# Patient Record
Sex: FEMALE | Race: WHITE | NOT HISPANIC OR LATINO | URBAN - METROPOLITAN AREA
[De-identification: names, ages, dates, MRNs, and addresses within clinical notes are randomized per-mention and may not be internally consistent; named-entity substitution may affect disease eponyms.]

---

## 2017-04-05 ENCOUNTER — INPATIENT HOSPITAL (OUTPATIENT)
Dept: URBAN - METROPOLITAN AREA HOSPITAL 27 | Facility: HOSPITAL | Age: 82
End: 2017-04-05
Payer: MEDICARE

## 2017-04-05 ENCOUNTER — HOSPITAL ENCOUNTER (OUTPATIENT)
Facility: HOSPITAL | Age: 82
Setting detail: OBSERVATION
Discharge: HOME | End: 2017-04-07
Attending: INTERNAL MEDICINE | Admitting: INTERNAL MEDICINE

## 2017-04-05 DIAGNOSIS — K92.2 GASTROINTESTINAL HEMORRHAGE, UNSPECIFIED: ICD-10-CM

## 2017-04-05 LAB
HCT VFR BLD AUTO: 35 % (ref 37–47)
HGB BLD-MCNC: 11.4 G/DL (ref 12–16)
TSH SERPL DL<=0.05 MIU/L-ACNC: 0.75 MIU/ML (ref 0.27–4.2)

## 2017-04-05 PROCEDURE — G0378 HOSPITAL OBSERVATION PER HR: HCPCS

## 2017-04-05 PROCEDURE — G0379 DIRECT REFER HOSPITAL OBSERV: HCPCS

## 2017-04-05 PROCEDURE — 84443 ASSAY THYROID STIM HORMONE: CPT | Performed by: HOSPITALIST

## 2017-04-05 PROCEDURE — 99223 1ST HOSP IP/OBS HIGH 75: CPT | Performed by: INTERNAL MEDICINE

## 2017-04-05 PROCEDURE — 85018 HEMOGLOBIN: CPT | Performed by: HOSPITALIST

## 2017-04-05 PROCEDURE — 85014 HEMATOCRIT: CPT | Performed by: HOSPITALIST

## 2017-04-05 PROCEDURE — 99222 1ST HOSP IP/OBS MODERATE 55: CPT | Performed by: INTERNAL MEDICINE

## 2017-04-05 PROCEDURE — 99222 1ST HOSP IP/OBS MODERATE 55: CPT | Performed by: HOSPITALIST

## 2017-04-05 RX ORDER — PANTOPRAZOLE SODIUM 40 MG/1
40 TABLET, DELAYED RELEASE ORAL DAILY
COMMUNITY
End: 2020-01-21 | Stop reason: HOSPADM

## 2017-04-05 RX ORDER — BENZONATATE 100 MG/1
100 CAPSULE ORAL 3 TIMES DAILY PRN
COMMUNITY
End: 2020-04-30

## 2017-04-05 RX ORDER — POTASSIUM CHLORIDE 750 MG/1
10 TABLET, FILM COATED, EXTENDED RELEASE ORAL DAILY
Status: DISCONTINUED | OUTPATIENT
Start: 2017-04-06 | End: 2017-04-07 | Stop reason: HOSPADM

## 2017-04-05 RX ORDER — LEVOTHYROXINE SODIUM 0.05 MG/1
50 TABLET ORAL DAILY
COMMUNITY

## 2017-04-05 RX ORDER — L.ACID,PARA/B.BIFIDUM/S.THERM 8B CELL
1 CAPSULE ORAL DAILY
Status: DISCONTINUED | OUTPATIENT
Start: 2017-04-05 | End: 2017-04-07 | Stop reason: HOSPADM

## 2017-04-05 RX ORDER — FLUTICASONE PROPIONATE 50 MCG
2 SPRAY, SUSPENSION (ML) NASAL DAILY
Status: DISCONTINUED | OUTPATIENT
Start: 2017-04-06 | End: 2017-04-07 | Stop reason: HOSPADM

## 2017-04-05 RX ORDER — CLOTRIMAZOLE AND BETAMETHASONE DIPROPIONATE 10; .64 MG/G; MG/G
1 CREAM TOPICAL 2 TIMES DAILY PRN
COMMUNITY

## 2017-04-05 RX ORDER — LORAZEPAM 1 MG/1
1 TABLET ORAL 2 TIMES DAILY
Status: DISCONTINUED | OUTPATIENT
Start: 2017-04-06 | End: 2017-04-06

## 2017-04-05 RX ORDER — DOCUSATE SODIUM 100 MG/1
100 CAPSULE, LIQUID FILLED ORAL 2 TIMES DAILY PRN
COMMUNITY
End: 2017-04-07 | Stop reason: HOSPADM

## 2017-04-05 RX ORDER — LEVOTHYROXINE SODIUM 0.05 MG/1
50 TABLET ORAL
Status: DISCONTINUED | OUTPATIENT
Start: 2017-04-06 | End: 2017-04-07 | Stop reason: HOSPADM

## 2017-04-05 RX ORDER — ASPIRIN 81 MG/1
81 TABLET ORAL DAILY
COMMUNITY
End: 2017-04-07 | Stop reason: HOSPADM

## 2017-04-05 RX ORDER — DICYCLOMINE HYDROCHLORIDE 10 MG/1
10 CAPSULE ORAL
COMMUNITY

## 2017-04-05 RX ORDER — LEVOCETIRIZINE DIHYDROCHLORIDE 5 MG/1
5 TABLET, FILM COATED ORAL EVERY EVENING
COMMUNITY

## 2017-04-05 RX ORDER — CLOTRIMAZOLE AND BETAMETHASONE DIPROPIONATE 10; .64 MG/G; MG/G
1 CREAM TOPICAL 2 TIMES DAILY PRN
Status: DISCONTINUED | OUTPATIENT
Start: 2017-04-05 | End: 2017-04-07 | Stop reason: HOSPADM

## 2017-04-05 RX ORDER — CETIRIZINE HYDROCHLORIDE 10 MG/1
5 TABLET ORAL NIGHTLY
Status: DISCONTINUED | OUTPATIENT
Start: 2017-04-05 | End: 2017-04-07 | Stop reason: HOSPADM

## 2017-04-05 RX ORDER — LORAZEPAM 1 MG/1
1 TABLET ORAL 3 TIMES DAILY
Status: ON HOLD | COMMUNITY
End: 2020-03-07 | Stop reason: SDUPTHER

## 2017-04-05 RX ORDER — BENZONATATE 100 MG/1
100 CAPSULE ORAL 3 TIMES DAILY PRN
Status: DISCONTINUED | OUTPATIENT
Start: 2017-04-05 | End: 2017-04-07 | Stop reason: HOSPADM

## 2017-04-05 RX ORDER — GUAIFENESIN 600 MG/1
600 TABLET, EXTENDED RELEASE ORAL EVERY 12 HOURS PRN
Status: DISCONTINUED | OUTPATIENT
Start: 2017-04-05 | End: 2017-04-07 | Stop reason: HOSPADM

## 2017-04-05 RX ORDER — PANTOPRAZOLE SODIUM 40 MG/1
40 TABLET, DELAYED RELEASE ORAL DAILY
Status: DISCONTINUED | OUTPATIENT
Start: 2017-04-06 | End: 2017-04-07 | Stop reason: HOSPADM

## 2017-04-05 RX ORDER — MULTIPLE VITAMINS W/ MINERALS TAB 9MG-400MCG
1 TAB ORAL DAILY
Status: DISCONTINUED | OUTPATIENT
Start: 2017-04-06 | End: 2017-04-07 | Stop reason: HOSPADM

## 2017-04-05 RX ORDER — MECLIZINE HYDROCHLORIDE 25 MG/1
12.5 TABLET ORAL 3 TIMES DAILY PRN
Status: DISCONTINUED | OUTPATIENT
Start: 2017-04-05 | End: 2017-04-07 | Stop reason: HOSPADM

## 2017-04-05 RX ORDER — SULFAMETHOXAZOLE AND TRIMETHOPRIM 200; 40 MG/5ML; MG/5ML
80 SUSPENSION ORAL EVERY 12 HOURS SCHEDULED
Status: DISPENSED | OUTPATIENT
Start: 2017-04-05 | End: 2017-04-07

## 2017-04-05 RX ORDER — FLUTICASONE PROPIONATE 50 MCG
2 SPRAY, SUSPENSION (ML) NASAL DAILY
COMMUNITY

## 2017-04-05 RX ORDER — DICYCLOMINE HYDROCHLORIDE 10 MG/1
10 CAPSULE ORAL
Status: DISCONTINUED | OUTPATIENT
Start: 2017-04-05 | End: 2017-04-07 | Stop reason: HOSPADM

## 2017-04-05 RX ORDER — SULFAMETHOXAZOLE AND TRIMETHOPRIM 400; 80 MG/1; MG/1
1 TABLET ORAL 2 TIMES DAILY
COMMUNITY
End: 2017-04-07 | Stop reason: HOSPADM

## 2017-04-05 RX ORDER — GUAIFENESIN 600 MG/1
600 TABLET, EXTENDED RELEASE ORAL EVERY 12 HOURS PRN
COMMUNITY

## 2017-04-05 RX ORDER — SODIUM CHLORIDE 0.9 % (FLUSH) 0.9 %
1-10 SYRINGE (ML) INJECTION AS NEEDED
Status: DISCONTINUED | OUTPATIENT
Start: 2017-04-05 | End: 2017-04-07 | Stop reason: HOSPADM

## 2017-04-05 RX ORDER — MECLIZINE HCL 12.5 MG/1
12.5 TABLET ORAL 3 TIMES DAILY PRN
COMMUNITY

## 2017-04-05 RX ORDER — DOCUSATE SODIUM 100 MG/1
100 CAPSULE, LIQUID FILLED ORAL 2 TIMES DAILY PRN
Status: DISCONTINUED | OUTPATIENT
Start: 2017-04-05 | End: 2017-04-07 | Stop reason: HOSPADM

## 2017-04-05 RX ORDER — LORAZEPAM 1 MG/1
TABLET ORAL
Status: COMPLETED
Start: 2017-04-05 | End: 2017-04-05

## 2017-04-05 RX ORDER — ACETAMINOPHEN 325 MG/1
650 TABLET ORAL EVERY 4 HOURS PRN
Status: DISCONTINUED | OUTPATIENT
Start: 2017-04-05 | End: 2017-04-07 | Stop reason: HOSPADM

## 2017-04-05 RX ADMIN — ACETAMINOPHEN 650 MG: 325 TABLET, FILM COATED ORAL at 23:38

## 2017-04-05 RX ADMIN — LORAZEPAM 1 MG: 1 TABLET ORAL at 23:37

## 2017-04-05 RX ADMIN — CETIRIZINE HYDROCHLORIDE 5 MG: 10 TABLET, FILM COATED ORAL at 23:37

## 2017-04-06 PROBLEM — K92.2 LOWER GI BLEED: Status: ACTIVE | Noted: 2017-04-06

## 2017-04-06 LAB
ANION GAP SERPL CALCULATED.3IONS-SCNC: 13.2 MMOL/L
BASOPHILS # BLD AUTO: 0.01 10*3/MM3 (ref 0–0.2)
BASOPHILS NFR BLD AUTO: 0.2 % (ref 0–2)
BUN BLD-MCNC: 16 MG/DL (ref 8–23)
BUN/CREAT SERPL: 12.4 (ref 7–25)
C DIFF TOX GENS STL QL NAA+PROBE: POSITIVE
CALCIUM SPEC-SCNC: 8.5 MG/DL (ref 8.8–10.5)
CHLORIDE SERPL-SCNC: 103 MMOL/L (ref 98–107)
CO2 SERPL-SCNC: 22.8 MMOL/L (ref 22–29)
CREAT BLD-MCNC: 1.29 MG/DL (ref 0.57–1)
DEPRECATED RDW RBC AUTO: 44 FL (ref 37–54)
EOSINOPHIL # BLD AUTO: 0.08 10*3/MM3 (ref 0.1–0.3)
EOSINOPHIL NFR BLD AUTO: 1.2 % (ref 0–4)
ERYTHROCYTE [DISTWIDTH] IN BLOOD BY AUTOMATED COUNT: 12.8 % (ref 11.5–14.5)
GFR SERPL CREATININE-BSD FRML MDRD: 39 ML/MIN/1.73
GLUCOSE BLD-MCNC: 85 MG/DL (ref 65–99)
HCT VFR BLD AUTO: 33.9 % (ref 37–47)
HGB BLD-MCNC: 11 G/DL (ref 12–16)
IMM GRANULOCYTES # BLD: 0.01 10*3/MM3 (ref 0–0.03)
IMM GRANULOCYTES NFR BLD: 0.2 % (ref 0–0.5)
LYMPHOCYTES # BLD AUTO: 3.19 10*3/MM3 (ref 0.6–4.8)
LYMPHOCYTES NFR BLD AUTO: 48.3 % (ref 20–45)
MCH RBC QN AUTO: 30.4 PG (ref 27–31)
MCHC RBC AUTO-ENTMCNC: 32.4 G/DL (ref 31–37)
MCV RBC AUTO: 93.6 FL (ref 81–99)
MONOCYTES # BLD AUTO: 0.49 10*3/MM3 (ref 0–1)
MONOCYTES NFR BLD AUTO: 7.4 % (ref 3–8)
NEUTROPHILS # BLD AUTO: 2.82 10*3/MM3 (ref 1.5–8.3)
NEUTROPHILS NFR BLD AUTO: 42.7 % (ref 45–70)
NRBC BLD MANUAL-RTO: 0 /100 WBC (ref 0–0)
PLATELET # BLD AUTO: 214 10*3/MM3 (ref 140–500)
PMV BLD AUTO: 10.9 FL (ref 7.4–10.4)
POTASSIUM BLD-SCNC: 4.4 MMOL/L (ref 3.5–5.2)
RBC # BLD AUTO: 3.62 10*6/MM3 (ref 4.2–5.4)
SODIUM BLD-SCNC: 139 MMOL/L (ref 136–145)
WBC NRBC COR # BLD: 6.6 10*3/MM3 (ref 4.8–10.8)

## 2017-04-06 PROCEDURE — 80048 BASIC METABOLIC PNL TOTAL CA: CPT | Performed by: HOSPITALIST

## 2017-04-06 PROCEDURE — 85025 COMPLETE CBC W/AUTO DIFF WBC: CPT | Performed by: HOSPITALIST

## 2017-04-06 PROCEDURE — G0378 HOSPITAL OBSERVATION PER HR: HCPCS

## 2017-04-06 PROCEDURE — 99225 PR SBSQ OBSERVATION CARE/DAY 25 MINUTES: CPT | Performed by: NURSE PRACTITIONER

## 2017-04-06 PROCEDURE — 96365 THER/PROPH/DIAG IV INF INIT: CPT

## 2017-04-06 PROCEDURE — 99231 SBSQ HOSP IP/OBS SF/LOW 25: CPT | Performed by: INTERNAL MEDICINE

## 2017-04-06 PROCEDURE — 87493 C DIFF AMPLIFIED PROBE: CPT | Performed by: HOSPITALIST

## 2017-04-06 PROCEDURE — 96366 THER/PROPH/DIAG IV INF ADDON: CPT

## 2017-04-06 RX ORDER — LORAZEPAM 1 MG/1
1 TABLET ORAL EVERY 12 HOURS SCHEDULED
Status: DISCONTINUED | OUTPATIENT
Start: 2017-04-06 | End: 2017-04-07 | Stop reason: HOSPADM

## 2017-04-06 RX ADMIN — POTASSIUM CHLORIDE 10 MEQ: 750 TABLET, FILM COATED, EXTENDED RELEASE ORAL at 10:20

## 2017-04-06 RX ADMIN — FLUTICASONE PROPIONATE 2 SPRAY: 50 SPRAY, METERED NASAL at 10:21

## 2017-04-06 RX ADMIN — DILTIAZEM HYDROCHLORIDE 30 MG: 30 TABLET, FILM COATED ORAL at 10:20

## 2017-04-06 RX ADMIN — SULFAMETHOXAZOLE AND TRIMETHOPRIM 80 MG: 200; 40 SUSPENSION ORAL at 21:08

## 2017-04-06 RX ADMIN — METRONIDAZOLE 500 MG: 500 INJECTION, SOLUTION INTRAVENOUS at 15:37

## 2017-04-06 RX ADMIN — SULFAMETHOXAZOLE AND TRIMETHOPRIM 80 MG: 200; 40 SUSPENSION ORAL at 10:20

## 2017-04-06 RX ADMIN — CALCIUM CARBONATE 500 MG (1,250 MG)-VITAMIN D3 200 UNIT TABLET 1 TABLET: at 17:42

## 2017-04-06 RX ADMIN — Medication 1 CAPSULE: at 10:22

## 2017-04-06 RX ADMIN — ACETAMINOPHEN 650 MG: 325 TABLET, FILM COATED ORAL at 21:08

## 2017-04-06 RX ADMIN — CALCIUM CARBONATE 500 MG (1,250 MG)-VITAMIN D3 200 UNIT TABLET 1 TABLET: at 10:31

## 2017-04-06 RX ADMIN — LEVOTHYROXINE SODIUM 50 MCG: 50 TABLET ORAL at 06:42

## 2017-04-06 RX ADMIN — DILTIAZEM HYDROCHLORIDE 30 MG: 30 TABLET, FILM COATED ORAL at 17:42

## 2017-04-06 RX ADMIN — Medication 1 TABLET: at 10:20

## 2017-04-06 RX ADMIN — LORAZEPAM 1 MG: 1 TABLET ORAL at 21:08

## 2017-04-06 RX ADMIN — PANTOPRAZOLE SODIUM 40 MG: 40 TABLET, DELAYED RELEASE ORAL at 10:20

## 2017-04-06 RX ADMIN — METRONIDAZOLE 500 MG: 500 INJECTION, SOLUTION INTRAVENOUS at 21:12

## 2017-04-06 RX ADMIN — CETIRIZINE HYDROCHLORIDE 5 MG: 10 TABLET, FILM COATED ORAL at 21:09

## 2017-04-06 RX ADMIN — LORAZEPAM 1 MG: 1 TABLET ORAL at 10:30

## 2017-04-06 NOTE — PLAN OF CARE
Problem: Patient Care Overview (Adult)  Goal: Plan of Care Review  Outcome: Ongoing (interventions implemented as appropriate)    04/06/17 0358   Coping/Psychosocial Response Interventions   Plan Of Care Reviewed With patient   Patient Care Overview   Progress progress towards functional goals is fair   Outcome Evaluation   Outcome Summary/Follow up Plan New admit, rectal bleeding, positive occult stool, GI consulted, VSS, clear liquid diet, contact spore for cdiff order, labs in the AM       Goal: Adult Individualization and Mutuality  Outcome: Ongoing (interventions implemented as appropriate)  Goal: Discharge Needs Assessment  Outcome: Ongoing (interventions implemented as appropriate)    Problem: Fall Risk (Adult)  Goal: Identify Related Risk Factors and Signs and Symptoms  Outcome: Ongoing (interventions implemented as appropriate)  Goal: Absence of Falls  Outcome: Ongoing (interventions implemented as appropriate)    Problem: Skin Integrity Impairment, Risk/Actual (Adult)  Goal: Identify Related Risk Factors and Signs and Symptoms  Outcome: Ongoing (interventions implemented as appropriate)  Goal: Skin Integrity/Wound Healing  Outcome: Ongoing (interventions implemented as appropriate)    Problem: Pain, Acute (Adult)  Goal: Identify Related Risk Factors and Signs and Symptoms  Outcome: Ongoing (interventions implemented as appropriate)  Goal: Acceptable Pain Control/Comfort Level  Outcome: Ongoing (interventions implemented as appropriate)

## 2017-04-06 NOTE — H&P
[facility] HOSPITALIST     Laci Freedman MD    CHIEF COMPLAINT: Rectal bleeding    HISTORY OF PRESENT ILLNESS:    87 y/o female with hypertension, dyslipidemia, hypothyroidism, GERD, anxiety and depression and h/o hemorrhoids with bleeding and diverticulosis was transferred here from [facility] for GI evaluation of rectal bleeding (they do not have GI coverage this week). The patient notes this bleeding started 3 days ago.  She notes passing bright red blood mostly in the morning the last 3 days. Then loose stools and cramping with her bowel movements throughout the remainder of the day. She denies any N/V and notes her appetite has been normal.  She was started on Bactrim for a sinus and ear infection 3/28/17 and notes she still has some post-nasal gtt and sinus pressure but it has improved with the Abx. The patient denies any fever, cough, SOA.  She notes intermittent dizziness since her sinus problems began which has also improved.  She has had bleeding from her hemorroids in the past but notes this is much worse.  She denies any CP or heart palpitations. She reports a colonoscopy done by Dr. Conley last year that showed diverticulosis and I have reviewed records of a colonoscopy done in 2014 that also showed diverticular disease and hemorrhoids. Patient denies any weakness.       Past Medical History:   Diagnosis Date   • Anxiety    • Hypertension    • Hypothyroid    • Sinus congestion      Past Surgical History:   Procedure Laterality Date   • ABDOMINAL SURGERY     • HYSTERECTOMY     • THYROIDECTOMY       Family History   Problem Relation Age of Onset   • Heart disease Father    • Cancer Sister    • Cancer Brother      Social History   Substance Use Topics   • Smoking status: Never Smoker   • Smokeless tobacco: None   • Alcohol use No     Prescriptions Prior to Admission   Medication Sig Dispense Refill Last Dose   • aspirin 81 MG EC tablet Take 81 mg by mouth Daily.   4/5/2017 at  0900   • benzonatate (TESSALON) 100 MG capsule Take 100 mg by mouth 3 (Three) Times a Day As Needed for Cough.      • Calcium Carbonate-Vitamin D (CALCIUM 500 + D PO) Take 1 tablet by mouth 2 (Two) Times a Day.   4/5/2017 at 0900   • clotrimazole-betamethasone (LOTRISONE) 1-0.05 % cream Apply 1 application topically 2 (Two) Times a Day As Needed (HEMMROIDS).   4/4/2017 at 0900   • dicyclomine (BENTYL) 10 MG capsule Take 10 mg by mouth 4 (Four) Times a Day Before Meals & at Bedtime As Needed (ABDOMINAL PAIN).   4/5/2017 at 0900   • diltiaZEM (CARDIZEM) 30 MG tablet Take 30 mg by mouth 2 (Two) Times a Day.   4/5/2017 at 0900   • docusate sodium (COLACE) 100 MG capsule Take 100 mg by mouth 2 (Two) Times a Day As Needed for Constipation.   4/1/2017 at 0900   • fluticasone (FLONASE) 50 MCG/ACT nasal spray 2 sprays into each nostril Daily.   4/4/2017 at 2100   • guaiFENesin (MUCINEX) 600 MG 12 hr tablet Take 600 mg by mouth Every 12 (Twelve) Hours As Needed for Cough.      • levocetirizine (XYZAL) 5 MG tablet Take 5 mg by mouth Every Evening.   4/4/2017 at 2100   • levothyroxine (SYNTHROID) 50 MCG tablet Take 50 mcg by mouth Daily.   4/5/2017 at 0900   • LORazepam (ATIVAN) 1 MG tablet Take 1 mg by mouth 2 (Two) Times a Day.   4/5/2017 at 0900   • meclizine (ANTIVERT) 12.5 MG tablet Take 12.5 mg by mouth 3 (Three) Times a Day As Needed for dizziness.      • Multiple Vitamins-Minerals (CENTRUM SILVER PO) Take 1 tablet by mouth Daily.   4/4/2017 at 2100   • pantoprazole (PROTONIX) 40 MG EC tablet Take 40 mg by mouth Daily.   4/5/2017 at 0900   • Potassium Chloride (KLOR-CON 10 PO) Take 10 mEq by mouth Daily.   4/5/2017 at 0900   • sulfamethoxazole-trimethoprim (BACTRIM,SEPTRA) 400-80 MG tablet Take 1 tablet by mouth 2 (Two) Times a Day.   4/5/2017 at 0900     Allergies:  Biaxin [clarithromycin]; Ceftin [cefuroxime]; Celexa [citalopram]; Chlorpheniramine; Ciprofloxacin; Crestor [rosuvastatin]; Doxycycline; Effexor  [venlafaxine]; Fluoxetine; Hydrocodone bitartrate er; Imipramine hcl; Levaquin [levofloxacin]; Lexapro [escitalopram oxalate]; Macrobid [nitrofurantoin monohyd macro]; Methscopolamine; Phenylpropanolamine; Prednisone; Promethazine; Simvastatin; Verapamil; Welchol [colesevelam hcl]; Zetia [ezetimibe]; Zoloft [sertraline hcl]; and Penicillins    REVIEW OF SYSTEMS:  Please see the above history of present illness for pertinent positives and negatives.  The remainder of the patient's systems have been reviewed and are negative.     Vital Signs  Temp:  [98.7 °F (37.1 °C)] 98.7 °F (37.1 °C)  Heart Rate:  [78] 78  Resp:  [18] 18  BP: (175)/(85) 175/85  Oxygen Therapy  SpO2: 98 %  There is no height or weight on file to calculate BMI.         Physical Exam:  Physical Exam   Constitutional: Patient appears well-developed and well-nourished and in no acute distress   HEENT:   Head: Normocephalic and atraumatic.   Eyes:  Pupils are equal, round, and reactive to light. EOM are intact. Sclera are anicteric and non-injected.  Mouth and Throat: Patient has moist mucous membranes. Oropharynx is clear of any erythema or exudate.     Neck: Neck supple. No JVD present. No thyromegaly present. No lymphadenopathy present.  Cardiovascular: Regular rate, regular rhythm, S1 normal and S2 normal.  Exam reveals no gallop and no friction rub.  No murmur heard.  Pulmonary/Chest: Lungs are clear to auscultation bilaterally. No respiratory distress. No wheezes. No rhonchi. No rales.   Abdominal: Soft. Bowel sounds are normal. No distension and no mass. There is no hepatosplenomegaly. Patient notes mild diffuse tenderness on exam.   Musculoskeletal: Normal Muscle tone  Extremities: No edema. Pulses are palpable in all 4 extremities.  Neurological: Patient is alert and oriented to person, place, and time. Cranial nerves II-XII are grossly intact with no focal deficits.  Skin: Skin is warm. Nails show no clubbing.  No cyanosis or  erythema.     Results Review:    I reviewed the patient's new clinical results.  Lab Results (most recent)     Labs from [facility] reviewed.          Imaging Results (most recent)    C CT abdomen and pelvis prelim results from [facility] reviewed.            ECG/EMG Results (most recent)     None            Assessment/Plan     1. Rectal bleeding/Lower GI bleed: I have consulted GI and Dr. Ceja has seen the patient.  Suspected diverticular bleeding source.  I wonder if this may have started with diarrhea that could be from the patient's antibiotics.  Will start a probiotic.  Keep patient on clear liquids for now, suspect this will be self limited. Monitor. CT scan at outside hospital showed diverticulosis and nothing acute. Will check c-diff although with NL WNL, NL CT abdomen and afebrile I do believe there is any infectious component to the patient's symptoms.    2. Acute blood loss anemia: Patient's Hb in 10/2016 was 13.8 per [facility] records, now 11.8.  Patient received 1L IVF at [facility] ER so I would expect this to dilute some on next check.  Monitor Hb.     3. Recent sinus and ear infection: Patient on Bactrim due to multiple drug allergies.  Creatinine is up slightly and may be secondary to this, patient notes symptoms improving. Will continue to complete course but monitor renal function closely. Will have pharmacy check dosing for renal function.    4. Mild EDWIN on CKD stage III: Possibly secondary to Bactrim or patient's blood loss.  Just slightly above baseline. Creatinine was 0.98 with GFR in 50s in 10/2016.  Now 1.33 and GFR in 30s.  Patient received 1L IVFs at outside ER. She is eating and drinking normally per her report. No further IVFs tonight as this will further dilute Hb. Monitor renal function closely.     5. Hypertension: BP elevated but patient has not had all her medications today. Resume home diltiazem. Monitor.    5. Dyslipidemia: On no chronic medications for this, recommend  F/U with PCP at discharge.    6. Hypothyroidism: continue home supplementation.    7. GERD: Continue protonix, no acute issues.    8. Anxiety and depression: Continue home dose ativan. No acute issues here.    9. DVT prophylaxis: SCDs, no AC with patient bleeding.      I discussed the patients findings and my recommendations with patient.     Jackelyn Valentin MD  04/05/17  9:50 PM

## 2017-04-06 NOTE — PROGRESS NOTES
"SERVICE: [facility] HOSPITALIST    CONSULTANTS: GI    CHIEF COMPLAINT: f/u LGIB/BRBPR    SUBJECTIVE: The patient reports feeling well, no abdominal pain and only having small amount of blood on toilet tissue with wiping, none in commode/stool. She reports not much appetite but no nausea/vomiting.   Denies f/c/cough/soa/chest pain/abdominal pain or other new concerns.    OBJECTIVE:    /75 (BP Location: Right arm, Patient Position: Sitting)  Pulse 78  Temp 97.9 °F (36.6 °C) (Oral)   Resp 18  Ht 64\" (162.6 cm)  Wt 142 lb (64.4 kg)  SpO2 100%  BMI 24.37 kg/m2    MEDS/LABS REVIEWED AND ORDERED    calcium-vitamin D 1 tablet Oral BID   cetirizine 5 mg Oral Nightly   diltiaZEM 30 mg Oral BID   fluticasone 2 spray Nasal Daily   lactobacillus acidophilus 1 capsule Oral Daily   levothyroxine 50 mcg Oral Q AM   LORazepam 1 mg Oral BID   multivitamin with minerals 1 tablet Oral Daily   pantoprazole 40 mg Oral Daily   potassium chloride 10 mEq Oral Daily   sulfamethoxazole-trimethoprim 80 mg Oral Q12H     Physical Exam   Constitutional: She is oriented to person, place, and time. She appears well-developed and well-nourished.   HENT:   Head: Normocephalic and atraumatic.   Eyes: EOM are normal. Pupils are equal, round, and reactive to light.   Cardiovascular: Normal rate, regular rhythm and normal heart sounds.  Exam reveals no gallop and no friction rub.    No murmur heard.  Pulmonary/Chest: Effort normal and breath sounds normal. No respiratory distress. She has no wheezes. She has no rales.   Abdominal: Soft. She exhibits no distension. There is no tenderness. There is no rebound and no guarding.   Hyperactive bowel sounds   Musculoskeletal: She exhibits no edema.   Neurological: She is alert and oriented to person, place, and time.   Skin: Skin is warm and dry. No erythema.   Psychiatric: She has a normal mood and affect. Her behavior is normal. Judgment and thought content normal.   Vitals " reviewed.    LAB/DIAGNOSTICS:    Lab Results (last 24 hours)     Procedure Component Value Units Date/Time    Hemoglobin & Hematocrit, Blood [[id number]]  (Abnormal) Collected:  04/05/17 2258    Specimen:  Blood Updated:  04/05/17 2302     Hemoglobin 11.4 (L) g/dL      Hematocrit 35.0 (L) %     TSH [[id number]]  (Normal) Collected:  04/05/17 2258    Specimen:  Blood Updated:  04/05/17 2352     TSH 0.750 mIU/mL     CBC Auto Differential [[id number]]  (Abnormal) Collected:  04/06/17 0330    Specimen:  Blood Updated:  04/06/17 0416     WBC 6.60 10*3/mm3      RBC 3.62 (L) 10*6/mm3      Hemoglobin 11.0 (L) g/dL      Hematocrit 33.9 (L) %      MCV 93.6 fL      MCH 30.4 pg      MCHC 32.4 g/dL      RDW 12.8 %      RDW-SD 44.0 fl      MPV 10.9 (H) fL      Platelets 214 10*3/mm3      Neutrophil % 42.7 (L) %      Lymphocyte % 48.3 (H) %      Monocyte % 7.4 %      Eosinophil % 1.2 %      Basophil % 0.2 %      Immature Grans % 0.2 %      Neutrophils, Absolute 2.82 10*3/mm3      Lymphocytes, Absolute 3.19 10*3/mm3      Monocytes, Absolute 0.49 10*3/mm3      Eosinophils, Absolute 0.08 (L) 10*3/mm3      Basophils, Absolute 0.01 10*3/mm3      Immature Grans, Absolute 0.01 10*3/mm3      nRBC 0.0 /100 WBC     Basic Metabolic Panel [[id number]]  (Abnormal) Collected:  04/06/17 0330    Specimen:  Blood Updated:  04/06/17 0434     Glucose 85 mg/dL      BUN 16 mg/dL      Creatinine 1.29 (H) mg/dL      Sodium 139 mmol/L      Potassium 4.4 mmol/L      Chloride 103 mmol/L      CO2 22.8 mmol/L      Calcium 8.5 (L) mg/dL      eGFR Non African Amer 39 (L) mL/min/1.73      BUN/Creatinine Ratio 12.4     Anion Gap 13.2 mmol/L     Narrative:       The MDRD GFR formula is only valid for adults with stable renal function between ages 18 and 70.    Clostridium Difficile Toxin, PCR [[id number]] Collected:  04/06/17 0803    Specimen:  Stool from Per Rectum Updated:  04/06/17 0813        ASSESSMENT/PLAN:  1. LGIB/rectal bleeding: GI following  Diverticular  bleeding suspected  Probiotic started  Advance to full liquids  C-dif pending  Continue dicyclomine/probiotic/PPI    2. Acute blood loss anemia:  Hgb 11.0 today, stable   Baseline 10/2016 13.8   Received IV hydration at previous facility  Monitor closely      3. Recent sinus and ear infection:   On Bactrim secondary to drug allergies-pharmacy consulted yesterday to dose  Lymphs high, neutrophils low    4. Acute kidney injury on CKD 3:   Creatinine seems at baseline with past 1.32 in 6/2016 and 0.98, currently 1.29   Monitor     5. Hypertension: at goal for age     5. Dyslipidemia: no meds, no acute issues  F/U Laci Freedman MD     6. Hypothyroidism: no acute issues on home levothyroxine 50 mcg     7. GERD: no acute issues on protonix 40 mg daily     8. Anxiety and depression: no acute issues on lorazepam 1 mg bid     9. DVT prophylaxis: PPI/SCDs

## 2017-04-06 NOTE — NURSING NOTE
Discharge Planning Assessment  PAULO Pillai     Patient Name: Paulo Hampton  MRN: [id number]  Today's Date: 4/6/2017    Admit Date: 4/5/2017          Discharge Needs Assessment       04/06/17 0910    Living Environment    Lives With alone    Living Arrangements house    Provides Primary Care For no one    Quality Of Family Relationships supportive;helpful;involved    Able to Return to Prior Living Arrangements yes    Living Arrangement Comments patient lives home alone.    Discharge Needs Assessment    Concerns To Be Addressed denies needs/concerns at this time    Readmission Within The Last 30 Days no previous admission in last 30 days    Anticipated Changes Related to Illness none    Equipment Needed After Discharge cane, straight;walker, rolling    Transportation Available family or friend will provide    Discharge Planning Comments spoke with patient at bedside. she lives home alone. she has a daughter, grandson, son and daughter in law involved in her care. she has not used HH or home O2 in the past. she uses a rolling walker and cane. independent with ADL's. able to drive but her battery on the van is down. her son and daughter in law take her to MD appts and to the grocery. she has a [employer] nurse that calls once a month. she uses [facility] [place] and denies having difficulty paying for medications. has a living will and copy made to scan into chart. plan is home when medically stable. no needs identified. will continue to follow.               Discharge Plan       04/06/17 0914    Case Management/Social Work Plan    Plan plan is home when medically stable.     Patient/Family In Agreement With Plan yes    Additional Comments spoke with patient at bedside. she lives home alone. she has a daughter, grandson, son and daughter in law involved in her care. she has not used HH or home O2 in the past. she uses a rolling walker and cane. independent with ADL's. able to drive but her battery on the van is down. her son  and daughter in law take her to MD richard and to the grocery. she has a [employer] nurse that calls once a month. she uses [facility] [place] and denies having difficulty paying for medications. has a living will and copy made to scan into chart. plan is home when medically stable. no needs identified. will continue to follow.          Discharge Placement     No information found                Demographic Summary       04/06/17 0948    Referral Information    Referral Source admission list    Reason For Consult discharge planning    Record Reviewed medical record    Contact Information    Permission Granted to Share Information With             Functional Status     None            Psychosocial     None            Abuse/Neglect     None            Legal     None            Substance Abuse     None            Patient Forms     None          Bertha Meyer, BRANDY

## 2017-04-06 NOTE — CONSULTS
Consults    Patient Care Team:  Laci Freedman MD as PCP - General (Family Medicine)    Chief complaint: bleeding    Subjective     History of Present Illness  Pleasant elderly lady with rectal bleeding.  SHe has a history of diverticulosis.  The bleeding did happen and became profuse,  No pain with it.  She went to [facility] and was told they did not have gastroenterology or surgery coverage and requested transfer here.  She has good appetite. Dr Conley had done a colonoscopy last year and she was told she had diverticulosis.  That was done for a similar presentation .  Review of Systems   Constitutional: Positive for fatigue.   Eyes: Negative.    Respiratory: Negative.    Cardiovascular: Negative.    Gastrointestinal: Positive for anal bleeding, blood in stool and rectal pain.   Endocrine: Negative.    Genitourinary: Negative.    Skin: Negative.    Allergic/Immunologic: Negative.    Neurological: Negative.    Hematological: Negative.    Psychiatric/Behavioral: Negative.    All other systems reviewed and are negative.       No past medical history on file., No past surgical history on file., No family history on file.,   Social History   Substance Use Topics   • Smoking status: Not on file   • Smokeless tobacco: Not on file   • Alcohol use Not on file   ,   Prescriptions Prior to Admission   Medication Sig Dispense Refill Last Dose   • aspirin 81 MG EC tablet Take 81 mg by mouth Daily.   4/5/2017 at 0900   • benzonatate (TESSALON) 100 MG capsule Take 100 mg by mouth 3 (Three) Times a Day As Needed for Cough.      • Calcium Carbonate-Vitamin D (CALCIUM 500 + D PO) Take 1 tablet by mouth 2 (Two) Times a Day.   4/5/2017 at 0900   • clotrimazole-betamethasone (LOTRISONE) 1-0.05 % cream Apply 1 application topically 2 (Two) Times a Day As Needed (HEMMROIDS).   4/4/2017 at 0900   • dicyclomine (BENTYL) 10 MG capsule Take 10 mg by mouth 4 (Four) Times a Day Before Meals & at Bedtime As Needed (ABDOMINAL PAIN).    4/5/2017 at 0900   • diltiaZEM (CARDIZEM) 30 MG tablet Take 30 mg by mouth 2 (Two) Times a Day.   4/5/2017 at 0900   • docusate sodium (COLACE) 100 MG capsule Take 100 mg by mouth 2 (Two) Times a Day As Needed for Constipation.   4/1/2017 at 0900   • fluticasone (FLONASE) 50 MCG/ACT nasal spray 2 sprays into each nostril Daily.   4/4/2017 at 2100   • guaiFENesin (MUCINEX) 600 MG 12 hr tablet Take 600 mg by mouth Every 12 (Twelve) Hours As Needed for Cough.      • levocetirizine (XYZAL) 5 MG tablet Take 5 mg by mouth Every Evening.   4/4/2017 at 2100   • levothyroxine (SYNTHROID) 50 MCG tablet Take 50 mcg by mouth Daily.   4/5/2017 at 0900   • LORazepam (ATIVAN) 1 MG tablet Take 1 mg by mouth 2 (Two) Times a Day.   4/5/2017 at 0900   • meclizine (ANTIVERT) 12.5 MG tablet Take 12.5 mg by mouth 3 (Three) Times a Day As Needed for dizziness.      • Multiple Vitamins-Minerals (CENTRUM SILVER PO) Take 1 tablet by mouth Daily.   4/4/2017 at 2100   • pantoprazole (PROTONIX) 40 MG EC tablet Take 40 mg by mouth Daily.   4/5/2017 at 0900   • Potassium Chloride (KLOR-CON 10 PO) Take 10 mEq by mouth Daily.   4/5/2017 at 0900   • sulfamethoxazole-trimethoprim (BACTRIM,SEPTRA) 400-80 MG tablet Take 1 tablet by mouth 2 (Two) Times a Day.   4/5/2017 at 0900   , Scheduled Meds:  , Continuous Infusions:    No current facility-administered medications for this encounter. , PRN Meds:   and Allergies:  Biaxin [clarithromycin]; Ceftin [cefuroxime]; Celexa [citalopram]; Chlorpheniramine; Ciprofloxacin; Crestor [rosuvastatin]; Doxycycline; Effexor [venlafaxine]; Fluoxetine; Guaifenesin & derivatives; Hydrocodone bitartrate er; Imipramine hcl; Levaquin [levofloxacin]; Lexapro [escitalopram oxalate]; Macrobid [nitrofurantoin monohyd macro]; Methscopolamine; Phenylpropanolamine; Prednisone; Promethazine; Simvastatin; Sulfa antibiotics; Verapamil; Welchol [colesevelam hcl]; Zetia [ezetimibe]; Zoloft [sertraline hcl]; and  Penicillins    Objective      Vital Signs  Temp:  [98.7 °F (37.1 °C)] 98.7 °F (37.1 °C)  Heart Rate:  [78] 78  Resp:  [18] 18  BP: (175)/(85) 175/85    Physical Exam   Constitutional: She is oriented to person, place, and time. She appears well-developed.   HENT:   Mouth/Throat: Oropharynx is clear and moist.   Eyes: Conjunctivae are normal.   Neck: Neck supple.   Cardiovascular: Normal rate and regular rhythm.    Pulmonary/Chest: Effort normal and breath sounds normal.   Abdominal: Soft. Bowel sounds are normal.   Neurological: She is alert and oriented to person, place, and time.   Skin: Skin is warm and dry.   Psychiatric: She has a normal mood and affect.       Results Review:    I reviewed the patient's new clinical results.  I reviewed the patient's new imaging results and agree with the interpretation.        Assessment/Plan     Active Problems:    * No active hospital problems. *      Assessment:  (Lower gastrointestinal bleed  Likely diverticular in origin  Doubt colonic ischemia).     Plan:   (Expectant rx  Most of the time these bleeding episodes stop on their own  If bleeding remains protracted  Could consider colonoscopy but given behaviour of diverticular bleeds, typically colonoscopy not helpful as the bleeding normally has stopped by the time patient is prepped and had the scope done.   She seems stable at present.  Doubt surgery will be required thanks ).       I discussed the patients findings and my recommendations with patient, family, nursing staff and primary care team    Bharathi Ceja MD  04/05/17  8:54 PM    Time: Critical care 25 min

## 2017-04-06 NOTE — PLAN OF CARE
Problem: Patient Care Overview (Adult)  Goal: Discharge Needs Assessment  Outcome: Ongoing (interventions implemented as appropriate)    04/05/17 2145 04/06/17 3818   Discharge Needs Assessment   Concerns To Be Addressed --  denies needs/concerns at this time   Readmission Within The Last 30 Days --  no previous admission in last 30 days   Equipment Needed After Discharge --  cane, straight;walker, rolling   Discharge Planning Comments --  spoke with patient at bedside. she lives home alone. she has a daughter, grandson, son and daughter in law involved in her care. she has not used HH or home O2 in the past. she uses a rolling walker and cane. independent with ADL's. able to drive but her battery on the van is down. her son and daughter in law take her to MD appts and to the grocery. she has a [employer] nurse that calls once a month. she uses [facility] and denies having difficulty paying for medications. has a living will and copy made to scan into chart. plan is home when medically stable. no needs identified. will continue to follow.    Current Health   Anticipated Changes Related to Illness --  none   Living Environment   Transportation Available --  family or friend will provide   Self-Care   Equipment Currently Used at Home cane, quad;walker, rolling --

## 2017-04-06 NOTE — PROGRESS NOTES
"   LOS: 1 day   Patient Care Team:  Laci Freedman MD as PCP - General (Family Medicine)    Chief Complaint: BLEEDING    Subjective     History of Present Illness    Subjective:  Symptoms:  Improved.  She reports anxiety.    Diet:  Adequate intake.    Activity level: Impaired due to weakness.    Pain:  She reports no pain.      NO FURTHER BLEEDIng  SHE DENIES DIARRHEA  Feels well wants to go home   History taken from: patient chart    Objective     Vital Signs  Temp:  [97.3 °F (36.3 °C)-98.7 °F (37.1 °C)] 98.5 °F (36.9 °C)  Heart Rate:  [67-78] 74  Resp:  [18-20] 20  BP: (117-180)/(56-85) 117/56    Objective:  General Appearance:  Comfortable.    Vital signs: (most recent): Blood pressure 117/56, pulse 74, temperature 98.5 °F (36.9 °C), temperature source Oral, resp. rate 20, height 64\" (162.6 cm), weight 142 lb (64.4 kg), SpO2 94 %.  Vital signs are normal.    Lungs:  Normal effort.    Heart: Normal rate.    Abdomen: Abdomen is soft.    Neurological: Patient is alert and oriented to person, place and time.              Results Review:     I reviewed the patient's new clinical results.    Medication Review:     Assessment/Plan     Active Problems:    Acute lower GI bleeding    Lower GI bleed      Assessment:  (Lower gastrointestinal bleed improved likely etiology is diverticular bleeding  Clostridia toxin positive stool ).     Plan:   (Patient is stable and can be discharged from GI viewpoint  Certainly reasonable to treat the clostridia difficile + toxin,  Feel she is clinically stable at the present time.  Follow up with GI as needed. ).       Bharathi Ceja MD  04/06/17  7:43 PM    Time: Critical care 15 min      "

## 2017-04-07 VITALS
RESPIRATION RATE: 20 BRPM | SYSTOLIC BLOOD PRESSURE: 117 MMHG | DIASTOLIC BLOOD PRESSURE: 69 MMHG | TEMPERATURE: 98.7 F | BODY MASS INDEX: 24.24 KG/M2 | OXYGEN SATURATION: 98 % | HEIGHT: 64 IN | HEART RATE: 92 BPM | WEIGHT: 142 LBS

## 2017-04-07 LAB
ANION GAP SERPL CALCULATED.3IONS-SCNC: 13.3 MMOL/L
BUN BLD-MCNC: 10 MG/DL (ref 8–23)
BUN/CREAT SERPL: 9.3 (ref 7–25)
CALCIUM SPEC-SCNC: 8.6 MG/DL (ref 8.8–10.5)
CHLORIDE SERPL-SCNC: 100 MMOL/L (ref 98–107)
CO2 SERPL-SCNC: 22.7 MMOL/L (ref 22–29)
CREAT BLD-MCNC: 1.08 MG/DL (ref 0.57–1)
DEPRECATED RDW RBC AUTO: 43.2 FL (ref 37–54)
EOSINOPHIL # BLD MANUAL: 0.11 10*3/MM3 (ref 0.1–0.3)
EOSINOPHIL NFR BLD MANUAL: 2 % (ref 0–4)
ERYTHROCYTE [DISTWIDTH] IN BLOOD BY AUTOMATED COUNT: 12.7 % (ref 11.5–14.5)
GFR SERPL CREATININE-BSD FRML MDRD: 48 ML/MIN/1.73
GLUCOSE BLD-MCNC: 91 MG/DL (ref 65–99)
HCT VFR BLD AUTO: 34.9 % (ref 37–47)
HGB BLD-MCNC: 11.3 G/DL (ref 12–16)
LYMPHOCYTES # BLD MANUAL: 3.41 10*3/MM3 (ref 0.6–4.8)
LYMPHOCYTES NFR BLD MANUAL: 6 % (ref 3–8)
LYMPHOCYTES NFR BLD MANUAL: 60 % (ref 20–45)
MCH RBC QN AUTO: 30.1 PG (ref 27–31)
MCHC RBC AUTO-ENTMCNC: 32.4 G/DL (ref 31–37)
MCV RBC AUTO: 93.1 FL (ref 81–99)
MONOCYTES # BLD AUTO: 0.34 10*3/MM3 (ref 0–1)
NEUTROPHILS # BLD AUTO: 1.82 10*3/MM3 (ref 1.5–8.3)
NEUTROPHILS NFR BLD MANUAL: 32 % (ref 45–70)
PLATELET # BLD AUTO: 219 10*3/MM3 (ref 140–500)
PMV BLD AUTO: 10.9 FL (ref 7.4–10.4)
POTASSIUM BLD-SCNC: 4.3 MMOL/L (ref 3.5–5.2)
RBC # BLD AUTO: 3.75 10*6/MM3 (ref 4.2–5.4)
RBC MORPH BLD: NORMAL
SMALL PLATELETS BLD QL SMEAR: ADEQUATE
SODIUM BLD-SCNC: 136 MMOL/L (ref 136–145)
WBC MORPH BLD: NORMAL
WBC NRBC COR # BLD: 5.69 10*3/MM3 (ref 4.8–10.8)

## 2017-04-07 PROCEDURE — 80048 BASIC METABOLIC PNL TOTAL CA: CPT | Performed by: NURSE PRACTITIONER

## 2017-04-07 PROCEDURE — G0378 HOSPITAL OBSERVATION PER HR: HCPCS

## 2017-04-07 PROCEDURE — 99217 PR OBSERVATION CARE DISCHARGE MANAGEMENT: CPT | Performed by: NURSE PRACTITIONER

## 2017-04-07 PROCEDURE — 85025 COMPLETE CBC W/AUTO DIFF WBC: CPT | Performed by: NURSE PRACTITIONER

## 2017-04-07 PROCEDURE — 85060 BLOOD SMEAR INTERPRETATION: CPT | Performed by: NURSE PRACTITIONER

## 2017-04-07 PROCEDURE — 85007 BL SMEAR W/DIFF WBC COUNT: CPT | Performed by: NURSE PRACTITIONER

## 2017-04-07 RX ORDER — METRONIDAZOLE 500 MG/1
500 TABLET ORAL EVERY 8 HOURS SCHEDULED
Qty: 30 TABLET | Refills: 0 | Status: SHIPPED | OUTPATIENT
Start: 2017-04-07 | End: 2017-04-18

## 2017-04-07 RX ORDER — METRONIDAZOLE 500 MG/1
500 TABLET ORAL EVERY 8 HOURS SCHEDULED
Status: DISCONTINUED | OUTPATIENT
Start: 2017-04-07 | End: 2017-04-07 | Stop reason: HOSPADM

## 2017-04-07 RX ORDER — L.ACID,PARA/B.BIFIDUM/S.THERM 8B CELL
1 CAPSULE ORAL DAILY
Qty: 30 CAPSULE | Refills: 0 | Status: SHIPPED | OUTPATIENT
Start: 2017-04-07 | End: 2019-12-13

## 2017-04-07 RX ADMIN — CALCIUM CARBONATE 500 MG (1,250 MG)-VITAMIN D3 200 UNIT TABLET 1 TABLET: at 08:46

## 2017-04-07 RX ADMIN — Medication 1 CAPSULE: at 08:45

## 2017-04-07 RX ADMIN — METRONIDAZOLE 500 MG: 500 INJECTION, SOLUTION INTRAVENOUS at 07:09

## 2017-04-07 RX ADMIN — Medication 1 TABLET: at 08:45

## 2017-04-07 RX ADMIN — PANTOPRAZOLE SODIUM 40 MG: 40 TABLET, DELAYED RELEASE ORAL at 07:09

## 2017-04-07 RX ADMIN — POTASSIUM CHLORIDE 10 MEQ: 750 TABLET, FILM COATED, EXTENDED RELEASE ORAL at 08:50

## 2017-04-07 RX ADMIN — FLUTICASONE PROPIONATE 2 SPRAY: 50 SPRAY, METERED NASAL at 08:45

## 2017-04-07 RX ADMIN — DILTIAZEM HYDROCHLORIDE 30 MG: 30 TABLET, FILM COATED ORAL at 08:46

## 2017-04-07 RX ADMIN — METRONIDAZOLE 500 MG: 500 TABLET ORAL at 11:52

## 2017-04-07 RX ADMIN — LEVOTHYROXINE SODIUM 50 MCG: 50 TABLET ORAL at 08:45

## 2017-04-07 RX ADMIN — LORAZEPAM 1 MG: 1 TABLET ORAL at 08:58

## 2017-04-07 NOTE — DISCHARGE SUMMARY
Paulo Hampton  5/27/1930  [id number]    Hospitalists Discharge Summary    Date of Admission: 4/5/2017  Date of Discharge:  4/7/2017    Primary Discharge Diagnoses:   1. LGIB with rectal bleeding  2. C-Diff colitis  Secondary Discharge Diagnoses:  3. Acute blood loss anemia  4. Hypertension  5. Acute kidney injury on CKD 3  6. Hypothyroidism  7. GERD   8. Recent sinusitis/otitis  9. Dyslipidemia   10. Anxiety/Depression  PCP  Patient Care Team:  Laci Freedman MD as PCP - General (Family Medicine)    Consults:   Consults     Date and Time Order Name Status Description    4/5/2017 2218 Inpatient Consult to Gastroenterology          Operations and Procedures Performed:     No results found.    Allergies:  is allergic to biaxin [clarithromycin]; ceftin [cefuroxime]; celexa [citalopram]; chlorpheniramine; ciprofloxacin; crestor [rosuvastatin]; doxycycline; effexor [venlafaxine]; fluoxetine; hydrocodone bitartrate er; imipramine hcl; levaquin [levofloxacin]; lexapro [escitalopram oxalate]; macrobid [nitrofurantoin monohyd macro]; methscopolamine; phenylpropanolamine; prednisone; promethazine; simvastatin; verapamil; welchol [colesevelam hcl]; zetia [ezetimibe]; zoloft [sertraline hcl]; and penicillins.    Quan  Lorazepam 3/2017 per report of 4/5/17    Discharge Medications:   Paulo Hampton   Home Medication Instructions VERO:[id number]    Printed on:04/07/17 0934   Medication Information                      benzonatate (TESSALON) 100 MG capsule  Take 100 mg by mouth 3 (Three) Times a Day As Needed for Cough.             Calcium Carbonate-Vitamin D (CALCIUM 500 + D PO)  Take 1 tablet by mouth 2 (Two) Times a Day.             clotrimazole-betamethasone (LOTRISONE) 1-0.05 % cream  Apply 1 application topically 2 (Two) Times a Day As Needed (HEMMROIDS).             dicyclomine (BENTYL) 10 MG capsule  Take 10 mg by mouth 4 (Four) Times a Day Before Meals & at Bedtime As Needed (ABDOMINAL PAIN).             diltiaZEM  "(CARDIZEM) 30 MG tablet  Take 30 mg by mouth 2 (Two) Times a Day.             fluticasone (FLONASE) 50 MCG/ACT nasal spray  2 sprays into each nostril Daily.             guaiFENesin (MUCINEX) 600 MG 12 hr tablet  Take 600 mg by mouth Every 12 (Twelve) Hours As Needed for Cough.             lactobacillus acidophilus (RISAQUAD) capsule capsule  Take 1 capsule by mouth Daily.             levocetirizine (XYZAL) 5 MG tablet  Take 5 mg by mouth Every Evening.             levothyroxine (SYNTHROID) 50 MCG tablet  Take 50 mcg by mouth Daily.             LORazepam (ATIVAN) 1 MG tablet  Take 1 mg by mouth 2 (Two) Times a Day.             meclizine (ANTIVERT) 12.5 MG tablet  Take 12.5 mg by mouth 3 (Three) Times a Day As Needed for dizziness.             metroNIDAZOLE (FLAGYL) 500 MG tablet  Take 1 tablet by mouth Every 8 (Eight) Hours for 11 days. Indications: Clostridium Difficile Infection             Multiple Vitamins-Minerals (CENTRUM SILVER PO)  Take 1 tablet by mouth Daily.             pantoprazole (PROTONIX) 40 MG EC tablet  Take 40 mg by mouth Daily.             Potassium Chloride (KLOR-CON 10 PO)  Take 10 mEq by mouth Daily.               History of Present Illness: Taken from original HPI on admit per Dr. Grant:  \"87 y/o female with hypertension, dyslipidemia, hypothyroidism, GERD, anxiety and depression and h/o hemorrhoids with bleeding and diverticulosis was transferred here from [facility] for GI evaluation of rectal bleeding (they do not have GI coverage this week). The patient notes this bleeding started 3 days ago. She notes passing bright red blood mostly in the morning the last 3 days. Then loose stools and cramping with her bowel movements throughout the remainder of the day. She denies any N/V and notes her appetite has been normal. She was started on Bactrim for a sinus and ear infection 3/28/17 and notes she still has some post-nasal gtt and sinus pressure but it has improved with the Abx. " "The patient denies any fever, cough, SOA. She notes intermittent dizziness since her sinus problems began which has also improved. She has had bleeding from her hemorroids in the past but notes this is much worse. She denies any CP or heart palpitations. She reports a colonoscopy done by Dr. Conley last year that showed diverticulosis and I have reviewed records of a colonoscopy done in 2014 that also showed diverticular disease and hemorrhoids. Patient denies any weakness.\"    Hospital Course  1. LGIB with rectal bleeding secondary to  2. C-Diff colitis: GI followed throughout  Started on Flagyl IV last night, changed to oral flagyl 500 mg three times daily x 10 days, this am  Continued on home dicyclomine and PPI  Advance to regular diet this am  HOLD daily ASA 81 mg pending re-eval per Dr. Freedman  Added daily probiotic while inpatient, continue x 30 days  F/U Dr. Conley in 2 weeks  F/U Laci Freedman MD 1 week    3. Acute blood loss anemia:  Hgb stable at 11.3  F/U Laci Freedman MD1 week  No active signs of blood loss currently    4. Hypertension: at goal for age without medication other than dilitiazem 30 mg bid  F/U Laci Freedman MD to trend and alter as needed.    5. Acute kidney injury on CKD 3:   Creatinine 1.08 this morning, no acute issues  F/U Laci Freedman MD 1 week    6. Hypothyroidism: TSH normal on home med, no acute issues on levothyroxine 50 mcg  F/U Laci Freedman MD 1 week    7. GERD: no acute issues on protonix 40 mg daily      8. Recent sinusitis/otitis:   Continued on Bactrim per home meds, now complete  F/U Laci Freedman MD     9. Dyslipidemia: no medication or dietary restrictions, nothing acute here  F/U Laci Freedman MD     10. Anxiety/Depression: no acute issues on lorazepam 1 mg bid per home med regimen      Last Lab Results:   Lab Results (most recent)     Procedure Component Value Units Date/Time    Hemoglobin & Hematocrit, Blood [[id number]]  (Abnormal) Collected:  04/05/17 " 2258    Specimen:  Blood Updated:  04/05/17 2302     Hemoglobin 11.4 (L) g/dL      Hematocrit 35.0 (L) %     TSH [[id number]]  (Normal) Collected:  04/05/17 2258    Specimen:  Blood Updated:  04/05/17 2352     TSH 0.750 mIU/mL     CBC Auto Differential [[id number]]  (Abnormal) Collected:  04/06/17 0330    Specimen:  Blood Updated:  04/06/17 0416     WBC 6.60 10*3/mm3      RBC 3.62 (L) 10*6/mm3      Hemoglobin 11.0 (L) g/dL      Hematocrit 33.9 (L) %      MCV 93.6 fL      MCH 30.4 pg      MCHC 32.4 g/dL      RDW 12.8 %      RDW-SD 44.0 fl      MPV 10.9 (H) fL      Platelets 214 10*3/mm3      Neutrophil % 42.7 (L) %      Lymphocyte % 48.3 (H) %      Monocyte % 7.4 %      Eosinophil % 1.2 %      Basophil % 0.2 %      Immature Grans % 0.2 %      Neutrophils, Absolute 2.82 10*3/mm3      Lymphocytes, Absolute 3.19 10*3/mm3      Monocytes, Absolute 0.49 10*3/mm3      Eosinophils, Absolute 0.08 (L) 10*3/mm3      Basophils, Absolute 0.01 10*3/mm3      Immature Grans, Absolute 0.01 10*3/mm3      nRBC 0.0 /100 WBC     Basic Metabolic Panel [[id number]]  (Abnormal) Collected:  04/06/17 0330    Specimen:  Blood Updated:  04/06/17 0434     Glucose 85 mg/dL      BUN 16 mg/dL      Creatinine 1.29 (H) mg/dL      Sodium 139 mmol/L      Potassium 4.4 mmol/L      Chloride 103 mmol/L      CO2 22.8 mmol/L      Calcium 8.5 (L) mg/dL      eGFR Non African Amer 39 (L) mL/min/1.73      BUN/Creatinine Ratio 12.4     Anion Gap 13.2 mmol/L     Narrative:       The MDRD GFR formula is only valid for adults with stable renal function between ages 18 and 70.    Clostridium Difficile Toxin, PCR [[id number]]  (Abnormal) Collected:  04/06/17 0803    Specimen:  Stool from Per Rectum Updated:  04/06/17 1313     C. Difficile Toxins by PCR Positive (C)    Basic Metabolic Panel [[id number]]  (Abnormal) Collected:  04/07/17 0339    Specimen:  Blood Updated:  04/07/17 0445     Glucose 91 mg/dL      BUN 10 mg/dL      Creatinine 1.08 (H) mg/dL      Sodium 136 mmol/L       Potassium 4.3 mmol/L      Chloride 100 mmol/L      CO2 22.7 mmol/L      Calcium 8.6 (L) mg/dL      eGFR Non African Amer 48 (L) mL/min/1.73      BUN/Creatinine Ratio 9.3     Anion Gap 13.3 mmol/L     Narrative:       The MDRD GFR formula is only valid for adults with stable renal function between ages 18 and 70.    Slide Review, Hematology [[id number]] Collected:  04/07/17 0339    Specimen:  Blood Updated:  04/07/17 0618    CBC Auto Differential [[id number]]  (Abnormal) Collected:  04/07/17 0339    Specimen:  Blood Updated:  04/07/17 0619     WBC 5.69 10*3/mm3      RBC 3.75 (L) 10*6/mm3      Hemoglobin 11.3 (L) g/dL      Hematocrit 34.9 (L) %      MCV 93.1 fL      MCH 30.1 pg      MCHC 32.4 g/dL      RDW 12.7 %      RDW-SD 43.2 fl      MPV 10.9 (H) fL      Platelets 219 10*3/mm3     Manual Differential [[id number]]  (Abnormal) Collected:  04/07/17 0339    Specimen:  Blood Updated:  04/07/17 0619     Neutrophil % 32.0 (L) %      Lymphocyte % 60.0 (H) %      Monocyte % 6.0 %      Eosinophil % 2.0 %      Neutrophils Absolute 1.82 10*3/mm3      Lymphocytes Absolute 3.41 10*3/mm3      Monocytes Absolute 0.34 10*3/mm3      Eosinophils Absolute 0.11 10*3/mm3      RBC Morphology Normal     WBC Morphology Normal     Platelet Estimate Adequate    CBC & Differential [[id number]] Collected:  04/07/17 0339    Specimen:  Blood Updated:  04/07/17 0626    Narrative:       The following orders were created for panel order CBC & Differential.  Procedure                               Abnormality         Status                     ---------                               -----------         ------                     Manual Differential[[id number]]           Abnormal            Final result               Scan Slide[[id number]]                                                                   CBC Auto Differential[[id number]]         Abnormal            Final result               Slide Review, Hematology[[id number]]                          In  process                   Please view results for these tests on the individual orders.        Imaging Results (most recent)     None        PROCEDURES: NONE    Condition on Discharge:  Stable    Physical Exam at Discharge  Vital Signs  Temp:  [97.3 °F (36.3 °C)-98.5 °F (36.9 °C)] 97.7 °F (36.5 °C)  Heart Rate:  [61-78] 71  Resp:  [18-20] 20  BP: (117-183)/(56-73) 153/65    Physical Exam:  Physical Exam   Constitutional: Patient appears well-developed and well-nourished and in no acute distress   HEENT:   Head: Normocephalic and atraumatic.   Eyes:  Pupils are equal, round, and reactive to light. EOM are intact. Sclera are anicteric and non-injected.  Mouth and Throat: Patient has moist mucous membranes. Oropharynx is clear of any erythema or exudate.     Neck: Neck supple. No JVD present. No thyromegaly present. No lymphadenopathy present.  Cardiovascular: Regular rate, regular rhythm, S1 normal and S2 normal.  Exam reveals no gallop and no friction rub.  No murmur heard.  Pulmonary/Chest: Lungs are clear to auscultation bilaterally. No respiratory distress. No wheezes. No rhonchi. No rales.   Abdominal: Soft. Bowel sounds are hyperactive. No distension and no mass. There is no hepatosplenomegaly. There is no tenderness.   Musculoskeletal: Normal Muscle tone  Extremities: No edema. Pulses are palpable in all 4 extremities.  Neurological: Patient is alert and oriented to person, place, and time. Cranial nerves II-XII are grossly intact with no focal deficits.  Skin: Skin is warm. No rash noted. Nails show no clubbing.  No cyanosis or erythema.    Discharge Disposition  Home    Visiting Nurse:    Yes     Home PT/OT:  Yes     Home Safety Evaluation:  Yes     DME  None needed    Discharge Diet:         Dietary Orders            Start     Ordered    04/07/17 0915  Diet Regular  Diet Effective Now     Question:  Diet Texture / Consistency  Answer:  Regular    04/07/17 0914        Activity at Discharge:  As  tolerated    Pre-discharge education  Medications, follow up    Follow-up Appointments  No future appointments.  Additional Instructions for the Follow-ups that You Need to Schedule     Discharge Follow-up with PCP    As directed    Follow Up Details:  1 week       Discharge Follow-up with Specialty    As directed    Specialty:  Dr. Conley   Follow Up:  2 Weeks                 Test Results Pending at Discharge   Order Current Status    CBC & Differential In process    Slide Review, Hematology In process           Ana Bentley, APRN  04/07/17  9:33 AM    Time: Discharge 30 min (if over 30 minutes give explanation as to why it took greater than 30 minutes)

## 2017-04-07 NOTE — PAYOR COMM NOTE
"Paulo Hampton N (86 y.o. Female)     ATTN:NURSE REVIEWER   AUTH#[id number]  ORDER TO INITIATE OBS       Date of Birth Social Security Number Address Home Phone MRN    05/27/1930  [address] [facility] [address] [phone] [id number]    [employer] Marital Status          None Unknown       Admission Date Admission Type Admitting Provider Attending Provider Department, Room/Bed    4/5/17 Elective Eliel Robles MD  [facility] SURG, 1406/1    Discharge Date Discharge Disposition Discharge Destination        4/7/2017 Home or Self Care             Attending Provider: (none)    Allergies:  Biaxin [Clarithromycin], Ceftin [Cefuroxime], Celexa [Citalopram], Chlorpheniramine, Ciprofloxacin, Crestor [Rosuvastatin], Doxycycline, Effexor [Venlafaxine], Fluoxetine, Hydrocodone Bitartrate Er, Imipramine Hcl, Levaquin [Levofloxacin], Lexapro [Escitalopram Oxalate], Macrobid [Nitrofurantoin Monohyd Macro], Methscopolamine, Phenylpropanolamine, Prednisone, Promethazine, Simvastatin, Verapamil, Welchol [Colesevelam Hcl], Zetia [Ezetimibe], Zoloft [Sertraline Hcl], Penicillins    Isolation:  Spore   Infection:  C.difficile (04/06/17)   Code Status:  FULL    Ht:  64\" (162.6 cm)   Wt:  142 lb (64.4 kg)    Admission Cmt:  None   Principal Problem:  None                Active Insurance as of 4/5/2017     Primary Coverage     Payor Plan Insurance Group Employer/Plan Group    HU[employer] MEDICARE REPLACEMENT HUMANA MEDICARE REPL [id number]     Payor Plan Address Payor Plan Phone Number Effective From Effective To    [address] [address] [phone] 1/1/2013     [place] [address]       Subscriber Name Subscriber Birth Date Member ID       PAULO HAMPTON 5/27/1930 [id number]                 Emergency Contacts      (Rel.) Home Phone Work Phone Mobile Phone    KeithNelda (Relative) [phone] -- --        Initiate Observation Status [ADT12] (Order [id number])   Admission    Date: 4/6/2017 "   Department: [facility] MED SURG   Released By/Authorizing: Mery Juarez DO (auto-released)           Order History  Inpatient       Date/Time Action Taken User Additional Information       04/06/17 1341 Release Mery Juarez DO (auto-released) From Order: [id number]       04/06/17 1341 Complete Mery Juarez DO            Order Details        Frequency Duration Priority Order Class       Once 1  occurrence Routine Hospital Performed           Start Date/Time        Start Date Start Time       04/06/17 1342           Order Questions        Question Answer Comment       Level of Care Med/Surg        Diagnosis Lower GI bleed            Completion Info        User Date/Time       Mery Juarez DO 04/06/17 1341           Acknowledgement Info        For At Acknowledged By Acknowledged On       Placing Order 04/06/17 1341 Gloria Ulrich RN 04/06/17 4221           Reprint Order Requisition        Initiate Observation Status (Order #[id number]) on 4/6/17         Encounter-Level Cardiology Documents:        There are no encounter-level cardiology documents.         Encounter        View Encounter         Order Provider Info            Office phone Pager E-mail       Ordering User Mery Juarez [phone] -- --       Authorizing Provider Mery Juarez [phone] -- --           Linked Charges        No charges linked to this procedure         Order Transmittal Tracking        Initiate Observation Status (Order #[id number]) on 4/6/17         Authorized by: Mery Juarez DO (NPI: [id number])

## 2017-04-07 NOTE — NURSING NOTE
Continued Stay Note  PAULO Pillai     Patient Name: Paulo Hampton  MRN: [id number]  Today's Date: 4/7/2017    Admit Date: 4/5/2017          Discharge Plan       04/07/17 1016    Case Management/Social Work Plan    Plan plan is home today with [facility].    Additional Comments spoke with patient at bedside regarding discharge planning and home health agencies. patient given list of agencies. she did not have a preference and agreeable to use [facility]. spoke with Lour[facility] @ [facility] r/t referral. plan is home today. will continue to follow.                 Discharge Codes     None        Expected Discharge Date and Time     Expected Discharge Date Expected Discharge Time    Apr 7, 2017             Bertha Meyer RN

## 2017-04-07 NOTE — PAYOR COMM NOTE
"Paulo Ramirez N (86 y.o. Female)     ATTN: JUSTIN   APOLOGIES FOR THE MIX UP. PLEASE REVIEW AND REPLY TO TORRES ELLIOTT AT FAX#[phone] OR PHONE#[phone]      Date of Birth Social Security Number Address Home Phone MRN    05/27/1930  [address] [facility] [address] [phone] [id number]    [employer]oda[employer] Marital Status          None Unknown       Admission Date Admission Type Admitting Provider Attending Provider Department, Room/Bed    4/5/17 Elective Travis, MD Travis Estrada, Eliel THIBODEAUX MD [facility] SURG, 1406/1    Discharge Date Discharge Disposition Discharge Destination                      Attending Provider: Eliel Robles MD     Allergies:  Biaxin [Clarithromycin], Ceftin [Cefuroxime], Celexa [Citalopram], Chlorpheniramine, Ciprofloxacin, Crestor [Rosuvastatin], Doxycycline, Effexor [Venlafaxine], Fluoxetine, Hydrocodone Bitartrate Er, Imipramine Hcl, Levaquin [Levofloxacin], Lexapro [Escitalopram Oxalate], Macrobid [Nitrofurantoin Monohyd Macro], Methscopolamine, Phenylpropanolamine, Prednisone, Promethazine, Simvastatin, Verapamil, Welchol [Colesevelam Hcl], Zetia [Ezetimibe], Zoloft [Sertraline Hcl], Penicillins    Isolation:  Spore   Infection:  C.difficile (04/06/17)   Code Status:  FULL    Ht:  64\" (162.6 cm)   Wt:  142 lb (64.4 kg)    Admission Cmt:  None   Principal Problem:  None                Active Insurance as of 4/5/2017     Primary Coverage     Payor Plan Insurance Group Employer/Plan Group    HUMANA MEDICARE REPLACEMENT HUMANA MEDICARE REPL [id number]     Payor Plan Address Payor Plan Phone Number Effective From Effective To    [address] [address] [phone] 1/1/2013     [place] [address]       Subscriber Name Subscriber Birth Date Member ID       PAULO RAMIREZ 5/27/1930 [id number]                 Emergency Contacts      (Rel.) Home Phone Work Phone Mobile Phone    Nelda Parker (Relative) [phone] -- --               History & Physical "      Jackelyn Valentin MD at 4/5/2017  8:25 PM          [facility] HOSPITALIST     Laci Freedman MD    CHIEF COMPLAINT: Rectal bleeding    HISTORY OF PRESENT ILLNESS:    85 y/o female with hypertension, dyslipidemia, hypothyroidism, GERD, anxiety and depression and h/o hemorrhoids with bleeding and diverticulosis was transferred here from [facility] for GI evaluation of rectal bleeding (they do not have GI coverage this week). The patient notes this bleeding started 3 days ago.  She notes passing bright red blood mostly in the morning the last 3 days. Then loose stools and cramping with her bowel movements throughout the remainder of the day. She denies any N/V and notes her appetite has been normal.  She was started on Bactrim for a sinus and ear infection 3/28/17 and notes she still has some post-nasal gtt and sinus pressure but it has improved with the Abx. The patient denies any fever, cough, SOA.  She notes intermittent dizziness since her sinus problems began which has also improved.  She has had bleeding from her hemorroids in the past but notes this is much worse.  She denies any CP or heart palpitations. She reports a colonoscopy done by Dr. Conley last year that showed diverticulosis and I have reviewed records of a colonoscopy done in 2014 that also showed diverticular disease and hemorrhoids. Patient denies any weakness.       Past Medical History:   Diagnosis Date   • Anxiety    • Hypertension    • Hypothyroid    • Sinus congestion      Past Surgical History:   Procedure Laterality Date   • ABDOMINAL SURGERY     • HYSTERECTOMY     • THYROIDECTOMY       Family History   Problem Relation Age of Onset   • Heart disease Father    • Cancer Sister    • Cancer Brother      Social History   Substance Use Topics   • Smoking status: Never Smoker   • Smokeless tobacco: None   • Alcohol use No     Prescriptions Prior to Admission   Medication Sig Dispense Refill Last Dose    • aspirin 81 MG EC tablet Take 81 mg by mouth Daily.   4/5/2017 at 0900   • benzonatate (TESSALON) 100 MG capsule Take 100 mg by mouth 3 (Three) Times a Day As Needed for Cough.      • Calcium Carbonate-Vitamin D (CALCIUM 500 + D PO) Take 1 tablet by mouth 2 (Two) Times a Day.   4/5/2017 at 0900   • clotrimazole-betamethasone (LOTRISONE) 1-0.05 % cream Apply 1 application topically 2 (Two) Times a Day As Needed (HEMMROIDS).   4/4/2017 at 0900   • dicyclomine (BENTYL) 10 MG capsule Take 10 mg by mouth 4 (Four) Times a Day Before Meals & at Bedtime As Needed (ABDOMINAL PAIN).   4/5/2017 at 0900   • diltiaZEM (CARDIZEM) 30 MG tablet Take 30 mg by mouth 2 (Two) Times a Day.   4/5/2017 at 0900   • docusate sodium (COLACE) 100 MG capsule Take 100 mg by mouth 2 (Two) Times a Day As Needed for Constipation.   4/1/2017 at 0900   • fluticasone (FLONASE) 50 MCG/ACT nasal spray 2 sprays into each nostril Daily.   4/4/2017 at 2100   • guaiFENesin (MUCINEX) 600 MG 12 hr tablet Take 600 mg by mouth Every 12 (Twelve) Hours As Needed for Cough.      • levocetirizine (XYZAL) 5 MG tablet Take 5 mg by mouth Every Evening.   4/4/2017 at 2100   • levothyroxine (SYNTHROID) 50 MCG tablet Take 50 mcg by mouth Daily.   4/5/2017 at 0900   • LORazepam (ATIVAN) 1 MG tablet Take 1 mg by mouth 2 (Two) Times a Day.   4/5/2017 at 0900   • meclizine (ANTIVERT) 12.5 MG tablet Take 12.5 mg by mouth 3 (Three) Times a Day As Needed for dizziness.      • Multiple Vitamins-Minerals (CENTRUM SILVER PO) Take 1 tablet by mouth Daily.   4/4/2017 at 2100   • pantoprazole (PROTONIX) 40 MG EC tablet Take 40 mg by mouth Daily.   4/5/2017 at 0900   • Potassium Chloride (KLOR-CON 10 PO) Take 10 mEq by mouth Daily.   4/5/2017 at 0900   • sulfamethoxazole-trimethoprim (BACTRIM,SEPTRA) 400-80 MG tablet Take 1 tablet by mouth 2 (Two) Times a Day.   4/5/2017 at 0900     Allergies:  Biaxin [clarithromycin]; Ceftin [cefuroxime]; Celexa [citalopram]; Chlorpheniramine;  Ciprofloxacin; Crestor [rosuvastatin]; Doxycycline; Effexor [venlafaxine]; Fluoxetine; Hydrocodone bitartrate er; Imipramine hcl; Levaquin [levofloxacin]; Lexapro [escitalopram oxalate]; Macrobid [nitrofurantoin monohyd macro]; Methscopolamine; Phenylpropanolamine; Prednisone; Promethazine; Simvastatin; Verapamil; Welchol [colesevelam hcl]; Zetia [ezetimibe]; Zoloft [sertraline hcl]; and Penicillins    REVIEW OF SYSTEMS:  Please see the above history of present illness for pertinent positives and negatives.  The remainder of the patient's systems have been reviewed and are negative.     Vital Signs  Temp:  [98.7 °F (37.1 °C)] 98.7 °F (37.1 °C)  Heart Rate:  [78] 78  Resp:  [18] 18  BP: (175)/(85) 175/85  Oxygen Therapy  SpO2: 98 %  There is no height or weight on file to calculate BMI.         Physical Exam:  Physical Exam   Constitutional: Patient appears well-developed and well-nourished and in no acute distress   HEENT:   Head: Normocephalic and atraumatic.   Eyes:  Pupils are equal, round, and reactive to light. EOM are intact. Sclera are anicteric and non-injected.  Mouth and Throat: Patient has moist mucous membranes. Oropharynx is clear of any erythema or exudate.     Neck: Neck supple. No JVD present. No thyromegaly present. No lymphadenopathy present.  Cardiovascular: Regular rate, regular rhythm, S1 normal and S2 normal.  Exam reveals no gallop and no friction rub.  No murmur heard.  Pulmonary/Chest: Lungs are clear to auscultation bilaterally. No respiratory distress. No wheezes. No rhonchi. No rales.   Abdominal: Soft. Bowel sounds are normal. No distension and no mass. There is no hepatosplenomegaly. Patient notes mild diffuse tenderness on exam.   Musculoskeletal: Normal Muscle tone  Extremities: No edema. Pulses are palpable in all 4 extremities.  Neurological: Patient is alert and oriented to person, place, and time. Cranial nerves II-XII are grossly intact with no focal deficits.  Skin: Skin is  warm. Nails show no clubbing.  No cyanosis or erythema.     Results Review:    I reviewed the patient's new clinical results.  Lab Results (most recent)     Labs from [facility] reviewed.          Imaging Results (most recent)    C CT abdomen and pelvis prelim results from [facility] reviewed.                     Assessment/Plan     1. Rectal bleeding/Lower GI bleed: I have consulted GI and Dr. Ceja has seen the patient.  Suspected diverticular bleeding source.  I wonder if this may have started with diarrhea that could be from the patient's antibiotics.  Will start a probiotic.  Keep patient on clear liquids for now, suspect this will be self limited. Monitor. CT scan at outside hospital showed diverticulosis and nothing acute. Will check c-diff although with NL WNL, NL CT abdomen and afebrile I do believe there is any infectious component to the patient's symptoms.    2. Acute blood loss anemia: Patient's Hb in 10/2016 was 13.8 per [facility] records, now 11.8.  Patient received 1L IVF at [facility] ER so I would expect this to dilute some on next check.  Monitor Hb.     3. Recent sinus and ear infection: Patient on Bactrim due to multiple drug allergies.  Creatinine is up slightly and may be secondary to this, patient notes symptoms improving. Will continue to complete course but monitor renal function closely. Will have pharmacy check dosing for renal function.    4. Mild EDWIN on CKD stage III: Possibly secondary to Bactrim or patient's blood loss.  Just slightly above baseline. Creatinine was 0.98 with GFR in 50s in 10/2016.  Now 1.33 and GFR in 30s.  Patient received 1L IVFs at outside ER. She is eating and drinking normally per her report. No further IVFs tonight as this will further dilute Hb. Monitor renal function closely.     5. Hypertension: BP elevated but patient has not had all her medications today. Resume home diltiazem. Monitor.    5. Dyslipidemia: On no chronic medications for this,  recommend F/U with PCP at discharge.    6. Hypothyroidism: continue home supplementation.    7. GERD: Continue protonix, no acute issues.    8. Anxiety and depression: Continue home dose ativan. No acute issues here.    9. DVT prophylaxis: SCDs, no AC with patient bleeding.      I discussed the patients findings and my recommendations with patient.     Jackelyn Valentin MD  04/05/17  9:50 PM           Electronically signed by Jackelyn Valentin MD at 4/5/2017 10:09 PM        Hospital Medications (all)       Dose Frequency Start End    acetaminophen (TYLENOL) tablet 650 mg 650 mg Every 4 Hours PRN 4/5/2017     Sig - Route: Take 2 tablets by mouth Every 4 (Four) Hours As Needed for Mild Pain (1-3). - Oral    benzonatate (TESSALON) capsule 100 mg 100 mg 3 Times Daily PRN 4/5/2017     Sig - Route: Take 1 capsule by mouth 3 (Three) Times a Day As Needed for Cough. - Oral    calcium-vitamin D 500-200 MG-UNIT per tablet 1 tablet 1 tablet 2 Times Daily 4/6/2017     Sig - Route: Take 1 tablet by mouth 2 (Two) Times a Day. - Oral    cetirizine (zyrTEC) tablet 5 mg 5 mg Nightly 4/5/2017     Sig - Route: Take 0.5 tablets by mouth Every Night. - Oral    clotrimazole-betamethasone (LOTRISONE) 1-0.05 % cream 1 application 1 application 2 Times Daily PRN 4/5/2017     Sig - Route: Apply 1 application topically 2 (Two) Times a Day As Needed (HEMMROIDS). - Topical    dicyclomine (BENTYL) capsule 10 mg 10 mg 4 Times Daily Before Meals & Nightly PRN 4/5/2017     Sig - Route: Take 1 capsule by mouth 4 (Four) Times a Day Before Meals & at Bedtime As Needed (ABDOMINAL PAIN). - Oral    diltiaZEM (CARDIZEM) tablet 30 mg 30 mg 2 Times Daily 4/6/2017     Sig - Route: Take 1 tablet by mouth 2 (Two) Times a Day. - Oral    docusate sodium (COLACE) capsule 100 mg 100 mg 2 Times Daily PRN 4/5/2017     Sig - Route: Take 1 capsule by mouth 2 (Two) Times a Day As Needed for Constipation. - Oral    fluticasone (FLONASE) 50 MCG/ACT  nasal spray 2 spray 2 spray Daily 4/6/2017     Sig - Route: 2 sprays into each nostril Daily. - Nasal    guaiFENesin (MUCINEX) 12 hr tablet 600 mg 600 mg Every 12 Hours PRN 4/5/2017     Sig - Route: Take 1 tablet by mouth Every 12 (Twelve) Hours As Needed for Cough. - Oral    lactobacillus acidophilus (RISAQUAD) capsule 1 capsule 1 capsule Daily 4/5/2017     Sig - Route: Take 1 capsule by mouth Daily. - Oral    levothyroxine (SYNTHROID, LEVOTHROID) tablet 50 mcg 50 mcg Every Early Morning 4/6/2017     Sig - Route: Take 1 tablet by mouth Every Morning. - Oral    LORazepam (ATIVAN) tablet 1 mg 1 mg Every 12 Hours Scheduled 4/6/2017     Sig - Route: Take 1 tablet by mouth Every 12 (Twelve) Hours. - Oral    meclizine (ANTIVERT) tablet 12.5 mg 12.5 mg 3 Times Daily PRN 4/5/2017     Sig - Route: Take 0.5 tablets by mouth 3 (Three) Times a Day As Needed for dizziness. - Oral    metroNIDAZOLE (FLAGYL) tablet 500 mg 500 mg Every 8 Hours Scheduled 4/7/2017 4/17/2017    Sig - Route: Take 1 tablet by mouth Every 8 (Eight) Hours. - Oral    multivitamin with minerals 1 tablet 1 tablet Daily 4/6/2017     Sig - Route: Take 1 tablet by mouth Daily. - Oral    pantoprazole (PROTONIX) EC tablet 40 mg 40 mg Daily 4/6/2017     Sig - Route: Take 1 tablet by mouth Daily. - Oral    Pharmacy Consult - Pharmacy to dose  Continuous PRN 4/5/2017     Sig - Route: Continuous As Needed for Consult. - Does not apply    potassium chloride (K-DUR) CR tablet 10 mEq 10 mEq Daily 4/6/2017     Sig - Route: Take 1 tablet by mouth Daily. - Oral    sodium chloride 0.9 % flush 1-10 mL 1-10 mL As Needed 4/5/2017     Sig - Route: Infuse 1-10 mL into a venous catheter As Needed for Line Care. - Intravenous    sulfamethoxazole-trimethoprim (BACTRIM,SEPTRA) 200-40 MG/5ML suspension 80 mg 80 mg Every 12 Hours Scheduled 4/5/2017 4/7/2017    Sig - Route: Take 10 mL by mouth Every 12 (Twelve) Hours. - Oral    LORazepam (ATIVAN) tablet 1 mg (Discontinued) 1 mg 2 Times  Daily 4/6/2017 4/6/2017    Sig - Route: Take 1 tablet by mouth 2 (Two) Times a Day. - Oral    metroNIDAZOLE (FLAGYL) IVPB 500 mg (Discontinued) 500 mg Every 8 Hours 4/6/2017 4/7/2017    Sig - Route: Infuse 100 mL into a venous catheter Every 8 (Eight) Hours. - Intravenous          Lab Results (last 72 hours)     Procedure Component Value Units Date/Time    Hemoglobin & Hematocrit, Blood [[id number]]  (Abnormal) Collected:  04/05/17 2258    Specimen:  Blood Updated:  04/05/17 2302     Hemoglobin 11.4 (L) g/dL      Hematocrit 35.0 (L) %     TSH [[id number]]  (Normal) Collected:  04/05/17 2258    Specimen:  Blood Updated:  04/05/17 2352     TSH 0.750 mIU/mL     CBC Auto Differential [[id number]]  (Abnormal) Collected:  04/06/17 0330    Specimen:  Blood Updated:  04/06/17 0416     WBC 6.60 10*3/mm3      RBC 3.62 (L) 10*6/mm3      Hemoglobin 11.0 (L) g/dL      Hematocrit 33.9 (L) %      MCV 93.6 fL      MCH 30.4 pg      MCHC 32.4 g/dL      RDW 12.8 %      RDW-SD 44.0 fl      MPV 10.9 (H) fL      Platelets 214 10*3/mm3      Neutrophil % 42.7 (L) %      Lymphocyte % 48.3 (H) %      Monocyte % 7.4 %      Eosinophil % 1.2 %      Basophil % 0.2 %      Immature Grans % 0.2 %      Neutrophils, Absolute 2.82 10*3/mm3      Lymphocytes, Absolute 3.19 10*3/mm3      Monocytes, Absolute 0.49 10*3/mm3      Eosinophils, Absolute 0.08 (L) 10*3/mm3      Basophils, Absolute 0.01 10*3/mm3      Immature Grans, Absolute 0.01 10*3/mm3      nRBC 0.0 /100 WBC     Basic Metabolic Panel [[id number]]  (Abnormal) Collected:  04/06/17 0330    Specimen:  Blood Updated:  04/06/17 0434     Glucose 85 mg/dL      BUN 16 mg/dL      Creatinine 1.29 (H) mg/dL      Sodium 139 mmol/L      Potassium 4.4 mmol/L      Chloride 103 mmol/L      CO2 22.8 mmol/L      Calcium 8.5 (L) mg/dL      eGFR Non African Amer 39 (L) mL/min/1.73      BUN/Creatinine Ratio 12.4     Anion Gap 13.2 mmol/L     Narrative:       The MDRD GFR formula is only valid for adults with stable renal  function between ages 18 and 70.    Clostridium Difficile Toxin, PCR [[id number]]  (Abnormal) Collected:  04/06/17 0803    Specimen:  Stool from Per Rectum Updated:  04/06/17 1313     C. Difficile Toxins by PCR Positive (C)    Basic Metabolic Panel [[id number]]  (Abnormal) Collected:  04/07/17 0339    Specimen:  Blood Updated:  04/07/17 0445     Glucose 91 mg/dL      BUN 10 mg/dL      Creatinine 1.08 (H) mg/dL      Sodium 136 mmol/L      Potassium 4.3 mmol/L      Chloride 100 mmol/L      CO2 22.7 mmol/L      Calcium 8.6 (L) mg/dL      eGFR Non African Amer 48 (L) mL/min/1.73      BUN/Creatinine Ratio 9.3     Anion Gap 13.3 mmol/L     Narrative:       The MDRD GFR formula is only valid for adults with stable renal function between ages 18 and 70.    Slide Review, Hematology [[id number]] Collected:  04/07/17 0339    Specimen:  Blood Updated:  04/07/17 0618    CBC Auto Differential [[id number]]  (Abnormal) Collected:  04/07/17 0339    Specimen:  Blood Updated:  04/07/17 0619     WBC 5.69 10*3/mm3      RBC 3.75 (L) 10*6/mm3      Hemoglobin 11.3 (L) g/dL      Hematocrit 34.9 (L) %      MCV 93.1 fL      MCH 30.1 pg      MCHC 32.4 g/dL      RDW 12.7 %      RDW-SD 43.2 fl      MPV 10.9 (H) fL      Platelets 219 10*3/mm3     Manual Differential [[id number]]  (Abnormal) Collected:  04/07/17 0339    Specimen:  Blood Updated:  04/07/17 0619     Neutrophil % 32.0 (L) %      Lymphocyte % 60.0 (H) %      Monocyte % 6.0 %      Eosinophil % 2.0 %      Neutrophils Absolute 1.82 10*3/mm3      Lymphocytes Absolute 3.41 10*3/mm3      Monocytes Absolute 0.34 10*3/mm3      Eosinophils Absolute 0.11 10*3/mm3      RBC Morphology Normal     WBC Morphology Normal     Platelet Estimate Adequate    CBC & Differential [[id number]] Collected:  04/07/17 0339    Specimen:  Blood Updated:  04/07/17 0626    Narrative:       The following orders were created for panel order CBC & Differential.  Procedure                               Abnormality         Status     "                 ---------                               -----------         ------                     Manual Differential[[id number]]           Abnormal            Final result               Scan Slide[[id number]]                                                                   CBC Auto Differential[[id number]]         Abnormal            Final result               Slide Review, Hematology[[id number]]                          In process                   Please view results for these tests on the individual orders.               Physician Progress Notes (last 72 hours) (Notes from 4/4/2017  9:18 AM through 4/7/2017  9:18 AM)      ELIEZER Ward at 4/6/2017  9:34 AM  Version 1 of 1    Attestation signed by Mery Juarez DO at 4/6/2017 10:58 AM        I have reviewed the documentation above and agree.                                 SERVICE: [facility] HOSPITALIST    CONSULTANTS: GI    CHIEF COMPLAINT: f/u LGIB/BRBPR    SUBJECTIVE: The patient reports feeling well, no abdominal pain and only having small amount of blood on toilet tissue with wiping, none in commode/stool. She reports not much appetite but no nausea/vomiting.   Denies f/c/cough/soa/chest pain/abdominal pain or other new concerns.    OBJECTIVE:    /75 (BP Location: Right arm, Patient Position: Sitting)  Pulse 78  Temp 97.9 °F (36.6 °C) (Oral)   Resp 18  Ht 64\" (162.6 cm)  Wt 142 lb (64.4 kg)  SpO2 100%  BMI 24.37 kg/m2    MEDS/LABS REVIEWED AND ORDERED    calcium-vitamin D 1 tablet Oral BID   cetirizine 5 mg Oral Nightly   diltiaZEM 30 mg Oral BID   fluticasone 2 spray Nasal Daily   lactobacillus acidophilus 1 capsule Oral Daily   levothyroxine 50 mcg Oral Q AM   LORazepam 1 mg Oral BID   multivitamin with minerals 1 tablet Oral Daily   pantoprazole 40 mg Oral Daily   potassium chloride 10 mEq Oral Daily   sulfamethoxazole-trimethoprim 80 mg Oral Q12H     Physical Exam   Constitutional: She is oriented to person, " place, and time. She appears well-developed and well-nourished.   HENT:   Head: Normocephalic and atraumatic.   Eyes: EOM are normal. Pupils are equal, round, and reactive to light.   Cardiovascular: Normal rate, regular rhythm and normal heart sounds.  Exam reveals no gallop and no friction rub.    No murmur heard.  Pulmonary/Chest: Effort normal and breath sounds normal. No respiratory distress. She has no wheezes. She has no rales.   Abdominal: Soft. She exhibits no distension. There is no tenderness. There is no rebound and no guarding.   Hyperactive bowel sounds   Musculoskeletal: She exhibits no edema.   Neurological: She is alert and oriented to person, place, and time.   Skin: Skin is warm and dry. No erythema.   Psychiatric: She has a normal mood and affect. Her behavior is normal. Judgment and thought content normal.   Vitals reviewed.    LAB/DIAGNOSTICS:    Lab Results (last 24 hours)     Procedure Component Value Units Date/Time    Hemoglobin & Hematocrit, Blood [[id number]]  (Abnormal) Collected:  04/05/17 2258    Specimen:  Blood Updated:  04/05/17 2302     Hemoglobin 11.4 (L) g/dL      Hematocrit 35.0 (L) %     TSH [[id number]]  (Normal) Collected:  04/05/17 2258    Specimen:  Blood Updated:  04/05/17 2352     TSH 0.750 mIU/mL     CBC Auto Differential [[id number]]  (Abnormal) Collected:  04/06/17 0330    Specimen:  Blood Updated:  04/06/17 0416     WBC 6.60 10*3/mm3      RBC 3.62 (L) 10*6/mm3      Hemoglobin 11.0 (L) g/dL      Hematocrit 33.9 (L) %      MCV 93.6 fL      MCH 30.4 pg      MCHC 32.4 g/dL      RDW 12.8 %      RDW-SD 44.0 fl      MPV 10.9 (H) fL      Platelets 214 10*3/mm3      Neutrophil % 42.7 (L) %      Lymphocyte % 48.3 (H) %      Monocyte % 7.4 %      Eosinophil % 1.2 %      Basophil % 0.2 %      Immature Grans % 0.2 %      Neutrophils, Absolute 2.82 10*3/mm3      Lymphocytes, Absolute 3.19 10*3/mm3      Monocytes, Absolute 0.49 10*3/mm3      Eosinophils, Absolute 0.08 (L) 10*3/mm3       Basophils, Absolute 0.01 10*3/mm3      Immature Grans, Absolute 0.01 10*3/mm3      nRBC 0.0 /100 WBC     Basic Metabolic Panel [[id number]]  (Abnormal) Collected:  04/06/17 0330    Specimen:  Blood Updated:  04/06/17 0434     Glucose 85 mg/dL      BUN 16 mg/dL      Creatinine 1.29 (H) mg/dL      Sodium 139 mmol/L      Potassium 4.4 mmol/L      Chloride 103 mmol/L      CO2 22.8 mmol/L      Calcium 8.5 (L) mg/dL      eGFR Non African Amer 39 (L) mL/min/1.73      BUN/Creatinine Ratio 12.4     Anion Gap 13.2 mmol/L     Narrative:       The MDRD GFR formula is only valid for adults with stable renal function between ages 18 and 70.    Clostridium Difficile Toxin, PCR [[id number]] Collected:  04/06/17 0803    Specimen:  Stool from Per Rectum Updated:  04/06/17 0813        ASSESSMENT/PLAN:  1. LGIB/rectal bleeding: GI following  Diverticular bleeding suspected  Probiotic started  Advance to full liquids  C-dif pending  Continue dicyclomine/probiotic/PPI    2. Acute blood loss anemia:  Hgb 11.0 today, stable   Baseline 10/2016 13.8   Received IV hydration at previous facility  Monitor closely      3. Recent sinus and ear infection:   On Bactrim secondary to drug allergies-pharmacy consulted yesterday to dose  Lymphs high, neutrophils low    4. Acute kidney injury on CKD 3:   Creatinine seems at baseline with past 1.32 in 6/2016 and 0.98, currently 1.29   Monitor     5. Hypertension: at goal for age     5. Dyslipidemia: no meds, no acute issues  F/U Laci Freedman MD     6. Hypothyroidism: no acute issues on home levothyroxine 50 mcg     7. GERD: no acute issues on protonix 40 mg daily     8. Anxiety and depression: no acute issues on lorazepam 1 mg bid     9. DVT prophylaxis: PPI/SCDs           Electronically signed by Mery Juarez DO at 4/6/2017 10:58 AM      Mery Juarez DO at 4/6/2017  1:55 PM  Version 1 of 1         C. Diff test positive  C. Diff Colitis  I have started patient on Flagyl 500mg IV Q8hr.      "Electronically signed by Mery Juarez DO at 4/6/2017  1:56 PM      Bharathi Ceja MD at 4/6/2017  7:43 PM  Version 1 of 1            LOS: 1 day   Patient Care Team:  Laci Freedman MD as PCP - General (Family Medicine)    Chief Complaint: BLEEDING    Subjective     History of Present Illness    Subjective:  Symptoms:  Improved.  She reports anxiety.    Diet:  Adequate intake.    Activity level: Impaired due to weakness.    Pain:  She reports no pain.      NO FURTHER BLEEDIng  SHE DENIES DIARRHEA  Feels well wants to go home   History taken from: patient chart    Objective     Vital Signs  Temp:  [97.3 °F (36.3 °C)-98.7 °F (37.1 °C)] 98.5 °F (36.9 °C)  Heart Rate:  [67-78] 74  Resp:  [18-20] 20  BP: (117-180)/(56-85) 117/56    Objective:  General Appearance:  Comfortable.    Vital signs: (most recent): Blood pressure 117/56, pulse 74, temperature 98.5 °F (36.9 °C), temperature source Oral, resp. rate 20, height 64\" (162.6 cm), weight 142 lb (64.4 kg), SpO2 94 %.  Vital signs are normal.    Lungs:  Normal effort.    Heart: Normal rate.    Abdomen: Abdomen is soft.    Neurological: Patient is alert and oriented to person, place and time.              Results Review:     I reviewed the patient's new clinical results.    Medication Review:     Assessment/Plan     Active Problems:    Acute lower GI bleeding    Lower GI bleed      Assessment:  (Lower gastrointestinal bleed improved likely etiology is diverticular bleeding  Clostridia toxin positive stool ).     Plan:   (Patient is stable and can be discharged from GI viewpoint  Certainly reasonable to treat the clostridia difficile + toxin,  Feel she is clinically stable at the present time.  Follow up with GI as needed. ).       Bharathi Ceja MD  04/06/17  7:43 PM    Time: Critical care 15 min         Electronically signed by Bharathi Ceja MD at 4/6/2017  7:45 PM           Consult Notes (last 72 hours) (Notes from 4/4/2017  9:18 AM through 4/7/2017  9:18 AM)    "   Bharathi Ceja MD at 4/5/2017  8:54 PM  Version 1 of 1         Consults    Patient Care Team:  Laci Freedman MD as PCP - General (Family Medicine)    Chief complaint: bleeding    Subjective     History of Present Illness  Pleasant elderly lady with rectal bleeding.  SHe has a history of diverticulosis.  The bleeding did happen and became profuse,  No pain with it.  She went to [facility] and was told they did not have gastroenterology or surgery coverage and requested transfer here.  She has good appetite. Dr Conley had done a colonoscopy last year and she was told she had diverticulosis.  That was done for a similar presentation .  Review of Systems   Constitutional: Positive for fatigue.   Eyes: Negative.    Respiratory: Negative.    Cardiovascular: Negative.    Gastrointestinal: Positive for anal bleeding, blood in stool and rectal pain.   Endocrine: Negative.    Genitourinary: Negative.    Skin: Negative.    Allergic/Immunologic: Negative.    Neurological: Negative.    Hematological: Negative.    Psychiatric/Behavioral: Negative.    All other systems reviewed and are negative.       No past medical history on file., No past surgical history on file., No family history on file.,   Social History   Substance Use Topics   • Smoking status: Not on file   • Smokeless tobacco: Not on file   • Alcohol use Not on file   ,   Prescriptions Prior to Admission   Medication Sig Dispense Refill Last Dose   • aspirin 81 MG EC tablet Take 81 mg by mouth Daily.   4/5/2017 at 0900   • benzonatate (TESSALON) 100 MG capsule Take 100 mg by mouth 3 (Three) Times a Day As Needed for Cough.      • Calcium Carbonate-Vitamin D (CALCIUM 500 + D PO) Take 1 tablet by mouth 2 (Two) Times a Day.   4/5/2017 at 0900   • clotrimazole-betamethasone (LOTRISONE) 1-0.05 % cream Apply 1 application topically 2 (Two) Times a Day As Needed (HEMMROIDS).   4/4/2017 at 0900   • dicyclomine (BENTYL) 10 MG capsule Take 10 mg by mouth 4 (Four)  Times a Day Before Meals & at Bedtime As Needed (ABDOMINAL PAIN).   4/5/2017 at 0900   • diltiaZEM (CARDIZEM) 30 MG tablet Take 30 mg by mouth 2 (Two) Times a Day.   4/5/2017 at 0900   • docusate sodium (COLACE) 100 MG capsule Take 100 mg by mouth 2 (Two) Times a Day As Needed for Constipation.   4/1/2017 at 0900   • fluticasone (FLONASE) 50 MCG/ACT nasal spray 2 sprays into each nostril Daily.   4/4/2017 at 2100   • guaiFENesin (MUCINEX) 600 MG 12 hr tablet Take 600 mg by mouth Every 12 (Twelve) Hours As Needed for Cough.      • levocetirizine (XYZAL) 5 MG tablet Take 5 mg by mouth Every Evening.   4/4/2017 at 2100   • levothyroxine (SYNTHROID) 50 MCG tablet Take 50 mcg by mouth Daily.   4/5/2017 at 0900   • LORazepam (ATIVAN) 1 MG tablet Take 1 mg by mouth 2 (Two) Times a Day.   4/5/2017 at 0900   • meclizine (ANTIVERT) 12.5 MG tablet Take 12.5 mg by mouth 3 (Three) Times a Day As Needed for dizziness.      • Multiple Vitamins-Minerals (CENTRUM SILVER PO) Take 1 tablet by mouth Daily.   4/4/2017 at 2100   • pantoprazole (PROTONIX) 40 MG EC tablet Take 40 mg by mouth Daily.   4/5/2017 at 0900   • Potassium Chloride (KLOR-CON 10 PO) Take 10 mEq by mouth Daily.   4/5/2017 at 0900   • sulfamethoxazole-trimethoprim (BACTRIM,SEPTRA) 400-80 MG tablet Take 1 tablet by mouth 2 (Two) Times a Day.   4/5/2017 at 0900   , Scheduled Meds:  , Continuous Infusions:    No current facility-administered medications for this encounter. , PRN Meds:   and Allergies:  Biaxin [clarithromycin]; Ceftin [cefuroxime]; Celexa [citalopram]; Chlorpheniramine; Ciprofloxacin; Crestor [rosuvastatin]; Doxycycline; Effexor [venlafaxine]; Fluoxetine; Guaifenesin & derivatives; Hydrocodone bitartrate er; Imipramine hcl; Levaquin [levofloxacin]; Lexapro [escitalopram oxalate]; Macrobid [nitrofurantoin monohyd macro]; Methscopolamine; Phenylpropanolamine; Prednisone; Promethazine; Simvastatin; Sulfa antibiotics; Verapamil; Welchol [colesevelam hcl];  Zetia [ezetimibe]; Zoloft [sertraline hcl]; and Penicillins    Objective      Vital Signs  Temp:  [98.7 °F (37.1 °C)] 98.7 °F (37.1 °C)  Heart Rate:  [78] 78  Resp:  [18] 18  BP: (175)/(85) 175/85    Physical Exam   Constitutional: She is oriented to person, place, and time. She appears well-developed.   HENT:   Mouth/Throat: Oropharynx is clear and moist.   Eyes: Conjunctivae are normal.   Neck: Neck supple.   Cardiovascular: Normal rate and regular rhythm.    Pulmonary/Chest: Effort normal and breath sounds normal.   Abdominal: Soft. Bowel sounds are normal.   Neurological: She is alert and oriented to person, place, and time.   Skin: Skin is warm and dry.   Psychiatric: She has a normal mood and affect.       Results Review:    I reviewed the patient's new clinical results.  I reviewed the patient's new imaging results and agree with the interpretation.        Assessment/Plan     Active Problems:    * No active hospital problems. *      Assessment:  (Lower gastrointestinal bleed  Likely diverticular in origin  Doubt colonic ischemia).     Plan:   (Expectant rx  Most of the time these bleeding episodes stop on their own  If bleeding remains protracted  Could consider colonoscopy but given behaviour of diverticular bleeds, typically colonoscopy not helpful as the bleeding normally has stopped by the time patient is prepped and had the scope done.   She seems stable at present.  Doubt surgery will be required thanks ).       I discussed the patients findings and my recommendations with patient, family, nursing staff and primary care team    Bharathi Ceja MD  04/05/17  8:54 PM    Time: Critical care 25 min         Electronically signed by Bharathi Ceja MD at 4/5/2017  8:59 PM

## 2017-04-07 NOTE — PLAN OF CARE
Problem: Patient Care Overview (Adult)  Goal: Plan of Care Review  Outcome: Ongoing (interventions implemented as appropriate)    04/07/17 0512   Coping/Psychosocial Response Interventions   Plan Of Care Reviewed With patient   Patient Care Overview   Progress no change   Outcome Evaluation   Outcome Summary/Follow up Plan vss, full liquid diet, c diff positive         Problem: Fall Risk (Adult)  Goal: Identify Related Risk Factors and Signs and Symptoms  Outcome: Ongoing (interventions implemented as appropriate)    04/07/17 0512   Fall Risk   Fall Risk: Related Risk Factors environment unfamiliar   Fall Risk: Signs and Symptoms presence of risk factors       Goal: Absence of Falls  Outcome: Ongoing (interventions implemented as appropriate)    04/07/17 0512   Fall Risk (Adult)   Absence of Falls making progress toward outcome         Problem: Pain, Acute (Adult)  Goal: Identify Related Risk Factors and Signs and Symptoms  Outcome: Ongoing (interventions implemented as appropriate)    04/07/17 0512   Pain, Acute   Related Risk Factors (Acute Pain) patient perception   Signs and Symptoms (Acute Pain) verbalization of pain descriptors       Goal: Acceptable Pain Control/Comfort Level  Outcome: Ongoing (interventions implemented as appropriate)    04/07/17 0512   Pain, Acute (Adult)   Acceptable Pain Control/Comfort Level making progress toward outcome

## 2017-04-17 LAB
CYTOLOGIST CVX/VAG CYTO: NORMAL
PATH INTERP BLD-IMP: NORMAL

## 2017-07-03 ENCOUNTER — ANESTHESIA EVENT (OUTPATIENT)
Dept: PERIOP | Facility: HOSPITAL | Age: 82
End: 2017-07-03

## 2017-07-05 ENCOUNTER — ANESTHESIA (OUTPATIENT)
Dept: PERIOP | Facility: HOSPITAL | Age: 82
End: 2017-07-05

## 2017-07-05 ENCOUNTER — PREP FOR SURGERY (OUTPATIENT)
Dept: OTHER | Facility: HOSPITAL | Age: 82
End: 2017-07-05

## 2017-07-05 ENCOUNTER — ON CAMPUS - OUTPATIENT (OUTPATIENT)
Dept: URBAN - METROPOLITAN AREA HOSPITAL 28 | Facility: HOSPITAL | Age: 82
End: 2017-07-05

## 2017-07-05 ENCOUNTER — HOSPITAL ENCOUNTER (OUTPATIENT)
Facility: HOSPITAL | Age: 82
Setting detail: HOSPITAL OUTPATIENT SURGERY
Discharge: HOME | End: 2017-07-05
Attending: INTERNAL MEDICINE | Admitting: INTERNAL MEDICINE

## 2017-07-05 VITALS
SYSTOLIC BLOOD PRESSURE: 194 MMHG | DIASTOLIC BLOOD PRESSURE: 72 MMHG | RESPIRATION RATE: 15 BRPM | WEIGHT: 139 LBS | HEIGHT: 64 IN | TEMPERATURE: 97.6 F | HEART RATE: 70 BPM | BODY MASS INDEX: 23.73 KG/M2 | OXYGEN SATURATION: 97 %

## 2017-07-05 DIAGNOSIS — K21.0 GASTRO-ESOPHAGEAL REFLUX DISEASE WITH ESOPHAGITIS: ICD-10-CM

## 2017-07-05 DIAGNOSIS — K44.9 DIAPHRAGMATIC HERNIA WITHOUT OBSTRUCTION OR GANGRENE: ICD-10-CM

## 2017-07-05 DIAGNOSIS — R13.10 DYSPHAGIA, UNSPECIFIED: ICD-10-CM

## 2017-07-05 DIAGNOSIS — K22.2 ESOPHAGEAL OBSTRUCTION: ICD-10-CM

## 2017-07-05 PROCEDURE — 43235 EGD DIAGNOSTIC BRUSH WASH: CPT

## 2017-07-05 PROCEDURE — C1726 CATH, BAL DIL, NON-VASCULAR: HCPCS | Performed by: INTERNAL MEDICINE

## 2017-07-05 PROCEDURE — 43450 DILATE ESOPHAGUS 1/MULT PASS: CPT

## 2017-07-05 PROCEDURE — 25010000002 PROPOFOL 10 MG/ML EMULSION: Performed by: NURSE ANESTHETIST, CERTIFIED REGISTERED

## 2017-07-05 RX ORDER — SODIUM CHLORIDE, SODIUM LACTATE, POTASSIUM CHLORIDE, CALCIUM CHLORIDE 600; 310; 30; 20 MG/100ML; MG/100ML; MG/100ML; MG/100ML
9 INJECTION, SOLUTION INTRAVENOUS CONTINUOUS
Status: DISCONTINUED | OUTPATIENT
Start: 2017-07-05 | End: 2017-07-05 | Stop reason: HOSPADM

## 2017-07-05 RX ORDER — PROPOFOL 10 MG/ML
VIAL (ML) INTRAVENOUS AS NEEDED
Status: DISCONTINUED | OUTPATIENT
Start: 2017-07-05 | End: 2017-07-05 | Stop reason: SURG

## 2017-07-05 RX ORDER — SUCRALFATE 1 G/1
1 TABLET ORAL 4 TIMES DAILY
COMMUNITY
End: 2019-12-13

## 2017-07-05 RX ORDER — LIDOCAINE HYDROCHLORIDE 10 MG/ML
0.5 INJECTION, SOLUTION EPIDURAL; INFILTRATION; INTRACAUDAL; PERINEURAL ONCE AS NEEDED
Status: COMPLETED | OUTPATIENT
Start: 2017-07-05 | End: 2017-07-05

## 2017-07-05 RX ORDER — SODIUM CHLORIDE 0.9 % (FLUSH) 0.9 %
1-10 SYRINGE (ML) INJECTION AS NEEDED
Status: CANCELLED | OUTPATIENT
Start: 2017-07-05

## 2017-07-05 RX ORDER — LIDOCAINE HYDROCHLORIDE 20 MG/ML
INJECTION, SOLUTION INFILTRATION; PERINEURAL AS NEEDED
Status: DISCONTINUED | OUTPATIENT
Start: 2017-07-05 | End: 2017-07-05 | Stop reason: SURG

## 2017-07-05 RX ORDER — ASPIRIN 81 MG/1
81 TABLET, CHEWABLE ORAL DAILY
COMMUNITY

## 2017-07-05 RX ORDER — SODIUM CHLORIDE 0.9 % (FLUSH) 0.9 %
1-10 SYRINGE (ML) INJECTION AS NEEDED
Status: DISCONTINUED | OUTPATIENT
Start: 2017-07-05 | End: 2017-07-05 | Stop reason: HOSPADM

## 2017-07-05 RX ADMIN — LIDOCAINE HYDROCHLORIDE 100 MG: 20 INJECTION, SOLUTION INFILTRATION; PERINEURAL at 13:59

## 2017-07-05 RX ADMIN — SODIUM CHLORIDE, POTASSIUM CHLORIDE, SODIUM LACTATE AND CALCIUM CHLORIDE 9 ML/HR: 600; 310; 30; 20 INJECTION, SOLUTION INTRAVENOUS at 12:30

## 2017-07-05 RX ADMIN — PROPOFOL 30 MG: 10 INJECTION, EMULSION INTRAVENOUS at 14:00

## 2017-07-05 RX ADMIN — PROPOFOL 20 MG: 10 INJECTION, EMULSION INTRAVENOUS at 14:03

## 2017-07-05 RX ADMIN — PROPOFOL 30 MG: 10 INJECTION, EMULSION INTRAVENOUS at 14:07

## 2017-07-05 RX ADMIN — LIDOCAINE HYDROCHLORIDE 0.5 ML: 10 INJECTION, SOLUTION EPIDURAL; INFILTRATION; INTRACAUDAL; PERINEURAL at 12:30

## 2017-07-05 NOTE — PLAN OF CARE
Problem: GI Endoscopy (Adult)  Goal: Signs and Symptoms of Listed Potential Problems Will be Absent or Manageable (GI Endoscopy)  Outcome: Ongoing (interventions implemented as appropriate)    07/05/17 1326   GI Endoscopy   Problems Assessed (GI Endoscopy) all   Problems Present (GI Endoscopy) none

## 2017-07-05 NOTE — H&P
Patient Care Team:  Laci Freedman MD as PCP - General (Family Medicine)    CHIEF COMPLAINT: Dysphagia    HISTORY OF PRESENT ILLNESS:    Solids>> liquids  No meat impactions but can regurgitate food up.  No weight loss. Long standing issues just not better.       Past Medical History:   Diagnosis Date   • Abdominal pain    • Anxiety    • Arthritis    • C. difficile colitis    • Diarrhea    • GERD (gastroesophageal reflux disease)    • Hypertension    • Hypothyroid    • Sinus congestion    • Thrush      Past Surgical History:   Procedure Laterality Date   • ABDOMINAL SURGERY     • APPENDECTOMY     • HYSTERECTOMY     • KNEE ARTHROSCOPY Right    • THYROIDECTOMY       Family History   Problem Relation Age of Onset   • Heart disease Father    • Cancer Sister    • Cancer Brother      Social History   Substance Use Topics   • Smoking status: Never Smoker   • Smokeless tobacco: Never Used   • Alcohol use No     Prescriptions Prior to Admission   Medication Sig Dispense Refill Last Dose   • aspirin 81 MG chewable tablet Chew 81 mg Daily.   7/1/2017   • Calcium Carbonate-Vitamin D (CALCIUM 500 + D PO) Take 1 tablet by mouth 2 (Two) Times a Day.   7/4/2017   • clotrimazole-betamethasone (LOTRISONE) 1-0.05 % cream Apply 1 application topically 2 (Two) Times a Day As Needed (HEMMROIDS).   7/4/2017   • dicyclomine (BENTYL) 10 MG capsule Take 10 mg by mouth 4 (Four) Times a Day Before Meals & at Bedtime As Needed (ABDOMINAL PAIN).   Past Week at Unknown time   • diltiaZEM (CARDIZEM) 30 MG tablet Take 30 mg by mouth 2 (Two) Times a Day.   7/5/2017 at 800   • fluticasone (FLONASE) 50 MCG/ACT nasal spray 2 sprays into each nostril Daily.   7/4/2017 at [id number] time   • levocetirizine (XYZAL) 5 MG tablet Take 5 mg by mouth Every Evening.   7/4/2017   • levothyroxine (SYNTHROID) 50 MCG tablet Take 50 mcg by mouth Daily.   7/5/2017 at 800   • LORazepam (ATIVAN) 1 MG tablet Take 1 mg by mouth 2 (Two) Times a Day.   7/5/2017 at 800   •  "Multiple Vitamins-Minerals (CENTRUM SILVER PO) Take 1 tablet by mouth Daily.   7/3/2017   • pantoprazole (PROTONIX) 40 MG EC tablet Take 40 mg by mouth Daily.   7/4/2017 at 800   • Potassium Chloride (KLOR-CON 10 PO) Take 10 mEq by mouth Daily.   7/4/2017   • sucralfate (CARAFATE) 1 G tablet Take 1 g by mouth 4 (Four) Times a Day.   7/4/2017 at 2100   • benzonatate (TESSALON) 100 MG capsule Take 100 mg by mouth 3 (Three) Times a Day As Needed for Cough.   More than a month at Unknown time   • guaiFENesin (MUCINEX) 600 MG 12 hr tablet Take 600 mg by mouth Every 12 (Twelve) Hours As Needed for Cough.   More than a month at Unknown time   • lactobacillus acidophilus (RISAQUAD) capsule capsule Take 1 capsule by mouth Daily. 30 capsule 0 Unknown at Unknown time   • meclizine (ANTIVERT) 12.5 MG tablet Take 12.5 mg by mouth 3 (Three) Times a Day As Needed for dizziness.   More than a month at Unknown time     Allergies:  Biaxin [clarithromycin]; Ceftin [cefuroxime]; Celexa [citalopram]; Chlorpheniramine; Ciprofloxacin; Crestor [rosuvastatin]; Doxycycline; Effexor [venlafaxine]; Fluoxetine; Hydrocodone bitartrate er; Imipramine hcl; Levaquin [levofloxacin]; Lexapro [escitalopram oxalate]; Macrobid [nitrofurantoin monohyd macro]; Methscopolamine; Phenylpropanolamine; Prednisone; Promethazine; Simvastatin; Verapamil; Welchol [colesevelam hcl]; Zetia [ezetimibe]; Zoloft [sertraline hcl]; and Penicillins    REVIEW OF SYSTEMS:  Please see the above history of present illness for pertinent positives and negatives.  The remainder of the patient's systems have been reviewed and are negative.     Vital Signs  Temp:  [98.6 °F (37 °C)] 98.6 °F (37 °C)  Heart Rate:  [65] 65  Resp:  [16] 16  BP: (174)/(64) 174/64    Flowsheet Rows         First Filed Value    Admission Height  64\" (162.6 cm) Documented at 07/03/2017 1506    Admission Weight  138 lb (62.6 kg) Documented at 07/03/2017 1506           Physical Exam:  Physical Exam "   Constitutional: Patient appears well-developed and well-nourished and in no acute distress   HEENT:   Head: Normocephalic and atraumatic.   Eyes:  Pupils are equal, round, and reactive to light. EOM are intact. Sclera are anicteric and non-injected.  Mouth and Throat: Patient has moist mucous membranes. Oropharynx is clear of any erythema or exudate.     Neck: Neck supple. No JVD present. No thyromegaly present. No lymphadenopathy present.  Cardiovascular: Regular rate, regular rhythm, S1 normal and S2 normal.  Exam reveals no gallop and no friction rub.  No murmur heard.  Pulmonary/Chest: Lungs are clear to auscultation bilaterally. No respiratory distress. No wheezes. No rhonchi. No rales.   Abdominal: Soft. Bowel sounds are normal. No distension and no mass. There is no hepatosplenomegaly. There is no tenderness.   Musculoskeletal: Normal Muscle tone  Extremities: No edema. Pulses are palpable in all 4 extremities.  Neurological: Patient is alert and oriented to person, place, and time. Cranial nerves II-XII are grossly intact with no focal deficits.  Skin: Skin is warm. No rash noted. Nails show no clubbing.  No cyanosis or erythema.     Results Review:    I reviewed the patient's new clinical results.  Lab Results (most recent)     None          Imaging Results (most recent)     None        reviewed    ECG/EMG Results (most recent)     None        reviewed    Assessment/Plan     Dysphagia/ EGD poss Dilation    I discussed the patients findings and my recommendations with patient.     Betito Montejo MD  07/05/17  2:15 PM    Time: 10 min prior to procedure.

## 2017-07-05 NOTE — BRIEF OP NOTE
ESOPHAGOGASTRODUODENOSCOPY  Procedure Note    Paulo Hampton  7/5/2017    Pre-op Diagnosis:   R13.10    Post-op Diagnosis:     Post-Op Diagnosis Codes:     * Reflux esophagitis [K21.0]     * Hiatal hernia [K44.9]     * Esophageal ring [K22.2]    Procedure/CPT® Codes:      Procedure(s):  ESOPHAGOGASTRODUODENOSCOPY WITH DILATION    Surgeon(s):  Betito Montejo MD    Anesthesia: Monitor Anesthesia Care    Staff:   Circulator: Nessa Galvez RN; Carmencita Pinzon RN  Scrub Person: Laura Drake; Darin Espinosa    Estimated Blood Loss: *No blood loss documented*  Urine Voided: * No values recorded between 7/5/2017  1:56 PM and 7/5/2017  2:16 PM *    Specimens:                * No specimens in log *      Drains:           Findings: Normal Duodenum  Normal Stomach  Hiatal hernia (small)   Well controlled reflux esophagitis  Non-Stricturing ring - attempted dilation w 18mm No effect on post dilation exam    Complications: none    Recommendation:  Continue supportive care for Presbyesophagus frewquent sips and easily chewed foods along with liquid supplements      Betito Montejo MD     Date: 7/5/2017  Time: 2:18 PM

## 2017-07-05 NOTE — ANESTHESIA POSTPROCEDURE EVALUATION
Patient: Paulo Hampton    Procedure Summary     Date Anesthesia Start Anesthesia Stop Room / Location    07/05/17 1357 1414 [facility] / [facility]       Procedure Diagnosis Surgeon Provider    ESOPHAGOGASTRODUODENOSCOPY WITH DILATION (N/A Esophagus) Reflux esophagitis; Hiatal hernia; Esophageal ring  (R13.10) MD Niecy La CRNA          Anesthesia Type: MAC  Last vitals  BP      Temp 97.6 °F (36.4 °C) (07/05/17 1422)    Pulse     Resp      SpO2        Post Anesthesia Care and Evaluation    Patient location during evaluation: bedside  Patient participation: complete - patient participated  Level of consciousness: awake and alert  Pain management: adequate  Airway patency: patent  Anesthetic complications: No anesthetic complications  PONV Status: none  Cardiovascular status: acceptable  Respiratory status: acceptable  Hydration status: acceptable

## 2017-07-05 NOTE — PLAN OF CARE
Problem: Patient Care Overview (Adult)  Goal: Plan of Care Review  Outcome: Ongoing (interventions implemented as appropriate)    07/05/17 1246   Coping/Psychosocial Response Interventions   Plan Of Care Reviewed With patient   Patient Care Overview   Progress no change   Outcome Evaluation   Outcome Summary/Follow up Plan stable       Goal: Adult Individualization and Mutuality  Outcome: Ongoing (interventions implemented as appropriate)  Goal: Discharge Needs Assessment  Outcome: Ongoing (interventions implemented as appropriate)    Problem: GI Endoscopy (Adult)  Goal: Signs and Symptoms of Listed Potential Problems Will be Absent or Manageable (GI Endoscopy)  Outcome: Ongoing (interventions implemented as appropriate)

## 2017-07-05 NOTE — PLAN OF CARE
Problem: Patient Care Overview (Adult)  Goal: Adult Individualization and Mutuality  Outcome: Outcome(s) achieved Date Met:  07/05/17 07/05/17 1246   Individualization   Patient Specific Preferences goes by marcos

## 2017-07-05 NOTE — PLAN OF CARE
Problem: GI Endoscopy (Adult)  Goal: Signs and Symptoms of Listed Potential Problems Will be Absent or Manageable (GI Endoscopy)  Outcome: Outcome(s) achieved Date Met:  07/05/17 07/05/17 1326 07/05/17 1423   GI Endoscopy   Problems Assessed (GI Endoscopy) all --    Problems Present (GI Endoscopy) --  none

## 2017-07-05 NOTE — ANESTHESIA PREPROCEDURE EVALUATION
Anesthesia Evaluation     Patient summary reviewed and Nursing notes reviewed   no history of anesthetic complications:  NPO Solid Status: > 8 hours  NPO Liquid Status: > 8 hours     Airway   Mallampati: II  TM distance: >3 FB  Neck ROM: full  no difficulty expected  Dental      Pulmonary - negative pulmonary ROS    breath sounds clear to auscultation  Cardiovascular   Exercise tolerance: good (4-7 METS)    Rhythm: regular  Rate: normal    (+) hypertension well controlled, hyperlipidemia      Neuro/Psych  (+) psychiatric history Anxiety,    GI/Hepatic/Renal/Endo    (+)  GERD poorly controlled, hypothyroidism,     ROS Comment: C. difficile colitis    Musculoskeletal     Abdominal    Substance History - negative use     OB/GYN          Other   (+) arthritis (bob knees)                                     Anesthesia Plan    ASA 2     MAC     intravenous induction   Anesthetic plan and risks discussed with patient.  Use of blood products discussed with patient  Consented to blood products.

## 2017-07-05 NOTE — OP NOTE
Op Note  Date of Service: 7/5/2017 2:18 PM  Betito Montejo MD   Gastroenterology        ESOPHAGOGASTRODUODENOSCOPY Procedure Note     Paulo Hampton  7/5/2017     Pre-op Diagnosis:   R13.10     Post-op Diagnosis:   Post-Op Diagnosis Codes:  * Reflux esophagitis [K21.0]  * Hiatal hernia [K44.9]  * Esophageal ring [K22.2]     Procedure/CPT® Codes:        Procedure(s):  ESOPHAGOGASTRODUODENOSCOPY WITH DILATION     Surgeon(s):  Betito Montejo MD     Anesthesia: Monitor Anesthesia Care     Staff:   Circulator: Nessa Galvez RN; Carmencita Pinzon RN  Scrub Person: Laura Drake; Darin Espinosa     Estimated Blood Loss:  None  Urine Voided: * No values recorded between 7/5/2017 1:56 PM and 7/5/2017 2:16 PM *     Specimens:   * No specimens in log *          Findings: Normal Duodenum  Normal Stomach  Hiatal hernia (small)   Well controlled reflux esophagitis  Non-Stricturing ring - attempted dilation w 18mm No effect on post dilation exam     Complications: none     Recommendation: Continue supportive care for Presbyesophagus frewquent sips and easily chewed foods along with liquid supplements        Betito Montejo MD      Date: 7/5/2017 Time: 2:18 PM

## 2017-07-05 NOTE — PLAN OF CARE
Problem: Patient Care Overview (Adult)  Goal: Plan of Care Review  Outcome: Outcome(s) achieved Date Met:  07/05/17 07/05/17 1246 07/05/17 1423   Coping/Psychosocial Response Interventions   Plan Of Care Reviewed With patient --    Patient Care Overview   Progress --  improving   Outcome Evaluation   Outcome Summary/Follow up Plan --  vss, waiting to go home

## 2017-07-09 ENCOUNTER — APPOINTMENT (OUTPATIENT)
Dept: GENERAL RADIOLOGY | Facility: HOSPITAL | Age: 82
End: 2017-07-09

## 2017-07-09 ENCOUNTER — APPOINTMENT (OUTPATIENT)
Dept: CT IMAGING | Facility: HOSPITAL | Age: 82
End: 2017-07-09

## 2017-07-09 ENCOUNTER — HOSPITAL ENCOUNTER (EMERGENCY)
Facility: HOSPITAL | Age: 82
Discharge: HOME | End: 2017-07-09
Attending: EMERGENCY MEDICINE | Admitting: EMERGENCY MEDICINE

## 2017-07-09 VITALS
WEIGHT: 139 LBS | DIASTOLIC BLOOD PRESSURE: 73 MMHG | HEART RATE: 67 BPM | TEMPERATURE: 97.7 F | SYSTOLIC BLOOD PRESSURE: 207 MMHG | OXYGEN SATURATION: 97 % | HEIGHT: 63 IN | BODY MASS INDEX: 24.63 KG/M2 | RESPIRATION RATE: 16 BRPM

## 2017-07-09 DIAGNOSIS — I10 HYPERTENSION, UNCONTROLLED: Primary | ICD-10-CM

## 2017-07-09 DIAGNOSIS — R07.89 CHEST PAIN, ATYPICAL: ICD-10-CM

## 2017-07-09 LAB
ALBUMIN SERPL-MCNC: 4 G/DL (ref 3.5–5.2)
ALBUMIN/GLOB SERPL: 1.4 G/DL
ALP SERPL-CCNC: 74 U/L (ref 40–129)
ALT SERPL W P-5'-P-CCNC: 20 U/L (ref 5–33)
ANION GAP SERPL CALCULATED.3IONS-SCNC: 15.2 MMOL/L
AST SERPL-CCNC: 24 U/L (ref 5–32)
BACTERIA UR QL AUTO: ABNORMAL /HPF
BASOPHILS # BLD AUTO: 0.02 10*3/MM3 (ref 0–0.2)
BASOPHILS NFR BLD AUTO: 0.3 % (ref 0–2)
BILIRUB SERPL-MCNC: 0.4 MG/DL (ref 0.2–1.2)
BILIRUB UR QL STRIP: NEGATIVE
BUN BLD-MCNC: 21 MG/DL (ref 8–23)
BUN/CREAT SERPL: 21.9 (ref 7–25)
CALCIUM SPEC-SCNC: 9 MG/DL (ref 8.8–10.5)
CHLORIDE SERPL-SCNC: 102 MMOL/L (ref 98–107)
CLARITY UR: ABNORMAL
CO2 SERPL-SCNC: 23.8 MMOL/L (ref 22–29)
COLOR UR: YELLOW
CREAT BLD-MCNC: 0.96 MG/DL (ref 0.57–1)
DEPRECATED RDW RBC AUTO: 42.1 FL (ref 37–54)
EOSINOPHIL # BLD AUTO: 0.1 10*3/MM3 (ref 0.1–0.3)
EOSINOPHIL NFR BLD AUTO: 1.3 % (ref 0–4)
ERYTHROCYTE [DISTWIDTH] IN BLOOD BY AUTOMATED COUNT: 12.9 % (ref 11.5–14.5)
GFR SERPL CREATININE-BSD FRML MDRD: 55 ML/MIN/1.73
GLOBULIN UR ELPH-MCNC: 2.9 GM/DL
GLUCOSE BLD-MCNC: 103 MG/DL (ref 65–99)
GLUCOSE UR STRIP-MCNC: NEGATIVE MG/DL
HCT VFR BLD AUTO: 39.8 % (ref 37–47)
HGB BLD-MCNC: 13.4 G/DL (ref 12–16)
HGB UR QL STRIP.AUTO: ABNORMAL
HYALINE CASTS UR QL AUTO: ABNORMAL /LPF
IMM GRANULOCYTES # BLD: 0.01 10*3/MM3 (ref 0–0.03)
IMM GRANULOCYTES NFR BLD: 0.1 % (ref 0–0.5)
KETONES UR QL STRIP: NEGATIVE
LEUKOCYTE ESTERASE UR QL STRIP.AUTO: ABNORMAL
LYMPHOCYTES # BLD AUTO: 3.5 10*3/MM3 (ref 0.6–4.8)
LYMPHOCYTES NFR BLD AUTO: 44.9 % (ref 20–45)
MCH RBC QN AUTO: 29.9 PG (ref 27–31)
MCHC RBC AUTO-ENTMCNC: 33.7 G/DL (ref 31–37)
MCV RBC AUTO: 88.8 FL (ref 81–99)
MONOCYTES # BLD AUTO: 0.46 10*3/MM3 (ref 0–1)
MONOCYTES NFR BLD AUTO: 5.9 % (ref 3–8)
NEUTROPHILS # BLD AUTO: 3.7 10*3/MM3 (ref 1.5–8.3)
NEUTROPHILS NFR BLD AUTO: 47.5 % (ref 45–70)
NITRITE UR QL STRIP: NEGATIVE
NRBC BLD MANUAL-RTO: 0 /100 WBC (ref 0–0)
PH UR STRIP.AUTO: 6 [PH] (ref 4.5–8)
PLATELET # BLD AUTO: 238 10*3/MM3 (ref 140–500)
PMV BLD AUTO: 11 FL (ref 7.4–10.4)
POTASSIUM BLD-SCNC: 4 MMOL/L (ref 3.5–5.2)
PROT SERPL-MCNC: 6.9 G/DL (ref 6–8.5)
PROT UR QL STRIP: NEGATIVE
RBC # BLD AUTO: 4.48 10*6/MM3 (ref 4.2–5.4)
RBC # UR: ABNORMAL /HPF
REF LAB TEST METHOD: ABNORMAL
SODIUM BLD-SCNC: 141 MMOL/L (ref 136–145)
SP GR UR STRIP: 1.01 (ref 1–1.03)
SQUAMOUS #/AREA URNS HPF: ABNORMAL /HPF
TROPONIN T SERPL-MCNC: <0.01 NG/ML (ref 0–0.03)
UROBILINOGEN UR QL STRIP: ABNORMAL
WBC NRBC COR # BLD: 7.79 10*3/MM3 (ref 4.8–10.8)
WBC UR QL AUTO: ABNORMAL /HPF

## 2017-07-09 PROCEDURE — 93005 ELECTROCARDIOGRAM TRACING: CPT | Performed by: EMERGENCY MEDICINE

## 2017-07-09 PROCEDURE — 36415 COLL VENOUS BLD VENIPUNCTURE: CPT

## 2017-07-09 PROCEDURE — 99284 EMERGENCY DEPT VISIT MOD MDM: CPT | Performed by: EMERGENCY MEDICINE

## 2017-07-09 PROCEDURE — 84484 ASSAY OF TROPONIN QUANT: CPT | Performed by: EMERGENCY MEDICINE

## 2017-07-09 PROCEDURE — 80053 COMPREHEN METABOLIC PANEL: CPT | Performed by: EMERGENCY MEDICINE

## 2017-07-09 PROCEDURE — 81001 URINALYSIS AUTO W/SCOPE: CPT | Performed by: EMERGENCY MEDICINE

## 2017-07-09 PROCEDURE — 93010 ELECTROCARDIOGRAM REPORT: CPT | Performed by: INTERNAL MEDICINE

## 2017-07-09 PROCEDURE — 99284 EMERGENCY DEPT VISIT MOD MDM: CPT

## 2017-07-09 PROCEDURE — 85025 COMPLETE CBC W/AUTO DIFF WBC: CPT | Performed by: EMERGENCY MEDICINE

## 2017-07-09 PROCEDURE — 71020 HC CHEST PA AND LATERAL: CPT

## 2017-07-09 PROCEDURE — 70450 CT HEAD/BRAIN W/O DYE: CPT

## 2017-07-09 RX ORDER — LISINOPRIL 10 MG/1
10 TABLET ORAL ONCE
Status: COMPLETED | OUTPATIENT
Start: 2017-07-09 | End: 2017-07-09

## 2017-07-09 RX ORDER — SODIUM CHLORIDE 0.9 % (FLUSH) 0.9 %
10 SYRINGE (ML) INJECTION AS NEEDED
Status: DISCONTINUED | OUTPATIENT
Start: 2017-07-09 | End: 2017-07-09 | Stop reason: HOSPADM

## 2017-07-09 RX ORDER — LISINOPRIL 20 MG/1
20 TABLET ORAL DAILY
Qty: 30 TABLET | Refills: 0 | Status: SHIPPED | OUTPATIENT
Start: 2017-07-09

## 2017-07-09 RX ADMIN — LISINOPRIL 10 MG: 10 TABLET ORAL at 11:13

## 2017-07-09 NOTE — ED PROVIDER NOTES
"Subjective   Patient is a 87 y.o. female presenting with chest pain.   History provided by:  Patient  Chest Pain   Pain location:  Substernal area  Pain quality: tightness    Radiates to: right-sided facial pain likewise resolved.  Pain severity:  No pain  Progression:  Resolved  Chronicity:  New  Context comment:  Patient noted chest tightness on awakening this morning  Relieved by: taking morning medications.  Associated symptoms: anxiety and nausea    Associated symptoms: no abdominal pain, no altered mental status, no anorexia, no back pain, no claudication, no cough, no diaphoresis, no dizziness, no dysphagia, no fever, no headache, no heartburn, no lower extremity edema, no near-syncope, no palpitations, no shortness of breath, no syncope, no vomiting and no weakness    Risk factors: hypertension    Risk factors: no aortic disease, no coronary artery disease, no diabetes mellitus, no high cholesterol, not obese and no smoking      HPI Narrative:Ms. Hampton is a 86 yo WF who presents secondary to several vague complaints.  Patient states that she has not \"felt right\" since she underwent EGD on 7/5/17. The EGD was performed by Dr. BRADLEY Montejo.  Patient underwent esophageal dilation.  Patient reports that she has felt a tightness in her chest as well as her nausea this morning.  This sensation subsided after taking her morning medications.  Patient also experienced pain in the right side of her face.  This is mild in resolved spontaneously.  Denies any chest pain, face pain or chest tightness at this time.  Patient presents for evaluation.        Review of Systems   Constitutional: Negative.  Negative for appetite change, diaphoresis and fever.   HENT: Negative.  Negative for trouble swallowing.    Eyes: Negative.    Respiratory: Negative for cough, chest tightness, shortness of breath and wheezing.    Cardiovascular: Positive for chest pain. Negative for palpitations, claudication, leg swelling, syncope and " near-syncope.   Gastrointestinal: Positive for nausea. Negative for abdominal pain, anorexia, heartburn and vomiting.   Genitourinary: Negative.  Negative for difficulty urinating, flank pain, frequency and hematuria.   Musculoskeletal: Negative.  Negative for back pain.   Skin: Negative.  Negative for color change, pallor and rash.   Neurological: Negative.  Negative for dizziness, seizures, syncope, weakness and headaches.   Psychiatric/Behavioral: Negative.  Negative for agitation, behavioral problems and hallucinations.       Past Medical History:   Diagnosis Date   • Abdominal pain    • Anxiety    • Arthritis    • Atrial fibrillation    • C. difficile colitis    • Diarrhea    • GERD (gastroesophageal reflux disease)    • Hypertension    • Hypothyroid    • Presbyesophagus    • Sinus congestion    • Thrush        Allergies   Allergen Reactions   • Biaxin [Clarithromycin] Nausea And Vomiting   • Ceftin [Cefuroxime]      Uncertain if true allergy   • Celexa [Citalopram] Dizziness   • Chlorpheniramine Other (See Comments)     tachycardia   • Ciprofloxacin      Uncertain if true allergy   • Crestor [Rosuvastatin] Other (See Comments)     Elevated LFTs   • Doxycycline Nausea And Vomiting   • Effexor [Venlafaxine] Nausea Only   • Fluoxetine Other (See Comments)     Abdominal pain   • Hydrocodone Bitartrate Er      insomnia   • Imipramine Hcl      Uncertain if true allergy   • Levaquin [Levofloxacin] Diarrhea   • Lexapro [Escitalopram Oxalate] Nausea Only   • Macrobid [Nitrofurantoin Monohyd Macro]      Uncertain if true allergy   • Methscopolamine      Uncertain if true allergy   • Phenylpropanolamine      Uncertain if true allergy   • Prednisone      Uncertain if true allergy   • Promethazine      Uncertain if true allergy   • Simvastatin      Uncertain if true allergy     • Verapamil      Uncertain if true allergy   • Welchol [Colesevelam Hcl]      Uncertain if true allergy   • Zetia [Ezetimibe]      Uncertain if  true allergy     • Zoloft [Sertraline Hcl] Nausea Only   • Penicillins Rash       Past Surgical History:   Procedure Laterality Date   • ABDOMINAL SURGERY     • APPENDECTOMY     • ENDOSCOPY N/A 7/5/2017    Procedure: ESOPHAGOGASTRODUODENOSCOPY WITH DILATION;  Surgeon: Betito Montejo MD;  Location: [facility];  Service:    • HYSTERECTOMY     • KNEE ARTHROSCOPY Right    • THYROIDECTOMY         Family History   Problem Relation Age of Onset   • Heart disease Father    • Cancer Sister    • Cancer Brother        Social History     Social History   • Marital status: Unknown     Spouse name: N/A   • Number of children: N/A   • Years of education: N/A     Social History Main Topics   • Smoking status: Never Smoker   • Smokeless tobacco: Never Used   • Alcohol use No   • Drug use: No   • Sexual activity: Defer     Other Topics Concern   • None     Social History Narrative   • None           Objective   Physical Exam   Constitutional: She is oriented to person, place, and time. She appears well-developed and well-nourished. No distress.   87-year-old white female laying in bed.  She appears younger than stated age.  She appears in good overall health for age.  She is slightly anxious.   HENT:   Head: Normocephalic and atraumatic.   Right Ear: External ear normal.   Left Ear: External ear normal.   Nose: Nose normal.   Mouth/Throat: Oropharynx is clear and moist.   Eyes: Conjunctivae and EOM are normal. Pupils are equal, round, and reactive to light.   Neck: Normal range of motion. Neck supple.   Cardiovascular: Normal rate, regular rhythm, normal heart sounds and intact distal pulses.  Exam reveals no gallop and no friction rub.    No murmur heard.  Pulmonary/Chest: Effort normal and breath sounds normal.   Abdominal: Soft. Bowel sounds are normal. She exhibits no distension. There is no tenderness.   Musculoskeletal: Normal range of motion.   Neurological: She is alert and oriented to person, place, and time.    Skin: Skin is warm and dry. She is not diaphoretic.   Psychiatric: Her behavior is normal. Her mood appears anxious.   Nursing note and vitals reviewed.      ECG 12 Lead    Date/Time: 7/9/2017 11:13 AM  Performed by: MONISHA TORRES  Authorized by: MONISHA TORRES   Interpreted by physician  Previous ECG: no previous ECG available  Rhythm: sinus rhythm  Rhythm comments: with arrhythmia  Rate: normal  QRS axis: left  Conduction: conduction normal  ST Segments: ST segments normal  T Waves: T waves normal  Other findings: LVH  Other findings comments: borderline  Clinical impression: non-specific ECG               ED Course  ED Course   Comment By Time   07/09/17  8:44 AM  Patient symptoms are very vague.  Her pain was in the epigastrium and lasted less than 5 minutes.  Patient cannot quantify on the pain scale the intensity of her pain.  Nor can she described it  However she is pain-free and has been since arrival at the ER.  Will obtain full set of lab work, EKG and troponin Monisha Torres MD 07/09 0955 07/09/17  9:55 AM  Patient informed the nurse, Bertha Quintero that she is experiencing headaches with a pain behind her right eye shooting through the right side of her head.  This is been a recurring pain for the past 3 days.  Patient did not mention this to me during history and physical.  I will obtain CT head. Monisha Torres MD 07/09 0956 07/09/17  11:05 AM  Awaiting head CT.  Patient's workup otherwise unremarkable.  EKG shows sinus arrhythmia.  Troponin negative.  Chest x-ray borderline cardiomegaly otherwise negative.  Remainder of CBC, CMP and UA are unremarkable.  However patient does have elevated blood pressure.  She has the prior diagnosis of hypertension but is not on any blood pressure medication.  She is on Cardizem which I assume is for rate control.  I will start on lisinopril.  Discussed at length with patient and family all results, diagnosis, treatment and follow-up. Monisha Torres,  MD 07/09 1105      Labs Reviewed   COMPREHENSIVE METABOLIC PANEL - Abnormal; Notable for the following:        Result Value    Glucose 103 (*)     eGFR Non  Amer 55 (*)     All other components within normal limits    Narrative:     The MDRD GFR formula is only valid for adults with stable renal function between ages 18 and 70.   CBC WITH AUTO DIFFERENTIAL - Abnormal; Notable for the following:     MPV 11.0 (*)     All other components within normal limits   URINALYSIS W/ CULTURE IF INDICATED - Abnormal; Notable for the following:     Appearance, UA Cloudy (*)     Blood, UA Trace (*)     Leuk Esterase, UA Trace (*)     All other components within normal limits   URINALYSIS, MICROSCOPIC ONLY - Abnormal; Notable for the following:     RBC, UA 0-2 (*)     WBC, UA 0-2 (*)     Squamous Epithelial Cells, UA 3-6 (*)     All other components within normal limits   TROPONIN (IN-HOUSE) - Normal    Narrative:     Troponin T Reference Ranges:  Less than 0.03 ng/mL:    Negative for AMI  0.03 to 0.09 ng/mL:      Indeterminant for AMI  Greater than 0.09 ng/mL: Positive for AMI   CBC AND DIFFERENTIAL    Narrative:     The following orders were created for panel order CBC & Differential.  Procedure                               Abnormality         Status                     ---------                               -----------         ------                     CBC Auto Differential[[id number]]        Abnormal            Final result                 Please view results for these tests on the individual orders.                 MDM  Number of Diagnoses or Management Options  Chest pain, atypical: new and requires workup  Hypertension, uncontrolled: established and worsening  Diagnosis management comments: Patient is noncompliant with her blood pressure medication.  However patient does not recall ever taking any blood pressure medication.  Based on today's vital signs she certainly has hypertension and needs to be placed on  antihypertensive medication.       Amount and/or Complexity of Data Reviewed  Clinical lab tests: ordered and reviewed  Tests in the radiology section of CPT®: ordered and reviewed  Tests in the medicine section of CPT®: ordered and reviewed  Independent visualization of images, tracings, or specimens: yes    Risk of Complications, Morbidity, and/or Mortality  Presenting problems: moderate  Diagnostic procedures: moderate  Management options: moderate    Patient Progress  Patient progress: improved      Final diagnoses:   Hypertension, uncontrolled   Chest pain, atypical            Rudy Vargas MD  07/09/17 1249

## 2019-09-25 ENCOUNTER — HOSPITAL ENCOUNTER (EMERGENCY)
Facility: HOSPITAL | Age: 84
Discharge: HOME | End: 2019-09-25
Attending: EMERGENCY MEDICINE | Admitting: EMERGENCY MEDICINE

## 2019-09-25 ENCOUNTER — APPOINTMENT (OUTPATIENT)
Dept: CT IMAGING | Facility: HOSPITAL | Age: 84
End: 2019-09-25

## 2019-09-25 VITALS
WEIGHT: 143 LBS | TEMPERATURE: 97.8 F | RESPIRATION RATE: 20 BRPM | HEIGHT: 57 IN | HEART RATE: 74 BPM | BODY MASS INDEX: 30.85 KG/M2 | DIASTOLIC BLOOD PRESSURE: 96 MMHG | SYSTOLIC BLOOD PRESSURE: 177 MMHG | OXYGEN SATURATION: 99 %

## 2019-09-25 DIAGNOSIS — K57.32 SIGMOID DIVERTICULITIS: Primary | ICD-10-CM

## 2019-09-25 DIAGNOSIS — K64.4 EXTERNAL HEMORRHOID, BLEEDING: ICD-10-CM

## 2019-09-25 LAB
ALBUMIN SERPL-MCNC: 4 G/DL (ref 3.5–5.2)
ALBUMIN/GLOB SERPL: 1.5 G/DL
ALP SERPL-CCNC: 67 U/L (ref 39–117)
ALT SERPL W P-5'-P-CCNC: 12 U/L (ref 1–33)
ANION GAP SERPL CALCULATED.3IONS-SCNC: 12 MMOL/L (ref 5–15)
APTT PPP: 34.8 SECONDS (ref 24.3–38.1)
AST SERPL-CCNC: 18 U/L (ref 1–32)
BASOPHILS # BLD AUTO: 0.01 10*3/MM3 (ref 0–0.2)
BASOPHILS NFR BLD AUTO: 0.1 % (ref 0–1.5)
BILIRUB SERPL-MCNC: 0.5 MG/DL (ref 0.2–1.2)
BUN BLD-MCNC: 13 MG/DL (ref 8–23)
BUN/CREAT SERPL: 15.3 (ref 7–25)
CALCIUM SPEC-SCNC: 8.8 MG/DL (ref 8.6–10.5)
CHLORIDE SERPL-SCNC: 106 MMOL/L (ref 98–107)
CO2 SERPL-SCNC: 26 MMOL/L (ref 22–29)
CREAT BLD-MCNC: 0.85 MG/DL (ref 0.57–1)
DEPRECATED RDW RBC AUTO: 43.2 FL (ref 37–54)
EOSINOPHIL # BLD AUTO: 0.08 10*3/MM3 (ref 0–0.4)
EOSINOPHIL NFR BLD AUTO: 1 % (ref 0.3–6.2)
ERYTHROCYTE [DISTWIDTH] IN BLOOD BY AUTOMATED COUNT: 12.1 % (ref 12.3–15.4)
GFR SERPL CREATININE-BSD FRML MDRD: 63 ML/MIN/1.73
GLOBULIN UR ELPH-MCNC: 2.7 GM/DL
GLUCOSE BLD-MCNC: 111 MG/DL (ref 65–99)
HCT VFR BLD AUTO: 39.2 % (ref 34–46.6)
HGB BLD-MCNC: 12.5 G/DL (ref 12–15.9)
IMM GRANULOCYTES # BLD AUTO: 0.01 10*3/MM3 (ref 0–0.05)
IMM GRANULOCYTES NFR BLD AUTO: 0.1 % (ref 0–0.5)
INR PPP: 1.02 (ref 0.9–1.1)
LIPASE SERPL-CCNC: 14 U/L (ref 13–60)
LYMPHOCYTES # BLD AUTO: 2.56 10*3/MM3 (ref 0.7–3.1)
LYMPHOCYTES NFR BLD AUTO: 30.6 % (ref 19.6–45.3)
MCH RBC QN AUTO: 30.5 PG (ref 26.6–33)
MCHC RBC AUTO-ENTMCNC: 31.9 G/DL (ref 31.5–35.7)
MCV RBC AUTO: 95.6 FL (ref 79–97)
MONOCYTES # BLD AUTO: 0.44 10*3/MM3 (ref 0.1–0.9)
MONOCYTES NFR BLD AUTO: 5.3 % (ref 5–12)
NEUTROPHILS # BLD AUTO: 5.27 10*3/MM3 (ref 1.7–7)
NEUTROPHILS NFR BLD AUTO: 62.9 % (ref 42.7–76)
PLATELET # BLD AUTO: 197 10*3/MM3 (ref 140–450)
PMV BLD AUTO: 11.5 FL (ref 6–12)
POTASSIUM BLD-SCNC: 4.3 MMOL/L (ref 3.5–5.2)
PROT SERPL-MCNC: 6.7 G/DL (ref 6–8.5)
PROTHROMBIN TIME: 13.1 SECONDS (ref 12.1–15)
RBC # BLD AUTO: 4.1 10*6/MM3 (ref 3.77–5.28)
SODIUM BLD-SCNC: 144 MMOL/L (ref 136–145)
WBC NRBC COR # BLD: 8.37 10*3/MM3 (ref 3.4–10.8)

## 2019-09-25 PROCEDURE — 85610 PROTHROMBIN TIME: CPT | Performed by: EMERGENCY MEDICINE

## 2019-09-25 PROCEDURE — 83690 ASSAY OF LIPASE: CPT | Performed by: EMERGENCY MEDICINE

## 2019-09-25 PROCEDURE — 85025 COMPLETE CBC W/AUTO DIFF WBC: CPT | Performed by: EMERGENCY MEDICINE

## 2019-09-25 PROCEDURE — 85730 THROMBOPLASTIN TIME PARTIAL: CPT | Performed by: EMERGENCY MEDICINE

## 2019-09-25 PROCEDURE — 80053 COMPREHEN METABOLIC PANEL: CPT | Performed by: EMERGENCY MEDICINE

## 2019-09-25 PROCEDURE — 0 IOPAMIDOL PER 1 ML: Performed by: EMERGENCY MEDICINE

## 2019-09-25 PROCEDURE — 74177 CT ABD & PELVIS W/CONTRAST: CPT

## 2019-09-25 PROCEDURE — 99284 EMERGENCY DEPT VISIT MOD MDM: CPT

## 2019-09-25 PROCEDURE — 99284 EMERGENCY DEPT VISIT MOD MDM: CPT | Performed by: EMERGENCY MEDICINE

## 2019-09-25 RX ORDER — ACETAMINOPHEN 500 MG
1000 TABLET ORAL ONCE
Status: COMPLETED | OUTPATIENT
Start: 2019-09-25 | End: 2019-09-25

## 2019-09-25 RX ORDER — METRONIDAZOLE 500 MG/1
500 TABLET ORAL 3 TIMES DAILY
Qty: 21 TABLET | Refills: 0 | Status: SHIPPED | OUTPATIENT
Start: 2019-09-25 | End: 2019-10-02

## 2019-09-25 RX ORDER — SIMETHICONE 125 MG
125 TABLET,CHEWABLE ORAL EVERY 6 HOURS PRN
COMMUNITY

## 2019-09-25 RX ORDER — POTASSIUM CHLORIDE 20 MEQ/1
20 TABLET, EXTENDED RELEASE ORAL 2 TIMES DAILY
COMMUNITY
End: 2019-12-13

## 2019-09-25 RX ORDER — RANITIDINE 150 MG/1
150 TABLET ORAL 2 TIMES DAILY
COMMUNITY
End: 2020-04-30

## 2019-09-25 RX ORDER — SODIUM CHLORIDE 0.9 % (FLUSH) 0.9 %
10 SYRINGE (ML) INJECTION AS NEEDED
Status: DISCONTINUED | OUTPATIENT
Start: 2019-09-25 | End: 2019-09-25 | Stop reason: HOSPADM

## 2019-09-25 RX ADMIN — ACETAMINOPHEN 1000 MG: 500 TABLET, FILM COATED ORAL at 10:11

## 2019-09-25 RX ADMIN — SILVER NITRATE APPLICATORS 1 APPLICATION: 25; 75 STICK TOPICAL at 08:48

## 2019-09-25 RX ADMIN — SODIUM CHLORIDE 500 ML: 9 INJECTION, SOLUTION INTRAVENOUS at 09:50

## 2019-09-25 RX ADMIN — IOPAMIDOL 100 ML: 755 INJECTION, SOLUTION INTRAVENOUS at 11:16

## 2019-09-25 NOTE — ED PROVIDER NOTES
EMERGENCY DEPARTMENT ENCOUNTER    Room Number:  1B/1B  Date seen:  9/25/2019  Time seen: 9:05 AM  PCP: Laci Freedman MD  Historian: Patient and her family      HPI:  Chief Complaint: Abdominal pain, rectal bleeding  A complete HPI/ROS/PMH/PSH/SH/FH are unobtainable due to: Nothing  Context: Paulo Hampton is a 89 y.o. female who presents to the ED c/o mild abdominal pain for the last 4 days with occasional blood when wiping after bowel movement.  She then developed bright red blood per rectum today.  She reports that it was dripping in the toilet when she was sitting down.  She also has had mild diffuse crampy abdominal pain.  Mild nausea without vomiting.  She denies fever or chills.  She has had some intermittent loose stool over the last several days but denies significant watery diarrhea.  She does have a history of atrial fibrillation but takes only baby aspirin, no other antiplatelet agents or anticoagulants.  She does have a history of hemorrhoids and also a history of C. difficile colitis.            PAST MEDICAL HISTORY  Active Ambulatory Problems     Diagnosis Date Noted   • Acute lower GI bleeding 04/05/2017   • Lower GI bleed 04/06/2017     Resolved Ambulatory Problems     Diagnosis Date Noted   • No Resolved Ambulatory Problems     Past Medical History:   Diagnosis Date   • Abdominal pain    • Anxiety    • Arthritis    • Atrial fibrillation (CMS/HCC)    • C. difficile colitis    • Diarrhea    • GERD (gastroesophageal reflux disease)    • Hyperlipidemia    • Hypertension    • Hypothyroid    • Presbyesophagus    • Sinus congestion    • Thrush          PAST SURGICAL HISTORY  Past Surgical History:   Procedure Laterality Date   • ABDOMINAL SURGERY     • APPENDECTOMY     • CHOLECYSTECTOMY     • ENDOSCOPY N/A 7/5/2017    Procedure: ESOPHAGOGASTRODUODENOSCOPY WITH DILATION;  Surgeon: Betito Montejo MD;  Location: [facility];  Service:    • HYSTERECTOMY     • KNEE ARTHROSCOPY Right    •  THYROIDECTOMY           FAMILY HISTORY  Family History   Problem Relation Age of Onset   • Heart disease Father    • Cancer Sister    • Cancer Brother          SOCIAL HISTORY  Social History     Socioeconomic History   • Marital status: Unknown     Spouse name: Not on file   • Number of children: Not on file   • Years of education: Not on file   • Highest education level: Not on file   Tobacco Use   • Smoking status: Never Smoker   • Smokeless tobacco: Never Used   Substance and Sexual Activity   • Alcohol use: No   • Drug use: No   • Sexual activity: Defer         ALLERGIES  Biaxin [clarithromycin]; Ceftin [cefuroxime]; Celexa [citalopram]; Chlorpheniramine; Ciprofloxacin; Crestor [rosuvastatin]; Doxycycline; Effexor [venlafaxine]; Fluoxetine; Hydrocodone bitartrate; Imipramine hcl; Levaquin [levofloxacin]; Lexapro [escitalopram oxalate]; Macrobid [nitrofurantoin monohyd macro]; Methscopolamine; Phenylpropanolamine; Prednisone; Promethazine; Simvastatin; Verapamil; Welchol [colesevelam hcl]; Zetia [ezetimibe]; Zoloft [sertraline hcl]; and Penicillins        REVIEW OF SYSTEMS  Review of Systems   Constitutional: Negative for diaphoresis and fever.   HENT: Negative for congestion.    Eyes: Negative for visual disturbance.   Respiratory: Negative for shortness of breath.    Cardiovascular: Negative for palpitations.   Gastrointestinal: Positive for abdominal pain, blood in stool and nausea. Negative for vomiting.   Endocrine: Negative for polyuria.   Genitourinary: Negative for flank pain.   Musculoskeletal: Negative for joint swelling.   Skin: Negative for wound.   Neurological: Negative for seizures.   Hematological: Negative for adenopathy.   Psychiatric/Behavioral: Negative for sleep disturbance.            PHYSICAL EXAM  ED Triage Vitals   Temp Heart Rate Resp BP SpO2   09/25/19 0850 09/25/19 0850 09/25/19 0857 09/25/19 0850 09/25/19 0850   97.8 °F (36.6 °C) 67 20 (!) 203/93 97 %      Temp src Heart Rate Source  Patient Position BP Location FiO2 (%)   09/25/19 0850 09/25/19 0850 09/25/19 0850 09/25/19 0850 --   Oral Monitor Lying Right arm          GENERAL: Awake alert, pleasant, no acute distress  HENT: nares patent  EYES: no scleral icterus  CV: irregular rhythm, normal rate  RESPIRATORY: normal effort, lungs clear to auscultation bilaterally  ABDOMEN: soft, mild diffuse tenderness without rebound or guarding, abdomen is nondistended, bowel sounds are normal.  Rectal examination reveals external hemorrhoids with no thrombosis but macerated appearance with minimal bleeding noted.  MUSCULOSKELETAL: no deformity  NEURO: alert, moves all extremities, follows commands  SKIN: warm, dry    Vital signs and nursing notes reviewed.          LAB RESULTS  Recent Results (from the past 24 hour(s))   Comprehensive Metabolic Panel    Collection Time: 09/25/19 10:09 AM   Result Value Ref Range    Glucose 111 (H) 65 - 99 mg/dL    BUN 13 8 - 23 mg/dL    Creatinine 0.85 0.57 - 1.00 mg/dL    Sodium 144 136 - 145 mmol/L    Potassium 4.3 3.5 - 5.2 mmol/L    Chloride 106 98 - 107 mmol/L    CO2 26.0 22.0 - 29.0 mmol/L    Calcium 8.8 8.6 - 10.5 mg/dL    Total Protein 6.7 6.0 - 8.5 g/dL    Albumin 4.00 3.50 - 5.20 g/dL    ALT (SGPT) 12 1 - 33 U/L    AST (SGOT) 18 1 - 32 U/L    Alkaline Phosphatase 67 39 - 117 U/L    Total Bilirubin 0.5 0.2 - 1.2 mg/dL    eGFR Non African Amer 63 >60 mL/min/1.73    Globulin 2.7 gm/dL    A/G Ratio 1.5 g/dL    BUN/Creatinine Ratio 15.3 7.0 - 25.0    Anion Gap 12.0 5.0 - 15.0 mmol/L   Lipase    Collection Time: 09/25/19 10:09 AM   Result Value Ref Range    Lipase 14 13 - 60 U/L   CBC Auto Differential    Collection Time: 09/25/19 10:09 AM   Result Value Ref Range    WBC 8.37 3.40 - 10.80 10*3/mm3    RBC 4.10 3.77 - 5.28 10*6/mm3    Hemoglobin 12.5 12.0 - 15.9 g/dL    Hematocrit 39.2 34.0 - 46.6 %    MCV 95.6 79.0 - 97.0 fL    MCH 30.5 26.6 - 33.0 pg    MCHC 31.9 31.5 - 35.7 g/dL    RDW 12.1 (L) 12.3 - 15.4 %    RDW-SD  43.2 37.0 - 54.0 fl    MPV 11.5 6.0 - 12.0 fL    Platelets 197 140 - 450 10*3/mm3    Neutrophil % 62.9 42.7 - 76.0 %    Lymphocyte % 30.6 19.6 - 45.3 %    Monocyte % 5.3 5.0 - 12.0 %    Eosinophil % 1.0 0.3 - 6.2 %    Basophil % 0.1 0.0 - 1.5 %    Immature Grans % 0.1 0.0 - 0.5 %    Neutrophils, Absolute 5.27 1.70 - 7.00 10*3/mm3    Lymphocytes, Absolute 2.56 0.70 - 3.10 10*3/mm3    Monocytes, Absolute 0.44 0.10 - 0.90 10*3/mm3    Eosinophils, Absolute 0.08 0.00 - 0.40 10*3/mm3    Basophils, Absolute 0.01 0.00 - 0.20 10*3/mm3    Immature Grans, Absolute 0.01 0.00 - 0.05 10*3/mm3   Protime-INR    Collection Time: 09/25/19 10:45 AM   Result Value Ref Range    Protime 13.1 12.1 - 15.0 Seconds    INR 1.02 0.90 - 1.10   aPTT    Collection Time: 09/25/19 10:45 AM   Result Value Ref Range    PTT 34.8 24.3 - 38.1 seconds       Ordered the above labs and reviewed the results.        RADIOLOGY  Ct Abdomen Pelvis With Contrast    Result Date: 9/25/2019  CT SCAN OF THE ABDOMEN AND PELVIS WITH CONTRAST 09/25/2019  HISTORY: Mid abdominal pain for the past 4 days with occasional blood in stool. Patient developed bright red blood per rectum today. Mild diffuse cramping abdominal pain and nausea without vomiting today. Intermittent loose stools over the past several days.  TECHNIQUE: Spiral CT was performed through the abdomen and pelvis following intravenous contrast administration only as per clinician request. Radiation dose reduction techniques included automated exposure control or exposure modulation based on body size. Radiation audit for CT and nuclear cardiology exams in the last 12 months: 0.  ABDOMEN FINDINGS: There is a 5 mm low-density lesion in the left hepatic lobe which probably represents a cyst. The spleen contains calcified granulomas. The pancreas and adrenal glands are normal. The gallbladder is surgically absent. There is a 2 cm cyst on the left kidney. The right kidney is normal.  PELVIS FINDINGS: There is  diverticulosis predominantly involving the descending and sigmoid colon. There is subtle stranding in the fat adjacent to the sigmoid colon. Findings could reflect mild diverticulitis. Clinical correlation is recommended. No bowel obstruction or abscess is seen. The gut is otherwise normal. There is no significant adenopathy and there is no free fluid in the abdomen or pelvis. The lung bases demonstrate small to moderate hiatal hernia.      1. Diverticulosis predominantly involving the descending and sigmoid colon. There is questionable minimal stranding in the fat adjacent to the sigmoid colon. Findings could reflect mild diverticulitis. No bowel obstruction or abscess is seen. Clinical correlation recommended. 2. Surgical absence of the gallbladder. 3. Small probable cyst left hepatic lobe. Left renal cyst. 4. Small-to-moderate sized hiatal hernia.  This report was finalized on 9/25/2019 11:28 AM by Dr. Kris Quintana MD.        Ordered the above noted radiological studies. Reviewed by me in PACS.            PROCEDURES  Procedures    Macerated and bleeding external hemorrhoid was treated with topical silver nitrate and hemostasis achieved.  Patient tolerated well.          MEDICATIONS GIVEN IN ER  Medications   sodium chloride 0.9 % flush 10 mL (not administered)   acetaminophen (TYLENOL) tablet 1,000 mg (1,000 mg Oral Given 9/25/19 1011)   sodium chloride 0.9 % bolus 500 mL (0 mL Intravenous Stopped 9/25/19 1050)   silver nitrate 75-25 % applicator 1 application (1 application Topical Given by Other 9/25/19 0882)   iopamidol (ISOVUE-370) 76 % injection 100 mL (100 mL Intravenous Given 9/25/19 1116)                   PROGRESS AND CONSULTS           MEDICAL DECISION MAKING    89-year-old female presents with complaint of 4 days of crampy abdominal pain and associated bright red blood with wiping initially now dripping in the toilet when she goes to the bathroom.  She is awake and alert and hemodynamically stable.   She takes only baby aspirin, no other anticoagulants.  On examination she had mild diffuse abdominal tenderness and also had a small external hemorrhoid which was slowly bleeding, hemostasis was achieved with topical silver nitrate.  Given the hemorrhoid should not be contributing to her abdominal pain, labs and CT the abdomen pelvis were obtained.  I reviewed her recent CMP from July 2019.  Her creatinine was normal at that time and is again normal today.  Hemoglobin is also normal today.  I ordered a 500 cc normal saline bolus today prior to obtaining CT abdomen pelvis with IV contrast.  CT of the abdomen was remarkable for sigmoid diverticulosis with subtle surrounding fat stranding suggestive of mild acute diverticulitis.  Based on her allergy profile and history of C. difficile, Flagyl monotherapy is the most appropriate medication for her at this time.  I also prescribed her Proctofoam topically for her hemorrhoids.  She was advised to follow-up with her primary care physician and return precautions were discussed.    MDM           DIAGNOSIS  Final diagnoses:   Sigmoid diverticulitis   External hemorrhoid, bleeding         DISPOSITION  Discharge            Latest Documented Vital Signs:  As of 1:53 PM  BP- 177/96 HR- 74 Temp- 97.8 °F (36.6 °C) (Oral) O2 sat- 99%        --    Please note that portions of this were completed with a voice recognition program.          Jericho Hernandez MD  09/25/19 6521

## 2019-09-25 NOTE — ED NOTES
Patient incontinent of urine after CT scan, patient cleaned up and brief placed on patient     Kirstin Chisholm RN  09/25/19 3477

## 2019-10-14 ENCOUNTER — APPOINTMENT (OUTPATIENT)
Dept: CT IMAGING | Facility: HOSPITAL | Age: 84
End: 2019-10-14

## 2019-10-14 ENCOUNTER — HOSPITAL ENCOUNTER (EMERGENCY)
Facility: HOSPITAL | Age: 84
Discharge: HOME | End: 2019-10-14
Attending: EMERGENCY MEDICINE | Admitting: EMERGENCY MEDICINE

## 2019-10-14 VITALS
HEART RATE: 72 BPM | DIASTOLIC BLOOD PRESSURE: 70 MMHG | BODY MASS INDEX: 32.86 KG/M2 | SYSTOLIC BLOOD PRESSURE: 166 MMHG | OXYGEN SATURATION: 96 % | WEIGHT: 142 LBS | RESPIRATION RATE: 16 BRPM | TEMPERATURE: 98 F | HEIGHT: 55 IN

## 2019-10-14 DIAGNOSIS — K64.9 BLEEDING HEMORRHOIDS: Primary | ICD-10-CM

## 2019-10-14 DIAGNOSIS — R19.7 DIARRHEA, UNSPECIFIED TYPE: ICD-10-CM

## 2019-10-14 LAB
ALBUMIN SERPL-MCNC: 3.9 G/DL (ref 3.5–5.2)
ALBUMIN/GLOB SERPL: 1.3 G/DL
ALP SERPL-CCNC: 67 U/L (ref 39–117)
ALT SERPL W P-5'-P-CCNC: 15 U/L (ref 1–33)
ANION GAP SERPL CALCULATED.3IONS-SCNC: 10.3 MMOL/L (ref 5–15)
APTT PPP: 39.3 SECONDS (ref 24.3–38.1)
AST SERPL-CCNC: 20 U/L (ref 1–32)
BACTERIA UR QL AUTO: ABNORMAL /HPF
BASOPHILS # BLD AUTO: 0.02 10*3/MM3 (ref 0–0.2)
BASOPHILS NFR BLD AUTO: 0.3 % (ref 0–1.5)
BILIRUB SERPL-MCNC: 0.6 MG/DL (ref 0.2–1.2)
BILIRUB UR QL STRIP: NEGATIVE
BUN BLD-MCNC: 19 MG/DL (ref 8–23)
BUN/CREAT SERPL: 23.8 (ref 7–25)
CALCIUM SPEC-SCNC: 9.3 MG/DL (ref 8.6–10.5)
CHLORIDE SERPL-SCNC: 102 MMOL/L (ref 98–107)
CLARITY UR: CLEAR
CO2 SERPL-SCNC: 25.7 MMOL/L (ref 22–29)
COLOR UR: YELLOW
CREAT BLD-MCNC: 0.8 MG/DL (ref 0.57–1)
DEPRECATED RDW RBC AUTO: 45.2 FL (ref 37–54)
EOSINOPHIL # BLD AUTO: 0.13 10*3/MM3 (ref 0–0.4)
EOSINOPHIL NFR BLD AUTO: 1.8 % (ref 0.3–6.2)
ERYTHROCYTE [DISTWIDTH] IN BLOOD BY AUTOMATED COUNT: 12.5 % (ref 12.3–15.4)
GFR SERPL CREATININE-BSD FRML MDRD: 68 ML/MIN/1.73
GLOBULIN UR ELPH-MCNC: 3.1 GM/DL
GLUCOSE BLD-MCNC: 101 MG/DL (ref 65–99)
GLUCOSE UR STRIP-MCNC: NEGATIVE MG/DL
HCT VFR BLD AUTO: 40.3 % (ref 34–46.6)
HGB BLD-MCNC: 12.8 G/DL (ref 12–15.9)
HGB UR QL STRIP.AUTO: ABNORMAL
HYALINE CASTS UR QL AUTO: ABNORMAL /LPF
IMM GRANULOCYTES # BLD AUTO: 0.02 10*3/MM3 (ref 0–0.05)
IMM GRANULOCYTES NFR BLD AUTO: 0.3 % (ref 0–0.5)
INR PPP: 1.03 (ref 0.9–1.1)
KETONES UR QL STRIP: NEGATIVE
LEUKOCYTE ESTERASE UR QL STRIP.AUTO: NEGATIVE
LIPASE SERPL-CCNC: 17 U/L (ref 13–60)
LYMPHOCYTES # BLD AUTO: 2.7 10*3/MM3 (ref 0.7–3.1)
LYMPHOCYTES NFR BLD AUTO: 37.5 % (ref 19.6–45.3)
MCH RBC QN AUTO: 31.4 PG (ref 26.6–33)
MCHC RBC AUTO-ENTMCNC: 31.8 G/DL (ref 31.5–35.7)
MCV RBC AUTO: 99 FL (ref 79–97)
MONOCYTES # BLD AUTO: 0.5 10*3/MM3 (ref 0.1–0.9)
MONOCYTES NFR BLD AUTO: 6.9 % (ref 5–12)
NEUTROPHILS # BLD AUTO: 3.83 10*3/MM3 (ref 1.7–7)
NEUTROPHILS NFR BLD AUTO: 53.2 % (ref 42.7–76)
NITRITE UR QL STRIP: NEGATIVE
NRBC BLD AUTO-RTO: 0 /100 WBC (ref 0–0.2)
PH UR STRIP.AUTO: 5.5 [PH] (ref 4.5–8)
PLATELET # BLD AUTO: 200 10*3/MM3 (ref 140–450)
PMV BLD AUTO: 10.7 FL (ref 6–12)
POTASSIUM BLD-SCNC: 4.2 MMOL/L (ref 3.5–5.2)
PROT SERPL-MCNC: 7 G/DL (ref 6–8.5)
PROT UR QL STRIP: NEGATIVE
PROTHROMBIN TIME: 13.2 SECONDS (ref 12.1–15)
RBC # BLD AUTO: 4.07 10*6/MM3 (ref 3.77–5.28)
RBC # UR: ABNORMAL /HPF
REF LAB TEST METHOD: ABNORMAL
SODIUM BLD-SCNC: 138 MMOL/L (ref 136–145)
SP GR UR STRIP: <=1.005 (ref 1–1.03)
SQUAMOUS #/AREA URNS HPF: ABNORMAL /HPF
UROBILINOGEN UR QL STRIP: ABNORMAL
WBC NRBC COR # BLD: 7.2 10*3/MM3 (ref 3.4–10.8)
WBC UR QL AUTO: ABNORMAL /HPF

## 2019-10-14 PROCEDURE — 85610 PROTHROMBIN TIME: CPT | Performed by: PHYSICIAN ASSISTANT

## 2019-10-14 PROCEDURE — 99284 EMERGENCY DEPT VISIT MOD MDM: CPT

## 2019-10-14 PROCEDURE — 74176 CT ABD & PELVIS W/O CONTRAST: CPT

## 2019-10-14 PROCEDURE — 99284 EMERGENCY DEPT VISIT MOD MDM: CPT | Performed by: PHYSICIAN ASSISTANT

## 2019-10-14 PROCEDURE — 85730 THROMBOPLASTIN TIME PARTIAL: CPT | Performed by: PHYSICIAN ASSISTANT

## 2019-10-14 PROCEDURE — 80053 COMPREHEN METABOLIC PANEL: CPT | Performed by: PHYSICIAN ASSISTANT

## 2019-10-14 PROCEDURE — 85025 COMPLETE CBC W/AUTO DIFF WBC: CPT | Performed by: PHYSICIAN ASSISTANT

## 2019-10-14 PROCEDURE — 96374 THER/PROPH/DIAG INJ IV PUSH: CPT

## 2019-10-14 PROCEDURE — 81001 URINALYSIS AUTO W/SCOPE: CPT | Performed by: PHYSICIAN ASSISTANT

## 2019-10-14 PROCEDURE — 83690 ASSAY OF LIPASE: CPT | Performed by: PHYSICIAN ASSISTANT

## 2019-10-14 PROCEDURE — P9612 CATHETERIZE FOR URINE SPEC: HCPCS

## 2019-10-14 RX ORDER — HYDROCORTISONE ACETATE 25 MG/1
25 SUPPOSITORY RECTAL 2 TIMES DAILY
Status: DISCONTINUED | OUTPATIENT
Start: 2019-10-14 | End: 2019-10-14 | Stop reason: HOSPADM

## 2019-10-14 RX ORDER — SODIUM CHLORIDE 0.9 % (FLUSH) 0.9 %
10 SYRINGE (ML) INJECTION AS NEEDED
Status: DISCONTINUED | OUTPATIENT
Start: 2019-10-14 | End: 2019-10-14 | Stop reason: HOSPADM

## 2019-10-14 RX ADMIN — FAMOTIDINE 20 MG: 10 INJECTION, SOLUTION INTRAVENOUS at 11:56

## 2019-10-14 RX ADMIN — HYDROCORTISONE ACETATE 25 MG: 25 SUPPOSITORY RECTAL at 12:47

## 2019-10-14 NOTE — ED NOTES
Pt only received one suppository per PA order. Pt did not give stool sp. And due to difficulty in drawing blood but did not have a type and screen drawn     Bonnie Raygoza RN  10/14/19 4137

## 2019-10-14 NOTE — ED PROVIDER NOTES
"Subjective   History of Present Illness  History of Present Illness    Chief complaint: Rectal bleeding    Location: Rectum    Quality/Severity: Bright red blood in the toilet and on the toilet paper    Timing/Duration: last night    Modifying Factors: Worse with bowel movement. immodium last night helped mildly.    Associated Symptoms: Positive watery diarrhea that started yesterday. (2 \"bad\" episodes and 2 light episodes).  Positive diffuse abdominal pain.  Denies chest pain or shortness of breath.  Denies fevers or chills. Denies nausea or vomiting.    Narrative: 89-year-old female presents with diarrhea associated with rectal bleeding as above.  She was seen on 9/25, when she was diagnosed with diverticulitis and bleeding hemorrhoid.  She was treated with Flagyl monotherapy with a history of C. difficile colitis and allergy profile.  She was also given a prescription for Proctofoam.  Patient states she had started to feel better until last night when the diarrhea began again.  She is out of the Proctofoam.    EGD July 2017:  Findings: Normal Duodenum  Normal Stomach  Hiatal hernia (small)   Well controlled reflux esophagitis  Non-Stricturing ring - attempted dilation w 18mm No effect on post dilation exam    Review of Systems   Constitutional: Negative.  Negative for chills and fever.   HENT: Negative.    Eyes: Negative.    Respiratory: Negative.  Negative for shortness of breath.    Cardiovascular: Negative.  Negative for chest pain.   Gastrointestinal: Positive for abdominal pain, blood in stool and diarrhea. Negative for nausea and vomiting.   Genitourinary: Positive for dysuria, frequency and urgency. Negative for hematuria.   Musculoskeletal: Negative.    Skin: Negative.    Neurological: Negative.  Negative for dizziness and light-headedness.   Hematological: Negative.  Does not bruise/bleed easily.   Psychiatric/Behavioral: Negative.    All other systems reviewed and are negative.      Past Medical " History:   Diagnosis Date   • Abdominal pain    • Anxiety    • Arthritis    • Atrial fibrillation (CMS/HCC)    • C. difficile colitis    • Diarrhea    • GERD (gastroesophageal reflux disease)    • Hyperlipidemia    • Hypertension    • Hypothyroid    • Presbyesophagus    • Sinus congestion    • Thrush        Allergies   Allergen Reactions   • Biaxin [Clarithromycin] Nausea And Vomiting   • Ceftin [Cefuroxime]      Uncertain if true allergy   • Celexa [Citalopram] Dizziness   • Chlorpheniramine Other (See Comments)     tachycardia   • Ciprofloxacin      Uncertain if true allergy   • Crestor [Rosuvastatin] Other (See Comments)     Elevated LFTs   • Doxycycline Nausea And Vomiting   • Effexor [Venlafaxine] Nausea Only   • Fluoxetine Other (See Comments)     Abdominal pain   • Hydrocodone Bitartrate      insomnia   • Imipramine Hcl      Uncertain if true allergy   • Levaquin [Levofloxacin] Diarrhea   • Lexapro [Escitalopram Oxalate] Nausea Only   • Macrobid [Nitrofurantoin Monohyd Macro]      Uncertain if true allergy   • Methscopolamine      Uncertain if true allergy   • Phenylpropanolamine      Uncertain if true allergy   • Prednisone      Uncertain if true allergy   • Promethazine      Uncertain if true allergy   • Simvastatin      Uncertain if true allergy     • Verapamil      Uncertain if true allergy   • Welchol [Colesevelam Hcl]      Uncertain if true allergy   • Zetia [Ezetimibe]      Uncertain if true allergy     • Zoloft [Sertraline Hcl] Nausea Only   • Penicillins Rash       Past Surgical History:   Procedure Laterality Date   • ABDOMINAL SURGERY     • APPENDECTOMY     • CHOLECYSTECTOMY     • ENDOSCOPY N/A 7/5/2017    Procedure: ESOPHAGOGASTRODUODENOSCOPY WITH DILATION;  Surgeon: Betito Montejo MD;  Location: [facility];  Service:    • HYSTERECTOMY     • KNEE ARTHROSCOPY Right    • THYROIDECTOMY         Family History   Problem Relation Age of Onset   • Heart disease Father    • Cancer Sister    •  Cancer Brother        Social History     Socioeconomic History   • Marital status: Unknown     Spouse name: Not on file   • Number of children: Not on file   • Years of education: Not on file   • Highest education level: Not on file   Tobacco Use   • Smoking status: Never Smoker   • Smokeless tobacco: Never Used   Substance and Sexual Activity   • Alcohol use: No   • Drug use: No   • Sexual activity: Defer         Current Facility-Administered Medications:   •  famotidine (PEPCID) injection 20 mg, 20 mg, Intravenous, Once, Jess Manzo PA-C  •  Insert peripheral IV, , , Once **AND** sodium chloride 0.9 % flush 10 mL, 10 mL, Intravenous, PRN, Jess Manzo PA-C    Current Outpatient Medications:   •  aspirin 81 MG chewable tablet, Chew 81 mg Daily., Disp: , Rfl:   •  benzonatate (TESSALON) 100 MG capsule, Take 100 mg by mouth 3 (Three) Times a Day As Needed for Cough., Disp: , Rfl:   •  Calcium Carbonate-Vitamin D (CALCIUM 500 + D PO), Take 1 tablet by mouth 2 (Two) Times a Day., Disp: , Rfl:   •  clotrimazole-betamethasone (LOTRISONE) 1-0.05 % cream, Apply 1 application topically 2 (Two) Times a Day As Needed (HEMMROIDS)., Disp: , Rfl:   •  dicyclomine (BENTYL) 10 MG capsule, Take 10 mg by mouth 4 (Four) Times a Day Before Meals & at Bedtime As Needed (ABDOMINAL PAIN)., Disp: , Rfl:   •  diltiaZEM (CARDIZEM) 30 MG tablet, Take 30 mg by mouth 2 (Two) Times a Day., Disp: , Rfl:   •  fluticasone (FLONASE) 50 MCG/ACT nasal spray, 2 sprays into each nostril Daily., Disp: , Rfl:   •  guaiFENesin (MUCINEX) 600 MG 12 hr tablet, Take 600 mg by mouth Every 12 (Twelve) Hours As Needed for Cough., Disp: , Rfl:   •  hydrocortisone (ANUSOL-HC) 2.5 % rectal cream, Insert  into the rectum Daily As Needed for Hemorrhoids., Disp: , Rfl:   •  hydrocortisone-pramoxine (PROCTOFOAM-HS) 1-1 % rectal foam, Insert 1 applicator into the rectum 2 (Two) Times a Day., Disp: 10 g, Rfl: 0  •  lactobacillus acidophilus (RISAQUAD) capsule  capsule, Take 1 capsule by mouth Daily., Disp: 30 capsule, Rfl: 0  •  levocetirizine (XYZAL) 5 MG tablet, Take 5 mg by mouth Every Evening., Disp: , Rfl:   •  levothyroxine (SYNTHROID) 50 MCG tablet, Take 50 mcg by mouth Daily., Disp: , Rfl:   •  lisinopril (PRINIVIL,ZESTRIL) 20 MG tablet, Take 1 tablet by mouth Daily. Take one half tablet daily for 1 week then advanced to one full tablet daily, Disp: 30 tablet, Rfl: 0  •  LORazepam (ATIVAN) 1 MG tablet, Take 1 mg by mouth 3 (Three) Times a Day. Takes 1 in AM; 2 at HS.  Per daughter in law pt PMD told pt she can take an extra at 5pm, Disp: , Rfl:   •  meclizine (ANTIVERT) 12.5 MG tablet, Take 12.5 mg by mouth 3 (Three) Times a Day As Needed for dizziness., Disp: , Rfl:   •  Multiple Vitamins-Minerals (CENTRUM SILVER PO), Take 1 tablet by mouth Daily., Disp: , Rfl:   •  pantoprazole (PROTONIX) 40 MG EC tablet, Take 40 mg by mouth Daily., Disp: , Rfl:   •  potassium chloride (K-DUR,KLOR-CON) 20 MEQ CR tablet, Take 20 mEq by mouth 2 (Two) Times a Day., Disp: , Rfl:   •  Potassium Chloride (KLOR-CON 10 PO), Take 10 mEq by mouth Daily., Disp: , Rfl:   •  raNITIdine (ZANTAC) 150 MG tablet, Take 150 mg by mouth 2 (Two) Times a Day., Disp: , Rfl:   •  simethicone (MYLICON) 125 MG chewable tablet, Chew 125 mg Every 6 (Six) Hours As Needed for Flatulence., Disp: , Rfl:   •  sucralfate (CARAFATE) 1 G tablet, Take 1 g by mouth 4 (Four) Times a Day., Disp: , Rfl:       Objective   Physical Exam  Vitals:    10/14/19 1056   BP: 173/68   Pulse:    Resp:    Temp:    SpO2: 99%   Heart rate 71, respirations 16, temp 98    GENERAL: a/o x 4, NAD  SKIN: Warm pink and dry   HEENT:  PERRLA, EOM intact, conjunctiva normal, sclera clear  NECK: supple, no JVD  LUNGS: Clear to auscultation bilaterally without wheezes, rales or rhonchi.  No accessory muscle use and no nasal flaring.  CARDIAC:  Regular rate and rhythm, S1-S2.  No murmurs, rubs or gallops.  No peripheral edema.  Equal pulses  bilaterally.  ABDOMEN: Soft, diffusely mildly tender.  Mildly distended.  No guarding or rebound tenderness.  Hyperactive bowel sounds.  RECTAL: external hemorrhoids, no active bleeding. + internal hemorrhoids. Stool light brown with brb streaks.   MUSCULOSKELETAL: Moves all extremities well.  No deformity.  NEURO: Cranial nerves II through XII grossly intact.  No gross focal deficits.  Alert.  Normal speech and motor.  PSYCH: Normal mood and affect        Procedures           ED Course  ED Course as of Oct 14 1333   Mon Oct 14, 2019   1308 Stable from 2 weeks ago Hemoglobin: 12.8 [[place]]      ED Course User Index  [[place]] Jess Manzo, CAMDEN keenan given    Reviewed ct a/p. Independently viewed by me. Interpreted by radiologist. Discussed with pt and daughter.  Ct Abdomen Pelvis Without Contrast    Result Date: 10/14/2019  Narrative: CT SCAN OF THE ABDOMEN AND PELVIS WITHOUT CONTRAST 10/14/2019  HISTORY: Diarrhea and rectal bleeding for the past 2 weeks. Hysterectomy and cholecystectomy.  TECHNIQUE: Spiral CT was performed through the abdomen and pelvis without oral or intravenous contrast administration as per clinician request. Radiation dose reduction techniques included automated exposure control or exposure modulation based on body size. Radiation audit for CT and nuclear cardiology exams in the last 12 months: 1.  ABDOMEN FINDINGS: Examination is limited by the lack of oral and intravenous contrast. The liver is normal in appearance. Calcified granulomas are seen in the spleen. The pancreas, adrenal glands and right kidney are normal. Stable left renal cyst compared with 09/25/2019. The gallbladder is surgically absent. There is a small hiatal hernia.  PELVIS FINDINGS: There is extensive colonic diverticulosis without evidence of diverticulitis. The gut is otherwise unremarkable. No significant adenopathy and there is no free fluid in the abdomen or pelvis. The lung bases are unremarkable.      Impression: 1.  Diverticulosis. No evidence of diverticulitis. 2. Surgical absence of the gallbladder. 3. Small hiatal hernia. 4. Stable cyst left kidney compared with 09/25/2019.  This report was finalized on 10/14/2019 11:51 AM by Dr. Kris Quintana MD.      Ct Abdomen Pelvis With Contrast    Result Date: 9/25/2019  Narrative: CT SCAN OF THE ABDOMEN AND PELVIS WITH CONTRAST 09/25/2019  HISTORY: Mid abdominal pain for the past 4 days with occasional blood in stool. Patient developed bright red blood per rectum today. Mild diffuse cramping abdominal pain and nausea without vomiting today. Intermittent loose stools over the past several days.  TECHNIQUE: Spiral CT was performed through the abdomen and pelvis following intravenous contrast administration only as per clinician request. Radiation dose reduction techniques included automated exposure control or exposure modulation based on body size. Radiation audit for CT and nuclear cardiology exams in the last 12 months: 0.  ABDOMEN FINDINGS: There is a 5 mm low-density lesion in the left hepatic lobe which probably represents a cyst. The spleen contains calcified granulomas. The pancreas and adrenal glands are normal. The gallbladder is surgically absent. There is a 2 cm cyst on the left kidney. The right kidney is normal.  PELVIS FINDINGS: There is diverticulosis predominantly involving the descending and sigmoid colon. There is subtle stranding in the fat adjacent to the sigmoid colon. Findings could reflect mild diverticulitis. Clinical correlation is recommended. No bowel obstruction or abscess is seen. The gut is otherwise normal. There is no significant adenopathy and there is no free fluid in the abdomen or pelvis. The lung bases demonstrate small to moderate hiatal hernia.      Impression: 1. Diverticulosis predominantly involving the descending and sigmoid colon. There is questionable minimal stranding in the fat adjacent to the sigmoid colon. Findings could reflect mild  diverticulitis. No bowel obstruction or abscess is seen. Clinical correlation recommended. 2. Surgical absence of the gallbladder. 3. Small probable cyst left hepatic lobe. Left renal cyst. 4. Small-to-moderate sized hiatal hernia.  This report was finalized on 9/25/2019 11:28 AM by Dr. Kris Quintana MD.      Results for orders placed or performed during the hospital encounter of 10/14/19   Comprehensive Metabolic Panel   Result Value Ref Range    Glucose 101 (H) 65 - 99 mg/dL    BUN 19 8 - 23 mg/dL    Creatinine 0.80 0.57 - 1.00 mg/dL    Sodium 138 136 - 145 mmol/L    Potassium 4.2 3.5 - 5.2 mmol/L    Chloride 102 98 - 107 mmol/L    CO2 25.7 22.0 - 29.0 mmol/L    Calcium 9.3 8.6 - 10.5 mg/dL    Total Protein 7.0 6.0 - 8.5 g/dL    Albumin 3.90 3.50 - 5.20 g/dL    ALT (SGPT) 15 1 - 33 U/L    AST (SGOT) 20 1 - 32 U/L    Alkaline Phosphatase 67 39 - 117 U/L    Total Bilirubin 0.6 0.2 - 1.2 mg/dL    eGFR Non African Amer 68 >60 mL/min/1.73    Globulin 3.1 gm/dL    A/G Ratio 1.3 g/dL    BUN/Creatinine Ratio 23.8 7.0 - 25.0    Anion Gap 10.3 5.0 - 15.0 mmol/L   Protime-INR   Result Value Ref Range    Protime 13.2 12.1 - 15.0 Seconds    INR 1.03 0.90 - 1.10   aPTT   Result Value Ref Range    PTT 39.3 (H) 24.3 - 38.1 seconds   CBC Auto Differential   Result Value Ref Range    WBC 7.20 3.40 - 10.80 10*3/mm3    RBC 4.07 3.77 - 5.28 10*6/mm3    Hemoglobin 12.8 12.0 - 15.9 g/dL    Hematocrit 40.3 34.0 - 46.6 %    MCV 99.0 (H) 79.0 - 97.0 fL    MCH 31.4 26.6 - 33.0 pg    MCHC 31.8 31.5 - 35.7 g/dL    RDW 12.5 12.3 - 15.4 %    RDW-SD 45.2 37.0 - 54.0 fl    MPV 10.7 6.0 - 12.0 fL    Platelets 200 140 - 450 10*3/mm3    Neutrophil % 53.2 42.7 - 76.0 %    Lymphocyte % 37.5 19.6 - 45.3 %    Monocyte % 6.9 5.0 - 12.0 %    Eosinophil % 1.8 0.3 - 6.2 %    Basophil % 0.3 0.0 - 1.5 %    Immature Grans % 0.3 0.0 - 0.5 %    Neutrophils, Absolute 3.83 1.70 - 7.00 10*3/mm3    Lymphocytes, Absolute 2.70 0.70 - 3.10 10*3/mm3    Monocytes,  Absolute 0.50 0.10 - 0.90 10*3/mm3    Eosinophils, Absolute 0.13 0.00 - 0.40 10*3/mm3    Basophils, Absolute 0.02 0.00 - 0.20 10*3/mm3    Immature Grans, Absolute 0.02 0.00 - 0.05 10*3/mm3    nRBC 0.0 0.0 - 0.2 /100 WBC   Urinalysis With Culture If Indicated - Urine, Catheter   Result Value Ref Range    Color, UA Yellow Yellow, Straw    Appearance, UA Clear Clear    pH, UA 5.5 4.5 - 8.0    Specific Gravity, UA <=1.005 1.003 - 1.030    Glucose, UA Negative Negative    Ketones, UA Negative Negative    Bilirubin, UA Negative Negative    Blood, UA Trace (A) Negative    Protein, UA Negative Negative    Leuk Esterase, UA Negative Negative    Nitrite, UA Negative Negative    Urobilinogen, UA 0.2 E.U./dL 0.2 - 1.0 E.U./dL   Lipase   Result Value Ref Range    Lipase 17 13 - 60 U/L   Urinalysis, Microscopic Only - Urine, Catheter   Result Value Ref Range    RBC, UA 0-2 (A) None Seen /HPF    WBC, UA 0-2 (A) None Seen /HPF    Bacteria, UA None Seen None Seen /HPF    Squamous Epithelial Cells, UA 0-2 None Seen, 0-2 /HPF    Hyaline Casts, UA None Seen None Seen /LPF    Methodology Manual Light Microscopy      1356-no diarrhea or rectal bleeding since arrival.  Patient's hemoglobin is stable.  Suspect the hemorrhoids were aggravated by the couple of episodes that she had of diarrhea.  White count is normal and patient is afebrile.  No evidence of colitis or diverticulitis on CT scan.  Will refill her Proctofoam.  She can follow-up with GI as an outpatient.    Discussed pertinent labs and imaging findings with the patient/family.  Patient/Family voiced understanding of need to follow-up for recheck, further testing as needed.  Return to the emergency Department warnings were given.              MDM  Number of Diagnoses or Management Options  Bleeding hemorrhoids: established and worsening  Diarrhea, unspecified type: established and worsening     Amount and/or Complexity of Data Reviewed  Clinical lab tests: reviewed and  ordered  Tests in the radiology section of CPT®: reviewed and ordered  Tests in the medicine section of CPT®: ordered and reviewed  Decide to obtain previous medical records or to obtain history from someone other than the patient: yes  Obtain history from someone other than the patient: yes  Review and summarize past medical records: yes  Independent visualization of images, tracings, or specimens: yes    Risk of Complications, Morbidity, and/or Mortality  Presenting problems: moderate  Diagnostic procedures: moderate  Management options: moderate    Patient Progress  Patient progress: improved      Final diagnoses:   Bleeding hemorrhoids   Diarrhea, unspecified type       Dictated utilizing Dragon dictation       Jess Manzo, PAAndrewC  10/14/19 6745

## 2019-12-13 ENCOUNTER — HOSPITAL ENCOUNTER (EMERGENCY)
Facility: HOSPITAL | Age: 84
Discharge: HOME | End: 2019-12-13
Attending: EMERGENCY MEDICINE | Admitting: EMERGENCY MEDICINE

## 2019-12-13 ENCOUNTER — APPOINTMENT (OUTPATIENT)
Dept: CT IMAGING | Facility: HOSPITAL | Age: 84
End: 2019-12-13

## 2019-12-13 VITALS
BODY MASS INDEX: 34.71 KG/M2 | DIASTOLIC BLOOD PRESSURE: 89 MMHG | RESPIRATION RATE: 16 BRPM | SYSTOLIC BLOOD PRESSURE: 183 MMHG | HEIGHT: 55 IN | TEMPERATURE: 97.8 F | OXYGEN SATURATION: 99 % | HEART RATE: 69 BPM | WEIGHT: 150 LBS

## 2019-12-13 DIAGNOSIS — R31.9 HEMATURIA, UNSPECIFIED TYPE: Primary | ICD-10-CM

## 2019-12-13 DIAGNOSIS — R30.0 DYSURIA: ICD-10-CM

## 2019-12-13 DIAGNOSIS — R10.30 LOWER ABDOMINAL PAIN: ICD-10-CM

## 2019-12-13 LAB
ALBUMIN SERPL-MCNC: 4 G/DL (ref 3.5–5.2)
ALBUMIN/GLOB SERPL: 1.4 G/DL
ALP SERPL-CCNC: 68 U/L (ref 39–117)
ALT SERPL W P-5'-P-CCNC: 12 U/L (ref 1–33)
ANION GAP SERPL CALCULATED.3IONS-SCNC: 12 MMOL/L (ref 5–15)
APTT PPP: 38.3 SECONDS (ref 24.3–38.1)
AST SERPL-CCNC: 18 U/L (ref 1–32)
BACTERIA UR QL AUTO: ABNORMAL /HPF
BASOPHILS # BLD AUTO: 0.02 10*3/MM3 (ref 0–0.2)
BASOPHILS NFR BLD AUTO: 0.2 % (ref 0–1.5)
BILIRUB SERPL-MCNC: 0.3 MG/DL (ref 0.2–1.2)
BILIRUB UR QL STRIP: NEGATIVE
BUN BLD-MCNC: 14 MG/DL (ref 8–23)
BUN/CREAT SERPL: 17.5 (ref 7–25)
CALCIUM SPEC-SCNC: 9 MG/DL (ref 8.6–10.5)
CHLORIDE SERPL-SCNC: 105 MMOL/L (ref 98–107)
CLARITY UR: CLEAR
CO2 SERPL-SCNC: 25 MMOL/L (ref 22–29)
COLOR UR: YELLOW
CREAT BLD-MCNC: 0.8 MG/DL (ref 0.57–1)
DEPRECATED RDW RBC AUTO: 44.4 FL (ref 37–54)
EOSINOPHIL # BLD AUTO: 0.08 10*3/MM3 (ref 0–0.4)
EOSINOPHIL NFR BLD AUTO: 0.9 % (ref 0.3–6.2)
ERYTHROCYTE [DISTWIDTH] IN BLOOD BY AUTOMATED COUNT: 12.5 % (ref 12.3–15.4)
GFR SERPL CREATININE-BSD FRML MDRD: 68 ML/MIN/1.73
GLOBULIN UR ELPH-MCNC: 2.9 GM/DL
GLUCOSE BLD-MCNC: 99 MG/DL (ref 65–99)
GLUCOSE UR STRIP-MCNC: NEGATIVE MG/DL
HCT VFR BLD AUTO: 40.4 % (ref 34–46.6)
HGB BLD-MCNC: 13.1 G/DL (ref 12–15.9)
HGB UR QL STRIP.AUTO: ABNORMAL
HYALINE CASTS UR QL AUTO: ABNORMAL /LPF
IMM GRANULOCYTES # BLD AUTO: 0.02 10*3/MM3 (ref 0–0.05)
IMM GRANULOCYTES NFR BLD AUTO: 0.2 % (ref 0–0.5)
INR PPP: 1 (ref 0.9–1.1)
KETONES UR QL STRIP: NEGATIVE
LEUKOCYTE ESTERASE UR QL STRIP.AUTO: NEGATIVE
LYMPHOCYTES # BLD AUTO: 2.75 10*3/MM3 (ref 0.7–3.1)
LYMPHOCYTES NFR BLD AUTO: 31.1 % (ref 19.6–45.3)
MCH RBC QN AUTO: 31.6 PG (ref 26.6–33)
MCHC RBC AUTO-ENTMCNC: 32.4 G/DL (ref 31.5–35.7)
MCV RBC AUTO: 97.3 FL (ref 79–97)
MONOCYTES # BLD AUTO: 0.57 10*3/MM3 (ref 0.1–0.9)
MONOCYTES NFR BLD AUTO: 6.4 % (ref 5–12)
NEUTROPHILS # BLD AUTO: 5.41 10*3/MM3 (ref 1.7–7)
NEUTROPHILS NFR BLD AUTO: 61.2 % (ref 42.7–76)
NITRITE UR QL STRIP: NEGATIVE
NRBC BLD AUTO-RTO: 0 /100 WBC (ref 0–0.2)
PH UR STRIP.AUTO: 6.5 [PH] (ref 4.5–8)
PLATELET # BLD AUTO: 211 10*3/MM3 (ref 140–450)
PMV BLD AUTO: 10.4 FL (ref 6–12)
POTASSIUM BLD-SCNC: 4.1 MMOL/L (ref 3.5–5.2)
PROT SERPL-MCNC: 6.9 G/DL (ref 6–8.5)
PROT UR QL STRIP: NEGATIVE
PROTHROMBIN TIME: 12.9 SECONDS (ref 12.1–15)
RBC # BLD AUTO: 4.15 10*6/MM3 (ref 3.77–5.28)
RBC # UR: ABNORMAL /HPF
REF LAB TEST METHOD: ABNORMAL
SODIUM BLD-SCNC: 142 MMOL/L (ref 136–145)
SP GR UR STRIP: 1.01 (ref 1–1.03)
SQUAMOUS #/AREA URNS HPF: ABNORMAL /HPF
UROBILINOGEN UR QL STRIP: ABNORMAL
WBC NRBC COR # BLD: 8.85 10*3/MM3 (ref 3.4–10.8)
WBC UR QL AUTO: ABNORMAL /HPF

## 2019-12-13 PROCEDURE — 85730 THROMBOPLASTIN TIME PARTIAL: CPT | Performed by: EMERGENCY MEDICINE

## 2019-12-13 PROCEDURE — 81001 URINALYSIS AUTO W/SCOPE: CPT | Performed by: EMERGENCY MEDICINE

## 2019-12-13 PROCEDURE — 85610 PROTHROMBIN TIME: CPT | Performed by: EMERGENCY MEDICINE

## 2019-12-13 PROCEDURE — 85025 COMPLETE CBC W/AUTO DIFF WBC: CPT | Performed by: EMERGENCY MEDICINE

## 2019-12-13 PROCEDURE — 99283 EMERGENCY DEPT VISIT LOW MDM: CPT

## 2019-12-13 PROCEDURE — 80053 COMPREHEN METABOLIC PANEL: CPT | Performed by: EMERGENCY MEDICINE

## 2019-12-13 PROCEDURE — 99284 EMERGENCY DEPT VISIT MOD MDM: CPT | Performed by: EMERGENCY MEDICINE

## 2019-12-13 PROCEDURE — 74176 CT ABD & PELVIS W/O CONTRAST: CPT

## 2019-12-13 NOTE — ED PROVIDER NOTES
Subjective     Abdominal Pain   Pain location:  Suprapubic  Pain quality: aching    Pain radiates to:  Does not radiate  Pain severity:  Moderate  Onset quality:  Sudden  Timing:  Constant  Progression:  Waxing and waning  Chronicity:  New  Context comment:  Spont onset, also c/o brief BRBPR  Relieved by:  Nothing  Worsened by:  Nothing  Ineffective treatments:  None tried  Associated symptoms: dysuria and hematochezia    Associated symptoms: no chest pain, no diarrhea, no fever, no hematuria, no melena, no shortness of breath and no vaginal bleeding        Review of Systems   Constitutional: Negative for fever.   Respiratory: Negative for shortness of breath.    Cardiovascular: Negative for chest pain.   Gastrointestinal: Positive for abdominal pain and hematochezia. Negative for diarrhea and melena.   Genitourinary: Positive for dysuria. Negative for hematuria and vaginal bleeding.   All other systems reviewed and are negative.      Past Medical History:   Diagnosis Date   • Abdominal pain    • Anxiety    • Arthritis    • Atrial fibrillation (CMS/HCC)    • C. difficile colitis    • Diarrhea    • GERD (gastroesophageal reflux disease)    • Hyperlipidemia    • Hypertension    • Hypothyroid    • Presbyesophagus    • Sinus congestion    • Thrush        Allergies   Allergen Reactions   • Biaxin [Clarithromycin] Nausea And Vomiting   • Ceftin [Cefuroxime]      Uncertain if true allergy   • Celexa [Citalopram] Dizziness   • Chlorpheniramine Other (See Comments)     tachycardia   • Ciprofloxacin      Uncertain if true allergy   • Crestor [Rosuvastatin] Other (See Comments)     Elevated LFTs   • Doxycycline Nausea And Vomiting   • Effexor [Venlafaxine] Nausea Only   • Fluoxetine Other (See Comments)     Abdominal pain   • Hydrocodone Bitartrate      insomnia   • Imipramine Hcl      Uncertain if true allergy   • Levaquin [Levofloxacin] Diarrhea   • Lexapro [Escitalopram Oxalate] Nausea Only   • Macrobid [Nitrofurantoin  Monohyd Macro]      Uncertain if true allergy   • Methscopolamine      Uncertain if true allergy   • Phenylpropanolamine      Uncertain if true allergy   • Prednisone      Uncertain if true allergy   • Promethazine      Uncertain if true allergy   • Simvastatin      Uncertain if true allergy     • Verapamil      Uncertain if true allergy   • Welchol [Colesevelam Hcl]      Uncertain if true allergy   • Zetia [Ezetimibe]      Uncertain if true allergy     • Zoloft [Sertraline Hcl] Nausea Only   • Penicillins Rash       Past Surgical History:   Procedure Laterality Date   • ABDOMINAL SURGERY     • APPENDECTOMY     • CHOLECYSTECTOMY     • ENDOSCOPY N/A 7/5/2017    Procedure: ESOPHAGOGASTRODUODENOSCOPY WITH DILATION;  Surgeon: Betito Montejo MD;  Location: [facility];  Service:    • HYSTERECTOMY     • KNEE ARTHROSCOPY Right    • THYROIDECTOMY         Family History   Problem Relation Age of Onset   • Heart disease Father    • Cancer Sister    • Cancer Brother        Social History     Socioeconomic History   • Marital status: Unknown     Spouse name: Not on file   • Number of children: Not on file   • Years of education: Not on file   • Highest education level: Not on file   Tobacco Use   • Smoking status: Never Smoker   • Smokeless tobacco: Never Used   Substance and Sexual Activity   • Alcohol use: No   • Drug use: No   • Sexual activity: Defer           Objective   Physical Exam   Constitutional: She is oriented to person, place, and time. She appears well-developed and well-nourished. No distress.   HENT:   Head: Normocephalic and atraumatic.   Mouth/Throat: Oropharynx is clear and moist.   Eyes: Pupils are equal, round, and reactive to light. Conjunctivae and EOM are normal.   Neck: Normal range of motion. Neck supple.   Cardiovascular: Normal rate and regular rhythm. Exam reveals no friction rub.   No murmur heard.  Pulmonary/Chest: Effort normal and breath sounds normal. No stridor. No respiratory  distress.   Abdominal: Soft. Bowel sounds are normal. She exhibits no distension. There is no tenderness. There is no guarding.   Genitourinary:   Genitourinary Comments: hemmorhoid spresent inflamed  No acytive bleed  Heme neg stool   Musculoskeletal: Normal range of motion. She exhibits no edema or tenderness.   Lymphadenopathy:     She has no cervical adenopathy.   Neurological: She is alert and oriented to person, place, and time. No cranial nerve deficit or sensory deficit. Coordination normal.   Skin: Skin is warm. No rash noted. She is not diaphoretic. No erythema.   Psychiatric: She has a normal mood and affect.   Nursing note and vitals reviewed.      Procedures           ED Course      Reeval, appears well w/o complaint, ok with plan to f/u urology                No data recorded                        MDM  Number of Diagnoses or Management Options  Dysuria:   Hematuria, unspecified type:   Lower abdominal pain:      Amount and/or Complexity of Data Reviewed  Clinical lab tests: ordered and reviewed  Tests in the radiology section of CPT®: ordered and reviewed    Risk of Complications, Morbidity, and/or Mortality  Presenting problems: moderate  Diagnostic procedures: moderate  Management options: moderate        Final diagnoses:   Hematuria, unspecified type   Lower abdominal pain   Dysuria              Dank Red MD  12/13/19 0212

## 2020-01-17 ENCOUNTER — APPOINTMENT (OUTPATIENT)
Dept: CT IMAGING | Facility: HOSPITAL | Age: 85
End: 2020-01-17

## 2020-01-17 ENCOUNTER — HOSPITAL ENCOUNTER (INPATIENT)
Facility: HOSPITAL | Age: 85
LOS: 4 days | Discharge: HOME | End: 2020-01-21
Attending: EMERGENCY MEDICINE | Admitting: HOSPITALIST

## 2020-01-17 DIAGNOSIS — K56.609 SMALL BOWEL OBSTRUCTION (HCC): Primary | ICD-10-CM

## 2020-01-17 DIAGNOSIS — K57.92 DIVERTICULITIS: ICD-10-CM

## 2020-01-17 PROBLEM — K92.2 LOWER GI BLEED: Status: RESOLVED | Noted: 2017-04-06 | Resolved: 2020-01-17

## 2020-01-17 PROBLEM — F41.9 ANXIETY DISORDER: Status: ACTIVE | Noted: 2020-01-17

## 2020-01-17 PROBLEM — E03.9 HYPOTHYROIDISM: Status: ACTIVE | Noted: 2020-01-17

## 2020-01-17 PROBLEM — K21.9 GERD WITHOUT ESOPHAGITIS: Status: ACTIVE | Noted: 2020-01-17

## 2020-01-17 PROBLEM — E78.5 HYPERLIPIDEMIA: Status: ACTIVE | Noted: 2020-01-17

## 2020-01-17 PROBLEM — K92.2 ACUTE LOWER GI BLEEDING: Status: RESOLVED | Noted: 2017-04-05 | Resolved: 2020-01-17

## 2020-01-17 PROBLEM — I10 HTN (HYPERTENSION), BENIGN: Status: ACTIVE | Noted: 2020-01-17

## 2020-01-17 LAB
ALBUMIN SERPL-MCNC: 3.9 G/DL (ref 3.5–5.2)
ALBUMIN/GLOB SERPL: 1.1 G/DL
ALP SERPL-CCNC: 75 U/L (ref 39–117)
ALT SERPL W P-5'-P-CCNC: 24 U/L (ref 1–33)
ANION GAP SERPL CALCULATED.3IONS-SCNC: 12.8 MMOL/L (ref 5–15)
AST SERPL-CCNC: 28 U/L (ref 1–32)
BASOPHILS # BLD AUTO: 0.04 10*3/MM3 (ref 0–0.2)
BASOPHILS NFR BLD AUTO: 0.3 % (ref 0–1.5)
BILIRUB SERPL-MCNC: 0.5 MG/DL (ref 0.2–1.2)
BILIRUB UR QL STRIP: NEGATIVE
BUN BLD-MCNC: 17 MG/DL (ref 8–23)
BUN/CREAT SERPL: 19.3 (ref 7–25)
CALCIUM SPEC-SCNC: 9.6 MG/DL (ref 8.6–10.5)
CHLORIDE SERPL-SCNC: 101 MMOL/L (ref 98–107)
CLARITY UR: CLEAR
CO2 SERPL-SCNC: 25.2 MMOL/L (ref 22–29)
COLOR UR: YELLOW
CREAT BLD-MCNC: 0.88 MG/DL (ref 0.57–1)
DEPRECATED RDW RBC AUTO: 46 FL (ref 37–54)
EOSINOPHIL # BLD AUTO: 0.04 10*3/MM3 (ref 0–0.4)
EOSINOPHIL NFR BLD AUTO: 0.3 % (ref 0.3–6.2)
ERYTHROCYTE [DISTWIDTH] IN BLOOD BY AUTOMATED COUNT: 12.9 % (ref 12.3–15.4)
GFR SERPL CREATININE-BSD FRML MDRD: 61 ML/MIN/1.73
GLOBULIN UR ELPH-MCNC: 3.5 GM/DL
GLUCOSE BLD-MCNC: 139 MG/DL (ref 65–99)
GLUCOSE UR STRIP-MCNC: NEGATIVE MG/DL
HCT VFR BLD AUTO: 43.2 % (ref 34–46.6)
HGB BLD-MCNC: 13.9 G/DL (ref 12–15.9)
HGB UR QL STRIP.AUTO: NEGATIVE
IMM GRANULOCYTES # BLD AUTO: 0.06 10*3/MM3 (ref 0–0.05)
IMM GRANULOCYTES NFR BLD AUTO: 0.4 % (ref 0–0.5)
KETONES UR QL STRIP: NEGATIVE
LEUKOCYTE ESTERASE UR QL STRIP.AUTO: NEGATIVE
LIPASE SERPL-CCNC: 16 U/L (ref 13–60)
LYMPHOCYTES # BLD AUTO: 2.52 10*3/MM3 (ref 0.7–3.1)
LYMPHOCYTES NFR BLD AUTO: 17.9 % (ref 19.6–45.3)
MCH RBC QN AUTO: 31.1 PG (ref 26.6–33)
MCHC RBC AUTO-ENTMCNC: 32.2 G/DL (ref 31.5–35.7)
MCV RBC AUTO: 96.6 FL (ref 79–97)
MONOCYTES # BLD AUTO: 0.77 10*3/MM3 (ref 0.1–0.9)
MONOCYTES NFR BLD AUTO: 5.5 % (ref 5–12)
NEUTROPHILS # BLD AUTO: 10.61 10*3/MM3 (ref 1.7–7)
NEUTROPHILS NFR BLD AUTO: 75.6 % (ref 42.7–76)
NITRITE UR QL STRIP: NEGATIVE
NRBC BLD AUTO-RTO: 0 /100 WBC (ref 0–0.2)
PH UR STRIP.AUTO: 6.5 [PH] (ref 4.5–8)
PLATELET # BLD AUTO: 262 10*3/MM3 (ref 140–450)
PMV BLD AUTO: 10.6 FL (ref 6–12)
POTASSIUM BLD-SCNC: 4.3 MMOL/L (ref 3.5–5.2)
PROT SERPL-MCNC: 7.4 G/DL (ref 6–8.5)
PROT UR QL STRIP: NEGATIVE
RBC # BLD AUTO: 4.47 10*6/MM3 (ref 3.77–5.28)
SODIUM BLD-SCNC: 139 MMOL/L (ref 136–145)
SP GR UR STRIP: 1.02 (ref 1–1.03)
UROBILINOGEN UR QL STRIP: NORMAL
WBC NRBC COR # BLD: 14.04 10*3/MM3 (ref 3.4–10.8)

## 2020-01-17 PROCEDURE — 74177 CT ABD & PELVIS W/CONTRAST: CPT

## 2020-01-17 PROCEDURE — 25010000002 LEVOFLOXACIN PER 250 MG: Performed by: EMERGENCY MEDICINE

## 2020-01-17 PROCEDURE — 99222 1ST HOSP IP/OBS MODERATE 55: CPT | Performed by: FAMILY MEDICINE

## 2020-01-17 PROCEDURE — 81003 URINALYSIS AUTO W/O SCOPE: CPT | Performed by: EMERGENCY MEDICINE

## 2020-01-17 PROCEDURE — 25010000002 IOPAMIDOL 61 % SOLUTION: Performed by: EMERGENCY MEDICINE

## 2020-01-17 PROCEDURE — 83690 ASSAY OF LIPASE: CPT | Performed by: EMERGENCY MEDICINE

## 2020-01-17 PROCEDURE — 99284 EMERGENCY DEPT VISIT MOD MDM: CPT

## 2020-01-17 PROCEDURE — 99284 EMERGENCY DEPT VISIT MOD MDM: CPT | Performed by: EMERGENCY MEDICINE

## 2020-01-17 PROCEDURE — 85025 COMPLETE CBC W/AUTO DIFF WBC: CPT | Performed by: EMERGENCY MEDICINE

## 2020-01-17 PROCEDURE — 25010000002 HYDROMORPHONE PER 4 MG: Performed by: EMERGENCY MEDICINE

## 2020-01-17 PROCEDURE — 25010000002 ONDANSETRON PER 1 MG: Performed by: EMERGENCY MEDICINE

## 2020-01-17 PROCEDURE — 80053 COMPREHEN METABOLIC PANEL: CPT | Performed by: EMERGENCY MEDICINE

## 2020-01-17 RX ORDER — ONDANSETRON 2 MG/ML
4 INJECTION INTRAMUSCULAR; INTRAVENOUS ONCE
Status: COMPLETED | OUTPATIENT
Start: 2020-01-17 | End: 2020-01-17

## 2020-01-17 RX ORDER — ACETAMINOPHEN 160 MG/5ML
650 SOLUTION ORAL EVERY 4 HOURS PRN
Status: DISCONTINUED | OUTPATIENT
Start: 2020-01-17 | End: 2020-01-21 | Stop reason: HOSPADM

## 2020-01-17 RX ORDER — SODIUM CHLORIDE 0.9 % (FLUSH) 0.9 %
10 SYRINGE (ML) INJECTION AS NEEDED
Status: DISCONTINUED | OUTPATIENT
Start: 2020-01-17 | End: 2020-01-21 | Stop reason: HOSPADM

## 2020-01-17 RX ORDER — SODIUM CHLORIDE 0.9 % (FLUSH) 0.9 %
1-10 SYRINGE (ML) INJECTION AS NEEDED
Status: DISCONTINUED | OUTPATIENT
Start: 2020-01-17 | End: 2020-01-21 | Stop reason: HOSPADM

## 2020-01-17 RX ORDER — ACETAMINOPHEN 650 MG/1
650 SUPPOSITORY RECTAL EVERY 4 HOURS PRN
Status: DISCONTINUED | OUTPATIENT
Start: 2020-01-17 | End: 2020-01-21 | Stop reason: HOSPADM

## 2020-01-17 RX ORDER — HYDROMORPHONE HCL 110MG/55ML
0.5 PATIENT CONTROLLED ANALGESIA SYRINGE INTRAVENOUS ONCE
Status: COMPLETED | OUTPATIENT
Start: 2020-01-17 | End: 2020-01-17

## 2020-01-17 RX ORDER — SODIUM CHLORIDE, SODIUM LACTATE, POTASSIUM CHLORIDE, CALCIUM CHLORIDE 600; 310; 30; 20 MG/100ML; MG/100ML; MG/100ML; MG/100ML
100 INJECTION, SOLUTION INTRAVENOUS CONTINUOUS
Status: DISCONTINUED | OUTPATIENT
Start: 2020-01-17 | End: 2020-01-20

## 2020-01-17 RX ORDER — NITROGLYCERIN 0.4 MG/1
0.4 TABLET SUBLINGUAL
Status: DISCONTINUED | OUTPATIENT
Start: 2020-01-17 | End: 2020-01-21 | Stop reason: HOSPADM

## 2020-01-17 RX ORDER — PANTOPRAZOLE SODIUM 40 MG/10ML
40 INJECTION, POWDER, LYOPHILIZED, FOR SOLUTION INTRAVENOUS ONCE
Status: COMPLETED | OUTPATIENT
Start: 2020-01-17 | End: 2020-01-17

## 2020-01-17 RX ORDER — ACETAMINOPHEN 325 MG/1
650 TABLET ORAL EVERY 4 HOURS PRN
Status: DISCONTINUED | OUTPATIENT
Start: 2020-01-17 | End: 2020-01-21 | Stop reason: HOSPADM

## 2020-01-17 RX ORDER — ONDANSETRON 2 MG/ML
4 INJECTION INTRAMUSCULAR; INTRAVENOUS EVERY 6 HOURS PRN
Status: DISCONTINUED | OUTPATIENT
Start: 2020-01-17 | End: 2020-01-21 | Stop reason: HOSPADM

## 2020-01-17 RX ORDER — SODIUM CHLORIDE 9 MG/ML
40 INJECTION, SOLUTION INTRAVENOUS AS NEEDED
Status: DISCONTINUED | OUTPATIENT
Start: 2020-01-17 | End: 2020-01-21 | Stop reason: HOSPADM

## 2020-01-17 RX ORDER — LEVOFLOXACIN 5 MG/ML
750 INJECTION, SOLUTION INTRAVENOUS ONCE
Status: COMPLETED | OUTPATIENT
Start: 2020-01-17 | End: 2020-01-17

## 2020-01-17 RX ORDER — FAMOTIDINE 10 MG/ML
20 INJECTION, SOLUTION INTRAVENOUS DAILY
Status: DISCONTINUED | OUTPATIENT
Start: 2020-01-18 | End: 2020-01-21 | Stop reason: HOSPADM

## 2020-01-17 RX ORDER — CALCIUM CARBONATE 200(500)MG
1 TABLET,CHEWABLE ORAL 2 TIMES DAILY PRN
Status: DISCONTINUED | OUTPATIENT
Start: 2020-01-17 | End: 2020-01-21 | Stop reason: HOSPADM

## 2020-01-17 RX ORDER — ONDANSETRON 4 MG/1
4 TABLET, FILM COATED ORAL EVERY 6 HOURS PRN
Status: DISCONTINUED | OUTPATIENT
Start: 2020-01-17 | End: 2020-01-21 | Stop reason: HOSPADM

## 2020-01-17 RX ORDER — SODIUM CHLORIDE 0.9 % (FLUSH) 0.9 %
10 SYRINGE (ML) INJECTION EVERY 12 HOURS SCHEDULED
Status: DISCONTINUED | OUTPATIENT
Start: 2020-01-17 | End: 2020-01-21 | Stop reason: HOSPADM

## 2020-01-17 RX ORDER — LEVOFLOXACIN 5 MG/ML
500 INJECTION, SOLUTION INTRAVENOUS EVERY 24 HOURS
Status: DISCONTINUED | OUTPATIENT
Start: 2020-01-18 | End: 2020-01-21 | Stop reason: HOSPADM

## 2020-01-17 RX ADMIN — SODIUM CHLORIDE, POTASSIUM CHLORIDE, SODIUM LACTATE AND CALCIUM CHLORIDE 100 ML/HR: 600; 310; 30; 20 INJECTION, SOLUTION INTRAVENOUS at 20:47

## 2020-01-17 RX ADMIN — HYDROMORPHONE HYDROCHLORIDE 0.5 MG: 2 INJECTION, SOLUTION INTRAMUSCULAR; INTRAVENOUS; SUBCUTANEOUS at 18:29

## 2020-01-17 RX ADMIN — PANTOPRAZOLE SODIUM 40 MG: 40 INJECTION, POWDER, FOR SOLUTION INTRAVENOUS at 18:31

## 2020-01-17 RX ADMIN — LEVOFLOXACIN 750 MG: 5 INJECTION, SOLUTION INTRAVENOUS at 18:34

## 2020-01-17 RX ADMIN — IOPAMIDOL 100 ML: 612 INJECTION, SOLUTION INTRAVENOUS at 17:25

## 2020-01-17 RX ADMIN — ONDANSETRON 4 MG: 2 INJECTION, SOLUTION INTRAMUSCULAR; INTRAVENOUS at 16:39

## 2020-01-17 NOTE — ED PROVIDER NOTES
Subjective   History of Present Illness  History of Present Illness    Chief complaint: Abdominal pain    Location: Mid abdomen    Quality/Severity: 7/10 at its worst, 7/10 currently, const    Timing/Onset/Duration: Pain started at 3 AM, sudden onset    Modifying Factors: Diarrhea improved after taking antidiarrheal this morning.    Associated Symptoms: No headache.  No fever chills.  The patient had a scratchy throat and cough, she had bronchitis 3 weeks ago, the patient denies any chest pain or shortness of breath.  The patient has had yellow emesis, a few times, nonbloody, patient has had one episode of nonbloody diarrhea this morning.  Patient does complain of burning on urination    Narrative: This 89-year-old white female presents with abdominal pain in the periumbilical region.  She has had vomiting and diarrhea.  She took Mirna-[place]wood, gas relief, and over-the-counter antidiarrheal this morning.  The diarrhea appears to have resolved.  The patient denies blood in the diarrhea.  The patient has a history of C. difficile in the past.  The patient completed a Z-Osorio 2 weeks ago.  She has not been around any by who is been sick.  She has had a cholecystectomy and a hysterectomy.  She has had knee surgery but is unsure which side.    PCP: Dr. Freedman in [place]      Review of Systems   Constitutional: Negative for chills and fever.   HENT: Positive for sore throat. Negative for ear pain.    Respiratory: Positive for cough. Negative for chest tightness and shortness of breath.    Cardiovascular: Negative for chest pain, palpitations and leg swelling.   Gastrointestinal: Positive for abdominal pain, diarrhea and nausea. Negative for blood in stool, constipation and vomiting.   Genitourinary: Positive for dysuria. Negative for difficulty urinating, flank pain, hematuria and urgency.   Musculoskeletal: Negative for back pain.   Skin: Negative for rash and wound.   Neurological: Negative for headaches.    Psychiatric/Behavioral: Negative.  Negative for confusion.        Medication List      ASK your doctor about these medications    aspirin 81 MG chewable tablet     benzonatate 100 MG capsule  Commonly known as:  TESSALON     CALCIUM 500 + D PO     CENTRUM SILVER PO     clotrimazole-betamethasone 1-0.05 % cream  Commonly known as:  LOTRISONE     dicyclomine 10 MG capsule  Commonly known as:  BENTYL     dilTIAZem 30 MG tablet  Commonly known as:  CARDIZEM     fluticasone 50 MCG/ACT nasal spray  Commonly known as:  FLONASE     guaiFENesin 600 MG 12 hr tablet  Commonly known as:  MUCINEX     hydrocortisone-pramoxine 1-1 % rectal foam  Commonly known as:  PROCTOFOAM-HS  Insert 1 applicator into the rectum 2 (Two) Times a Day.     KLOR-CON 10 PO     levocetirizine 5 MG tablet  Commonly known as:  XYZAL     lisinopril 20 MG tablet  Commonly known as:  PRINIVIL,ZESTRIL  Take 1 tablet by mouth Daily. Take one half tablet daily for 1 week then   advanced to one full tablet daily     LORazepam 1 MG tablet  Commonly known as:  ATIVAN     meclizine 12.5 MG tablet  Commonly known as:  ANTIVERT     pantoprazole 40 MG EC tablet  Commonly known as:  PROTONIX     simethicone 125 MG chewable tablet  Commonly known as:  MYLICON     SYNTHROID 50 MCG tablet  Generic drug:  levothyroxine     ZANTAC 150 MG tablet  Generic drug:  raNITIdine          Past Medical History:   Diagnosis Date   • Abdominal pain    • Anxiety    • Arthritis    • Atrial fibrillation (CMS/HCC)    • C. difficile colitis    • Diarrhea    • GERD (gastroesophageal reflux disease)    • Hyperlipidemia    • Hypertension    • Hypothyroid    • Presbyesophagus    • Sinus congestion    • Thrush        Allergies   Allergen Reactions   • Biaxin [Clarithromycin] Nausea And Vomiting   • Ceftin [Cefuroxime]      Uncertain if true allergy   • Celexa [Citalopram] Dizziness   • Chlorpheniramine Other (See Comments)     tachycardia   • Ciprofloxacin      Uncertain if true allergy   •  Crestor [Rosuvastatin] Other (See Comments)     Elevated LFTs   • Doxycycline Nausea And Vomiting   • Effexor [Venlafaxine] Nausea Only   • Fluoxetine Other (See Comments)     Abdominal pain   • Hydrocodone Bitartrate      insomnia   • Imipramine Hcl      Uncertain if true allergy   • Levaquin [Levofloxacin] Diarrhea   • Lexapro [Escitalopram Oxalate] Nausea Only   • Macrobid [Nitrofurantoin Monohyd Macro]      Uncertain if true allergy   • Methscopolamine      Uncertain if true allergy   • Phenylpropanolamine      Uncertain if true allergy   • Prednisone      Uncertain if true allergy   • Promethazine      Uncertain if true allergy   • Simvastatin      Uncertain if true allergy     • Verapamil      Uncertain if true allergy   • Welchol [Colesevelam Hcl]      Uncertain if true allergy   • Zetia [Ezetimibe]      Uncertain if true allergy     • Zoloft [Sertraline Hcl] Nausea Only   • Penicillins Rash       Past Surgical History:   Procedure Laterality Date   • ABDOMINAL SURGERY     • APPENDECTOMY     • CHOLECYSTECTOMY     • ENDOSCOPY N/A 7/5/2017    Procedure: ESOPHAGOGASTRODUODENOSCOPY WITH DILATION;  Surgeon: Betito Montejo MD;  Location: [facility];  Service:    • HYSTERECTOMY     • KNEE ARTHROSCOPY Right    • THYROIDECTOMY         Family History   Problem Relation Age of Onset   • Heart disease Father    • Cancer Sister    • Cancer Brother        Social History     Socioeconomic History   • Marital status: Unknown     Spouse name: Not on file   • Number of children: Not on file   • Years of education: Not on file   • Highest education level: Not on file   Tobacco Use   • Smoking status: Never Smoker   • Smokeless tobacco: Never Used   Substance and Sexual Activity   • Alcohol use: No   • Drug use: No   • Sexual activity: Defer           Objective   Physical Exam   Constitutional: She is oriented to person, place, and time. She appears well-developed and well-nourished. No distress.   ED Triage Vitals  (01/17/20 1604)  Temp: 97.8 °F (36.6 °C)  Heart Rate: 88  Resp: 18  BP: 152/78  SpO2: 97 %  Temp src: Oral  Heart Rate Source: Monitor  Patient Position: Sitting  BP Location: Right arm  FiO2 (%): n/a    The patient's vitals were reviewed by me.  Unless otherwise noted they are within normal limits.     HENT:   Head: Normocephalic and atraumatic.   Right Ear: External ear normal.   Left Ear: External ear normal.   Nose: Nose normal.   Mouth/Throat: Oropharynx is clear and moist. No oropharyngeal exudate.   Eyes: Pupils are equal, round, and reactive to light. Conjunctivae and EOM are normal. Right eye exhibits no discharge. Left eye exhibits no discharge. No scleral icterus.   Neck: Normal range of motion. Neck supple. No JVD present. No tracheal deviation present. No thyromegaly present.   Cardiovascular: Normal rate, regular rhythm, normal heart sounds and intact distal pulses. Exam reveals no gallop and no friction rub.   No murmur heard.  Pulmonary/Chest: Effort normal and breath sounds normal. No stridor. No respiratory distress. She has no wheezes. She has no rales. She exhibits no tenderness.   Abdominal: Soft. Bowel sounds are normal. She exhibits no distension and no mass. Tenderness: Moderate periumbilical tenderness. There is no rebound and no guarding. No hernia.   Musculoskeletal: Normal range of motion. She exhibits no edema or deformity.   Lymphadenopathy:     She has no cervical adenopathy.   Neurological: She is alert and oriented to person, place, and time.   Skin: Skin is warm and dry. No rash noted. She is not diaphoretic. No erythema. No pallor.   Psychiatric: Her behavior is normal.   Nursing note and vitals reviewed.      Procedures           ED Course  ED Course as of Jan 17 1720 Fri Jan 17, 2020   1702 The laboratory values were reviewed by me.  The serum glucose is 139.  The white blood cell count is 14,000.  The laboratory values are otherwise unremarkable.  The GI panel is pending.     [RC]      ED Course User Index  [RC] Boyd Cox MD      6:00 PM, 01/17/20:  I spoke with Dr. Sinha, on-call for general surgery, she will consult on the patient.  She is asked that the patient be admitted to the hospitalist.  He would like an NG tube placed and a kub tomorrow morning.  She would like the patient to get Flagyl and Levaquin.  The only allergic reaction listed for Levaquin for the patient is diarrhea.  No listed allergy for Flagyl.    6:01 PM, 01/17/20:  The patient was reassessed.  She has no new complaints.  Her vital signs are reviewed and are stable.  Abdominal exam: Soft, moderate mid abdominal pain, no rebound, no guarding, no masses, positive bowel sounds.    6:13 PM, 01/17/20:  The case was discussed with Dr. Juarez, on-call for the hospitalist, she will admit the patient.    6:15 PM, 01/17/20:  The patient's diagnosis of diverticulitis and mechanical small bowel obstruction was discussed with her.  The patient will have an NG tube placed and be placed on antibiotics.  The patient be given pain medication here in the emergency department.  She will be given Protonix.  She will be admitted for further work-up and evaluation and is in stable condition.  All the patient's and family's questions were answered the patient will be admitted in stable condition.                                         MDM  No orders to display     Labs Reviewed - No data to display  No results found.    Final diagnoses:   Small bowel obstruction (CMS/HCC)   Diverticulitis         ED Medications:  Medications - No data to display    New Medications:     Medication List      ASK your doctor about these medications    aspirin 81 MG chewable tablet     benzonatate 100 MG capsule  Commonly known as:  TESSALON     CALCIUM 500 + D PO     CENTRUM SILVER PO     clotrimazole-betamethasone 1-0.05 % cream  Commonly known as:  LOTRISONE     dicyclomine 10 MG capsule  Commonly known as:  BENTYL     dilTIAZem 30 MG  tablet  Commonly known as:  CARDIZEM     fluticasone 50 MCG/ACT nasal spray  Commonly known as:  FLONASE     guaiFENesin 600 MG 12 hr tablet  Commonly known as:  MUCINEX     hydrocortisone-pramoxine 1-1 % rectal foam  Commonly known as:  PROCTOFOAM-HS  Insert 1 applicator into the rectum 2 (Two) Times a Day.     KLOR-CON 10 PO     levocetirizine 5 MG tablet  Commonly known as:  XYZAL     lisinopril 20 MG tablet  Commonly known as:  PRINIVIL,ZESTRIL  Take 1 tablet by mouth Daily. Take one half tablet daily for 1 week then   advanced to one full tablet daily     LORazepam 1 MG tablet  Commonly known as:  ATIVAN     meclizine 12.5 MG tablet  Commonly known as:  ANTIVERT     pantoprazole 40 MG EC tablet  Commonly known as:  PROTONIX     simethicone 125 MG chewable tablet  Commonly known as:  MYLICON     SYNTHROID 50 MCG tablet  Generic drug:  levothyroxine     ZANTAC 150 MG tablet  Generic drug:  raNITIdine          Stopped Medications:     Medication List      ASK your doctor about these medications    aspirin 81 MG chewable tablet     benzonatate 100 MG capsule  Commonly known as:  TESSALON     CALCIUM 500 + D PO     CENTRUM SILVER PO     clotrimazole-betamethasone 1-0.05 % cream  Commonly known as:  LOTRISONE     dicyclomine 10 MG capsule  Commonly known as:  BENTYL     dilTIAZem 30 MG tablet  Commonly known as:  CARDIZEM     fluticasone 50 MCG/ACT nasal spray  Commonly known as:  FLONASE     guaiFENesin 600 MG 12 hr tablet  Commonly known as:  MUCINEX     hydrocortisone-pramoxine 1-1 % rectal foam  Commonly known as:  PROCTOFOAM-HS  Insert 1 applicator into the rectum 2 (Two) Times a Day.     KLOR-CON 10 PO     levocetirizine 5 MG tablet  Commonly known as:  XYZAL     lisinopril 20 MG tablet  Commonly known as:  PRINIVIL,ZESTRIL  Take 1 tablet by mouth Daily. Take one half tablet daily for 1 week then   advanced to one full tablet daily     LORazepam 1 MG tablet  Commonly known as:  ATIVAN     meclizine 12.5 MG  tablet  Commonly known as:  ANTIVERT     pantoprazole 40 MG EC tablet  Commonly known as:  PROTONIX     simethicone 125 MG chewable tablet  Commonly known as:  MYLICON     SYNTHROID 50 MCG tablet  Generic drug:  levothyroxine     ZANTAC 150 MG tablet  Generic drug:  raNITIdine            Final diagnoses:   Small bowel obstruction (CMS/HCC)   Diverticulitis            Boyd Cox MD  01/17/20 5814

## 2020-01-18 ENCOUNTER — APPOINTMENT (OUTPATIENT)
Dept: GENERAL RADIOLOGY | Facility: HOSPITAL | Age: 85
End: 2020-01-18

## 2020-01-18 LAB
ANION GAP SERPL CALCULATED.3IONS-SCNC: 11 MMOL/L (ref 5–15)
BASOPHILS # BLD AUTO: 0.01 10*3/MM3 (ref 0–0.2)
BASOPHILS NFR BLD AUTO: 0.1 % (ref 0–1.5)
BUN BLD-MCNC: 16 MG/DL (ref 8–23)
BUN/CREAT SERPL: 19.3 (ref 7–25)
CALCIUM SPEC-SCNC: 8.6 MG/DL (ref 8.6–10.5)
CHLORIDE SERPL-SCNC: 101 MMOL/L (ref 98–107)
CO2 SERPL-SCNC: 25 MMOL/L (ref 22–29)
CREAT BLD-MCNC: 0.83 MG/DL (ref 0.57–1)
DEPRECATED RDW RBC AUTO: 48.2 FL (ref 37–54)
EOSINOPHIL # BLD AUTO: 0.01 10*3/MM3 (ref 0–0.4)
EOSINOPHIL NFR BLD AUTO: 0.1 % (ref 0.3–6.2)
ERYTHROCYTE [DISTWIDTH] IN BLOOD BY AUTOMATED COUNT: 13 % (ref 12.3–15.4)
GFR SERPL CREATININE-BSD FRML MDRD: 65 ML/MIN/1.73
GLUCOSE BLD-MCNC: 113 MG/DL (ref 65–99)
HCT VFR BLD AUTO: 40.7 % (ref 34–46.6)
HGB BLD-MCNC: 12.6 G/DL (ref 12–15.9)
IMM GRANULOCYTES # BLD AUTO: 0.01 10*3/MM3 (ref 0–0.05)
IMM GRANULOCYTES NFR BLD AUTO: 0.1 % (ref 0–0.5)
LYMPHOCYTES # BLD AUTO: 2 10*3/MM3 (ref 0.7–3.1)
LYMPHOCYTES NFR BLD AUTO: 18.8 % (ref 19.6–45.3)
MCH RBC QN AUTO: 30.7 PG (ref 26.6–33)
MCHC RBC AUTO-ENTMCNC: 31 G/DL (ref 31.5–35.7)
MCV RBC AUTO: 99.3 FL (ref 79–97)
MONOCYTES # BLD AUTO: 0.91 10*3/MM3 (ref 0.1–0.9)
MONOCYTES NFR BLD AUTO: 8.6 % (ref 5–12)
NEUTROPHILS # BLD AUTO: 7.68 10*3/MM3 (ref 1.7–7)
NEUTROPHILS NFR BLD AUTO: 72.3 % (ref 42.7–76)
PLATELET # BLD AUTO: 217 10*3/MM3 (ref 140–450)
PMV BLD AUTO: 12.2 FL (ref 6–12)
POTASSIUM BLD-SCNC: 4.3 MMOL/L (ref 3.5–5.2)
RBC # BLD AUTO: 4.1 10*6/MM3 (ref 3.77–5.28)
SODIUM BLD-SCNC: 137 MMOL/L (ref 136–145)
WBC NRBC COR # BLD: 10.62 10*3/MM3 (ref 3.4–10.8)

## 2020-01-18 PROCEDURE — 85025 COMPLETE CBC W/AUTO DIFF WBC: CPT | Performed by: SURGERY

## 2020-01-18 PROCEDURE — 25010000002 LEVOFLOXACIN PER 250 MG: Performed by: FAMILY MEDICINE

## 2020-01-18 PROCEDURE — 99221 1ST HOSP IP/OBS SF/LOW 40: CPT | Performed by: SURGERY

## 2020-01-18 PROCEDURE — 93005 ELECTROCARDIOGRAM TRACING: CPT | Performed by: HOSPITALIST

## 2020-01-18 PROCEDURE — 74018 RADEX ABDOMEN 1 VIEW: CPT

## 2020-01-18 PROCEDURE — 93010 ELECTROCARDIOGRAM REPORT: CPT | Performed by: INTERNAL MEDICINE

## 2020-01-18 PROCEDURE — 99231 SBSQ HOSP IP/OBS SF/LOW 25: CPT | Performed by: HOSPITALIST

## 2020-01-18 PROCEDURE — 25010000002 ONDANSETRON PER 1 MG: Performed by: FAMILY MEDICINE

## 2020-01-18 PROCEDURE — 80048 BASIC METABOLIC PNL TOTAL CA: CPT | Performed by: FAMILY MEDICINE

## 2020-01-18 PROCEDURE — 84484 ASSAY OF TROPONIN QUANT: CPT | Performed by: FAMILY MEDICINE

## 2020-01-18 RX ORDER — DORZOLAMIDE HCL 20 MG/ML
1 SOLUTION/ DROPS OPHTHALMIC 2 TIMES DAILY
COMMUNITY

## 2020-01-18 RX ORDER — DORZOLAMIDE HCL 20 MG/ML
1 SOLUTION/ DROPS OPHTHALMIC 2 TIMES DAILY
Status: DISCONTINUED | OUTPATIENT
Start: 2020-01-18 | End: 2020-01-21 | Stop reason: HOSPADM

## 2020-01-18 RX ORDER — LATANOPROST 50 UG/ML
1 SOLUTION/ DROPS OPHTHALMIC NIGHTLY
Status: DISCONTINUED | OUTPATIENT
Start: 2020-01-18 | End: 2020-01-18

## 2020-01-18 RX ORDER — DORZOLAMIDE HCL 20 MG/ML
1 SOLUTION/ DROPS OPHTHALMIC 2 TIMES DAILY
Status: DISCONTINUED | OUTPATIENT
Start: 2020-01-18 | End: 2020-01-18

## 2020-01-18 RX ORDER — LATANOPROST 50 UG/ML
1 SOLUTION/ DROPS OPHTHALMIC NIGHTLY
COMMUNITY

## 2020-01-18 RX ORDER — LATANOPROST 50 UG/ML
1 SOLUTION/ DROPS OPHTHALMIC NIGHTLY
Status: DISCONTINUED | OUTPATIENT
Start: 2020-01-18 | End: 2020-01-21 | Stop reason: HOSPADM

## 2020-01-18 RX ORDER — HYDRALAZINE HYDROCHLORIDE 20 MG/ML
10 INJECTION INTRAMUSCULAR; INTRAVENOUS EVERY 4 HOURS PRN
Status: DISCONTINUED | OUTPATIENT
Start: 2020-01-18 | End: 2020-01-21 | Stop reason: HOSPADM

## 2020-01-18 RX ORDER — BRIMONIDINE TARTRATE 2 MG/ML
1 SOLUTION/ DROPS OPHTHALMIC 2 TIMES DAILY
Status: DISCONTINUED | OUTPATIENT
Start: 2020-01-18 | End: 2020-01-21 | Stop reason: HOSPADM

## 2020-01-18 RX ORDER — TIMOLOL MALEATE 5 MG/ML
1 SOLUTION/ DROPS OPHTHALMIC EVERY 12 HOURS SCHEDULED
Status: DISCONTINUED | OUTPATIENT
Start: 2020-01-18 | End: 2020-01-18

## 2020-01-18 RX ORDER — TIMOLOL MALEATE 5 MG/ML
1 SOLUTION/ DROPS OPHTHALMIC EVERY 12 HOURS SCHEDULED
Status: DISCONTINUED | OUTPATIENT
Start: 2020-01-18 | End: 2020-01-21 | Stop reason: HOSPADM

## 2020-01-18 RX ORDER — TIMOLOL MALEATE 5 MG/ML
1 SOLUTION/ DROPS OPHTHALMIC 2 TIMES DAILY
COMMUNITY

## 2020-01-18 RX ADMIN — METRONIDAZOLE 500 MG: 500 INJECTION, SOLUTION INTRAVENOUS at 18:25

## 2020-01-18 RX ADMIN — LATANOPROST 1 DROP: 50 SOLUTION/ DROPS OPHTHALMIC at 20:21

## 2020-01-18 RX ADMIN — PHENOL 2 SPRAY: 1.5 LIQUID ORAL at 18:32

## 2020-01-18 RX ADMIN — TIMOLOL MALEATE 1 DROP: 5 SOLUTION OPHTHALMIC at 11:57

## 2020-01-18 RX ADMIN — BRIMONIDINE TARTRATE 1 DROP: 2 SOLUTION OPHTHALMIC at 20:20

## 2020-01-18 RX ADMIN — TIMOLOL MALEATE 1 DROP: 5 SOLUTION OPHTHALMIC at 20:21

## 2020-01-18 RX ADMIN — DORZOLAMIDE HCL 1 DROP: 20 SOLUTION/ DROPS OPHTHALMIC at 20:20

## 2020-01-18 RX ADMIN — PHENOL 2 SPRAY: 1.5 LIQUID ORAL at 15:48

## 2020-01-18 RX ADMIN — ONDANSETRON 4 MG: 2 INJECTION, SOLUTION INTRAMUSCULAR; INTRAVENOUS at 23:26

## 2020-01-18 RX ADMIN — DORZOLAMIDE HCL 1 DROP: 20 SOLUTION/ DROPS OPHTHALMIC at 11:57

## 2020-01-18 RX ADMIN — LEVOFLOXACIN 500 MG: 5 INJECTION, SOLUTION INTRAVENOUS at 20:18

## 2020-01-18 RX ADMIN — METRONIDAZOLE 500 MG: 500 INJECTION, SOLUTION INTRAVENOUS at 09:01

## 2020-01-18 RX ADMIN — SODIUM CHLORIDE, PRESERVATIVE FREE 10 ML: 5 INJECTION INTRAVENOUS at 20:21

## 2020-01-18 RX ADMIN — METRONIDAZOLE 500 MG: 500 INJECTION, SOLUTION INTRAVENOUS at 02:44

## 2020-01-18 RX ADMIN — SODIUM CHLORIDE, POTASSIUM CHLORIDE, SODIUM LACTATE AND CALCIUM CHLORIDE 100 ML/HR: 600; 310; 30; 20 INJECTION, SOLUTION INTRAVENOUS at 08:58

## 2020-01-18 RX ADMIN — SODIUM CHLORIDE, PRESERVATIVE FREE 10 ML: 5 INJECTION INTRAVENOUS at 08:20

## 2020-01-18 RX ADMIN — FAMOTIDINE 20 MG: 10 INJECTION INTRAVENOUS at 08:20

## 2020-01-18 RX ADMIN — SODIUM CHLORIDE, POTASSIUM CHLORIDE, SODIUM LACTATE AND CALCIUM CHLORIDE 100 ML/HR: 600; 310; 30; 20 INJECTION, SOLUTION INTRAVENOUS at 18:34

## 2020-01-18 NOTE — PLAN OF CARE
Continued Stay Note  PAULO Pillai     Patient Name: Paulo Hampton  MRN: [id number]  Today's Date: 1/18/2020    Admit Date: 1/17/2020    Discharge Plan       Row Name 01/18/20 1351       Plan    Plan  Home when stable    Provided post acute provider list?  N/A    N/A Provider List Comment  No needs at the present time    Patient/Family in Agreement with Plan  yes    Plan Comments  Spoke with Mrs Hampton and her grandson (with permission )  at bedside.  She is very hard of hearing so he answered most of the questions.  Facesheet verified.  She lives alone [facility] in [place].  The grandson lives next door and the granddaughter lives a short distance away.  She does not have any DME, oxygen or home health.  She is independent with her ADL's.  Her pharmacy is [facility] in [place].  She has an advanced directive on file here at the hospital.  Plan is home when stable.  Will continue to follow            Discharge Codes    No documentation.               Danielle Soria RN

## 2020-01-18 NOTE — CONSULTS
General Surgery      Patient Care Team:  Laci Freedman MD as PCP - General (Family Medicine)    CHIEF COMPLAINT: opinion regarding small bowel obstruction    HISTORY OF PRESENT ILLNESS:    This very pleasant 89-year-old white female was in her usual state of health until yesterday morning when she woke up with some abdominal pain and nausea.  Her pain was characterized as crampy periumbilical and upper abdominal pain followed by one bout of emesis and nonbloody diarrhea.  Patient took Imodium and Mirna-Arthur and felt better but then started having abdominal discomfort again.  Patient states her pain persisted and was intermittent throughout the day and therefore she decided come to emergency room.  She had no further diarrhea but she has felt bloated and has noticed increasing abdominal distention.  She was found to have an elevated WBC of 14 and a CT scan showed diverticulitis and small bowel distension which was concerning for a small bowel obstruction.  She had an NG tube placed, IV fluids were started, and she was started on Levaquin and Flagyl.  This morning her pain is better and her abdomen is no longer distended.  She said she is no longer nauseated.  She has no passed flatus but feels like she might.  She says she is not hungry.      Past Medical History:   Diagnosis Date   • Abdominal pain    • Anxiety    • Arthritis    • Atrial fibrillation (CMS/HCC)    • C. difficile colitis    • Diarrhea    • GERD (gastroesophageal reflux disease)    • Hyperlipidemia    • Hypertension    • Hypothyroid    • Presbyesophagus    • Sinus congestion    • Thrush      Past Surgical History:   Procedure Laterality Date   • ABDOMINAL SURGERY     • APPENDECTOMY     • CHOLECYSTECTOMY     • ENDOSCOPY N/A 7/5/2017    Procedure: ESOPHAGOGASTRODUODENOSCOPY WITH DILATION;  Surgeon: Betito Montejo MD;  Location: [facility];  Service:    • HYSTERECTOMY     • KNEE ARTHROSCOPY Right    • THYROIDECTOMY       Family History      Problem Relation Age of Onset   • Heart disease Father    • Cancer Sister    • Cancer Brother      Social History     Tobacco Use   • Smoking status: Never Smoker   • Smokeless tobacco: Never Used   Substance Use Topics   • Alcohol use: No   • Drug use: No     Medications Prior to Admission   Medication Sig Dispense Refill Last Dose   • aspirin 81 MG chewable tablet Chew 81 mg Daily.   1/17/2020 at Unknown time   • benzonatate (TESSALON) 100 MG capsule Take 100 mg by mouth 3 (Three) Times a Day As Needed for Cough.   1/17/2020 at Unknown time   • levothyroxine (SYNTHROID) 50 MCG tablet Take 50 mcg by mouth Daily.   1/17/2020 at Unknown time   • LORazepam (ATIVAN) 1 MG tablet Take 1 mg by mouth 3 (Three) Times a Day. Takes 1 in AM; 2 at HS.  Per daughter in law pt PMD told pt she can take an extra at 5pm   1/17/2020 at Unknown time   • Calcium Carbonate-Vitamin D (CALCIUM 500 + D PO) Take 1 tablet by mouth 2 (Two) Times a Day.   12/13/2019 at 0700   • clotrimazole-betamethasone (LOTRISONE) 1-0.05 % cream Apply 1 application topically 2 (Two) Times a Day As Needed (HEMMROIDS).   12/12/2019 at 2030   • dicyclomine (BENTYL) 10 MG capsule Take 10 mg by mouth 4 (Four) Times a Day Before Meals & at Bedtime As Needed (ABDOMINAL PAIN).   12/13/2019 at 0700   • diltiaZEM (CARDIZEM) 30 MG tablet Take 30 mg by mouth 2 (Two) Times a Day.   12/13/2019 at 0700   • fluticasone (FLONASE) 50 MCG/ACT nasal spray 2 sprays into each nostril Daily.   12/12/2019 at 2030   • guaiFENesin (MUCINEX) 600 MG 12 hr tablet Take 600 mg by mouth Every 12 (Twelve) Hours As Needed for Cough.   Past Month at Unknown time   • hydrocortisone-pramoxine (PROCTOFOAM-HS) 1-1 % rectal foam Insert 1 applicator into the rectum 2 (Two) Times a Day. 10 g 0 Unknown at Unknown time   • levocetirizine (XYZAL) 5 MG tablet Take 5 mg by mouth Every Evening.   12/12/2019 at 2030   • lisinopril (PRINIVIL,ZESTRIL) 20 MG tablet Take 1 tablet by mouth Daily. Take one  "half tablet daily for 1 week then advanced to one full tablet daily 30 tablet 0 12/13/2019 at 0700   • meclizine (ANTIVERT) 12.5 MG tablet Take 12.5 mg by mouth 3 (Three) Times a Day As Needed for dizziness.   Unknown at Unknown time   • Multiple Vitamins-Minerals (CENTRUM SILVER PO) Take 1 tablet by mouth Daily.   12/13/2019 at 0700   • pantoprazole (PROTONIX) 40 MG EC tablet Take 40 mg by mouth Daily.   12/13/2019 at 0700   • Potassium Chloride (KLOR-CON 10 PO) Take 10 mEq by mouth Daily.   12/13/2019 at 0700   • raNITIdine (ZANTAC) 150 MG tablet Take 150 mg by mouth 2 (Two) Times a Day.   Unknown at Unknown time   • simethicone (MYLICON) 125 MG chewable tablet Chew 125 mg Every 6 (Six) Hours As Needed for Flatulence.   12/10/2019 at unkn     Allergies:  Biaxin [clarithromycin]; Ceftin [cefuroxime]; Celexa [citalopram]; Chlorpheniramine; Ciprofloxacin; Crestor [rosuvastatin]; Doxycycline; Effexor [venlafaxine]; Fluoxetine; Hydrocodone bitartrate; Imipramine hcl; Levaquin [levofloxacin]; Lexapro [escitalopram oxalate]; Macrobid [nitrofurantoin monohyd macro]; Methscopolamine; Phenylpropanolamine; Prednisone; Promethazine; Simvastatin; Verapamil; Welchol [colesevelam hcl]; Zetia [ezetimibe]; Zoloft [sertraline hcl]; and Penicillins    REVIEW OF SYSTEMS:  Please see the above history of present illness for pertinent positives and negatives.  The remainder of the patient's systems have been reviewed and are negative.     Vital Signs  Temp:  [97.8 °F (36.6 °C)-98 °F (36.7 °C)] 98 °F (36.7 °C)  Heart Rate:  [80-91] 80  Resp:  [16-18] 16  BP: (138-197)/(75-88) 168/75    Flowsheet Rows      First Filed Value   Admission Height  139.7 cm (55\") Documented at 01/17/2020 1604   Admission Weight  65.8 kg (145 lb) Documented at 01/17/2020 1604           Physical Exam:  Physical Exam   Constitutional: Patient appears well-developed and well-nourished and in no acute distress   HEENT:   Head: Normocephalic and atraumatic.   Mouth " and Throat: Patient has moist mucous membranes. Oropharynx is clear of any erythema or exudate.     Neck: Neck supple. No JVD present. No thyromegaly present. No lymphadenopathy present.  Cardiovascular: Regular rate, regular rhythm.  Pulmonary/Chest: Lungs are clear to auscultation bilaterally.  Abdominal: soft, no distension, good bowel sounds, she has some diffuse tenderness of palpation but no guarding/rebound/rigifity  Musculoskeletal: Poor posture.  Extremities: No edema. No deformities noted.  Neurological: Patient is alert and oriented.  Psychological:   Mood and behavior appropriate.  Skin: Skin is warm and dry.    Debilities/Disabilities Identified: None    Emotional Behavior: Appropriate           Results Review:    I reviewed the patient's new clinical results.  Lab Results (most recent)     Procedure Component Value Units Date/Time    CBC & Differential [[id number]] Collected:  01/18/20 0359    Specimen:  Blood Updated:  01/18/20 0849    Narrative:       The following orders were created for panel order CBC & Differential.  Procedure                               Abnormality         Status                     ---------                               -----------         ------                     CBC Auto Differential[[id number]]        Abnormal            Final result                 Please view results for these tests on the individual orders.    CBC Auto Differential [[id number]]  (Abnormal) Collected:  01/18/20 0359    Specimen:  Blood Updated:  01/18/20 0849     WBC 10.62 10*3/mm3      RBC 4.10 10*6/mm3      Hemoglobin 12.6 g/dL      Hematocrit 40.7 %      MCV 99.3 fL      MCH 30.7 pg      MCHC 31.0 g/dL      RDW 13.0 %      RDW-SD 48.2 fl      MPV 12.2 fL      Platelets 217 10*3/mm3      Neutrophil % 72.3 %      Lymphocyte % 18.8 %      Monocyte % 8.6 %      Eosinophil % 0.1 %      Basophil % 0.1 %      Immature Grans % 0.1 %      Neutrophils, Absolute 7.68 10*3/mm3      Lymphocytes, Absolute 2.00  10*3/mm3      Monocytes, Absolute 0.91 10*3/mm3      Eosinophils, Absolute 0.01 10*3/mm3      Basophils, Absolute 0.01 10*3/mm3      Immature Grans, Absolute 0.01 10*3/mm3     Basic Metabolic Panel [[id number]]  (Abnormal) Collected:  01/18/20 0359    Specimen:  Blood Updated:  01/18/20 0512     Glucose 113 mg/dL      BUN 16 mg/dL      Creatinine 0.83 mg/dL      Sodium 137 mmol/L      Potassium 4.3 mmol/L      Chloride 101 mmol/L      CO2 25.0 mmol/L      Calcium 8.6 mg/dL      eGFR Non African Amer 65 mL/min/1.73      BUN/Creatinine Ratio 19.3     Anion Gap 11.0 mmol/L     Narrative:       GFR Normal >60  Chronic Kidney Disease <60  Kidney Failure <15      Comprehensive Metabolic Panel [[id number]]  (Abnormal) Collected:  01/17/20 1627    Specimen:  Blood Updated:  01/17/20 1700     Glucose 139 mg/dL      BUN 17 mg/dL      Creatinine 0.88 mg/dL      Sodium 139 mmol/L      Potassium 4.3 mmol/L      Chloride 101 mmol/L      CO2 25.2 mmol/L      Calcium 9.6 mg/dL      Total Protein 7.4 g/dL      Albumin 3.90 g/dL      ALT (SGPT) 24 U/L      AST (SGOT) 28 U/L      Alkaline Phosphatase 75 U/L      Total Bilirubin 0.5 mg/dL      eGFR Non African Amer 61 mL/min/1.73      Globulin 3.5 gm/dL      A/G Ratio 1.1 g/dL      BUN/Creatinine Ratio 19.3     Anion Gap 12.8 mmol/L     Narrative:       GFR Normal >60  Chronic Kidney Disease <60  Kidney Failure <15      Lipase [[id number]]  (Normal) Collected:  01/17/20 1627    Specimen:  Blood Updated:  01/17/20 1700     Lipase 16 U/L     Urinalysis With Microscopic If Indicated (No Culture) - Urine, Clean Catch [[id number]]  (Normal) Collected:  01/17/20 1634    Specimen:  Urine, Clean Catch Updated:  01/17/20 1642     Color, UA Yellow     Appearance, UA Clear     pH, UA 6.5     Specific Gravity, UA 1.020     Glucose, UA Negative     Ketones, UA Negative     Bilirubin, UA Negative     Blood, UA Negative     Protein, UA Negative     Leuk Esterase, UA Negative     Nitrite, UA Negative      Urobilinogen, UA 0.2 E.U./dL    Narrative:       Urine microscopic not indicated.    CBC & Differential [[id number]] Collected:  01/17/20 1627    Specimen:  Blood Updated:  01/17/20 1641    Narrative:       The following orders were created for panel order CBC & Differential.  Procedure                               Abnormality         Status                     ---------                               -----------         ------                     CBC Auto Differential[[id number]]        Abnormal            Final result                 Please view results for these tests on the individual orders.    CBC Auto Differential [[id number]]  (Abnormal) Collected:  01/17/20 1627    Specimen:  Blood Updated:  01/17/20 1641     WBC 14.04 10*3/mm3      RBC 4.47 10*6/mm3      Hemoglobin 13.9 g/dL      Hematocrit 43.2 %      MCV 96.6 fL      MCH 31.1 pg      MCHC 32.2 g/dL      RDW 12.9 %      RDW-SD 46.0 fl      MPV 10.6 fL      Platelets 262 10*3/mm3      Neutrophil % 75.6 %      Lymphocyte % 17.9 %      Monocyte % 5.5 %      Eosinophil % 0.3 %      Basophil % 0.3 %      Immature Grans % 0.4 %      Neutrophils, Absolute 10.61 10*3/mm3      Lymphocytes, Absolute 2.52 10*3/mm3      Monocytes, Absolute 0.77 10*3/mm3      Eosinophils, Absolute 0.04 10*3/mm3      Basophils, Absolute 0.04 10*3/mm3      Immature Grans, Absolute 0.06 10*3/mm3      nRBC 0.0 /100 WBC           Imaging Results (Most Recent)     Procedure Component Value Units Date/Time    XR Abdomen KUB [[id number]] Collected:  01/18/20 0856     Updated:  01/18/20 0858    Narrative:       CR Abdomen 1 Vw    INDICATION:   Follow-up small bowel obstruction    COMPARISON:   CT abdomen pelvis 1/17/2020    FINDINGS:  AP radiographs of the abdomen. The renal shadows are symmetric. No renal calculi. No bladder calculi.    There is gas visible within couple of loops of small intestine that are slightly distended. It appears improved as compared with the scalp from yesterday's CT  scan. The colon is not distended No acute osseous abnormalities. Surgical clips are present in  the gallbladder fossa. A nasogastric tube has its tip in the antrum of stomach      Impression:       Bowel gas pattern appears improved as compared with  film from CT scan yesterday.    Nasogastric tube is in good position    Signer Name: Ricky Ingram MD   Signed: 1/18/2020 8:56 AM   Workstation Name: [facility]-    [facility]    CT Abdomen Pelvis With Contrast [[id number]] Collected:  01/17/20 1743     Updated:  01/17/20 1757    Narrative:       CT Abdomen Pelvis W 1/17/2020    INDICATION:   Epigastric abdominal pain, periumbilical abdominal pain and heartburn beginning today.    TECHNIQUE:   CT of the abdomen and pelvis with IV contrast. Coronal and sagittal reconstructions were obtained.  Radiation dose reduction techniques included automated exposure control or exposure modulation based on body size. Count of known CT and cardiac nuc med  studies performed in previous 12 months: 3.     COMPARISON:   12/13/2019    FINDINGS:  Abdomen: The liver is normal in appearance. Calcified granulomas are seen in the spleen. The pancreas, adrenal glands and right kidney are normal. Left renal cyst is noted. The gallbladder is surgically absent.    Pelvis: There is moderate dilatation of the proximal and mid small bowel with air-fluid levels identified. There is a transition zone in the mid abdomen. The remaining small bowel and the colon are relatively decompressed. Findings suggest mechanical  small bowel obstruction. No cause for the obstruction is seen on this examination. Consider adhesions. Colonic diverticulosis with inflammatory stranding in the fat adjacent to the sigmoid colon characteristic of diverticulitis. No abscess is seen. No  significant adenopathy. No free fluid in the abdomen or pelvis. Lung bases are unremarkable.      Impression:         1. Sigmoid diverticulitis. No evidence of  abscess.  2. Findings suggest mechanical small bowel obstruction in the mid abdomen as detailed above. Consider adhesions.  3. Surgical absence of the gallbladder.  4. Left renal cyst.          Signer Name: Kris Quintana MD   Signed: 1/17/2020 5:43 PM   Workstation Name: [id number]    [facility] [place]        reviewed    ECG/EMG Results (most recent)     None            Assessment/Plan     Diverticulitis  Small bowel obstruction  --this is most likely an ileus secondary to the diverticulitis.  I would recommend keeping the NG tube in today and encouraging ambulation.  Her films today show marked improvement but there is still some small bowel gas.  I agree with the Levaquin and Flagyl.  She needs to ambulate today.    I discussed the patients findings and my recommendations with patient and Dr. Juarez.     Kathia Mendoza,   01/18/20  9:30 AM

## 2020-01-18 NOTE — PROGRESS NOTES
"Hospitalist Team      Patient Care Team:  Laci Freedman MD as PCP - General (Family Medicine)        Chief Complaint:  NVD with abdominal pain    Subjective    Interval History and ROS:     Patient States Feeling better today  Patient Complaints: Generalized abdominal soreness  Patient Denies:  Abdominal pain today and NVD is improved  History taken from: patient chart family RN      Objective    Vital Signs  Temp:  [97.8 °F (36.6 °C)-98 °F (36.7 °C)] 98 °F (36.7 °C)  Heart Rate:  [80-91] 80  Resp:  [16-18] 16  BP: (138-197)/(75-88) 168/75  Oxygen Therapy  SpO2: 95 %  Pulse Oximetry Type: Intermittent  Device (Oxygen Therapy): room air}  Flowsheet Rows      First Filed Value   Admission Height  139.7 cm (55\") Documented at 01/17/2020 1604   Admission Weight  65.8 kg (145 lb) Documented at 01/17/2020 1604        Body mass index is 34.51 kg/m².      Physical Exam:    Physical Exam   Constitutional: She is oriented to person, place, and time.   Cardiovascular: Normal rate and regular rhythm.   Pulmonary/Chest: Effort normal and breath sounds normal.   Abdominal: Soft. Bowel sounds are normal.   Neurological: She is alert and oriented to person, place, and time.   Skin: Skin is warm and dry.   Nursing note and vitals reviewed.      Results Review:     I reviewed the patient's new clinical results.    Lab Results (last 24 hours)     Procedure Component Value Units Date/Time    CBC & Differential [[id number]] Collected:  01/18/20 0359    Specimen:  Blood Updated:  01/18/20 0849    Narrative:       The following orders were created for panel order CBC & Differential.  Procedure                               Abnormality         Status                     ---------                               -----------         ------                     CBC Auto Differential[[id number]]        Abnormal            Final result                 Please view results for these tests on the individual orders.    CBC Auto Differential " [[id number]]  (Abnormal) Collected:  01/18/20 0359    Specimen:  Blood Updated:  01/18/20 0849     WBC 10.62 10*3/mm3      RBC 4.10 10*6/mm3      Hemoglobin 12.6 g/dL      Hematocrit 40.7 %      MCV 99.3 fL      MCH 30.7 pg      MCHC 31.0 g/dL      RDW 13.0 %      RDW-SD 48.2 fl      MPV 12.2 fL      Platelets 217 10*3/mm3      Neutrophil % 72.3 %      Lymphocyte % 18.8 %      Monocyte % 8.6 %      Eosinophil % 0.1 %      Basophil % 0.1 %      Immature Grans % 0.1 %      Neutrophils, Absolute 7.68 10*3/mm3      Lymphocytes, Absolute 2.00 10*3/mm3      Monocytes, Absolute 0.91 10*3/mm3      Eosinophils, Absolute 0.01 10*3/mm3      Basophils, Absolute 0.01 10*3/mm3      Immature Grans, Absolute 0.01 10*3/mm3     Basic Metabolic Panel [[id number]]  (Abnormal) Collected:  01/18/20 0359    Specimen:  Blood Updated:  01/18/20 0512     Glucose 113 mg/dL      BUN 16 mg/dL      Creatinine 0.83 mg/dL      Sodium 137 mmol/L      Potassium 4.3 mmol/L      Chloride 101 mmol/L      CO2 25.0 mmol/L      Calcium 8.6 mg/dL      eGFR Non African Amer 65 mL/min/1.73      BUN/Creatinine Ratio 19.3     Anion Gap 11.0 mmol/L     Narrative:       GFR Normal >60  Chronic Kidney Disease <60  Kidney Failure <15      Comprehensive Metabolic Panel [[id number]]  (Abnormal) Collected:  01/17/20 1627    Specimen:  Blood Updated:  01/17/20 1700     Glucose 139 mg/dL      BUN 17 mg/dL      Creatinine 0.88 mg/dL      Sodium 139 mmol/L      Potassium 4.3 mmol/L      Chloride 101 mmol/L      CO2 25.2 mmol/L      Calcium 9.6 mg/dL      Total Protein 7.4 g/dL      Albumin 3.90 g/dL      ALT (SGPT) 24 U/L      AST (SGOT) 28 U/L      Alkaline Phosphatase 75 U/L      Total Bilirubin 0.5 mg/dL      eGFR Non African Amer 61 mL/min/1.73      Globulin 3.5 gm/dL      A/G Ratio 1.1 g/dL      BUN/Creatinine Ratio 19.3     Anion Gap 12.8 mmol/L     Narrative:       GFR Normal >60  Chronic Kidney Disease <60  Kidney Failure <15      Lipase [[id number]]  (Normal)  Collected:  01/17/20 1627    Specimen:  Blood Updated:  01/17/20 1700     Lipase 16 U/L     Urinalysis With Microscopic If Indicated (No Culture) - Urine, Clean Catch [[id number]]  (Normal) Collected:  01/17/20 1634    Specimen:  Urine, Clean Catch Updated:  01/17/20 1642     Color, UA Yellow     Appearance, UA Clear     pH, UA 6.5     Specific Gravity, UA 1.020     Glucose, UA Negative     Ketones, UA Negative     Bilirubin, UA Negative     Blood, UA Negative     Protein, UA Negative     Leuk Esterase, UA Negative     Nitrite, UA Negative     Urobilinogen, UA 0.2 E.U./dL    Narrative:       Urine microscopic not indicated.    CBC & Differential [[id number]] Collected:  01/17/20 1627    Specimen:  Blood Updated:  01/17/20 1641    Narrative:       The following orders were created for panel order CBC & Differential.  Procedure                               Abnormality         Status                     ---------                               -----------         ------                     CBC Auto Differential[[id number]]        Abnormal            Final result                 Please view results for these tests on the individual orders.    CBC Auto Differential [[id number]]  (Abnormal) Collected:  01/17/20 1627    Specimen:  Blood Updated:  01/17/20 1641     WBC 14.04 10*3/mm3      RBC 4.47 10*6/mm3      Hemoglobin 13.9 g/dL      Hematocrit 43.2 %      MCV 96.6 fL      MCH 31.1 pg      MCHC 32.2 g/dL      RDW 12.9 %      RDW-SD 46.0 fl      MPV 10.6 fL      Platelets 262 10*3/mm3      Neutrophil % 75.6 %      Lymphocyte % 17.9 %      Monocyte % 5.5 %      Eosinophil % 0.3 %      Basophil % 0.3 %      Immature Grans % 0.4 %      Neutrophils, Absolute 10.61 10*3/mm3      Lymphocytes, Absolute 2.52 10*3/mm3      Monocytes, Absolute 0.77 10*3/mm3      Eosinophils, Absolute 0.04 10*3/mm3      Basophils, Absolute 0.04 10*3/mm3      Immature Grans, Absolute 0.06 10*3/mm3      nRBC 0.0 /100 WBC           Imaging Results (Last 24  Hours)     Procedure Component Value Units Date/Time    XR Abdomen KUB [[id number]] Collected:  01/18/20 0856     Updated:  01/18/20 0858    Narrative:       CR Abdomen 1 Vw    INDICATION:   Follow-up small bowel obstruction    COMPARISON:   CT abdomen pelvis 1/17/2020    FINDINGS:  AP radiographs of the abdomen. The renal shadows are symmetric. No renal calculi. No bladder calculi.    There is gas visible within couple of loops of small intestine that are slightly distended. It appears improved as compared with the scalp from yesterday's CT scan. The colon is not distended No acute osseous abnormalities. Surgical clips are present in  the gallbladder fossa. A nasogastric tube has its tip in the antrum of stomach      Impression:       Bowel gas pattern appears improved as compared with  film from CT scan yesterday.    Nasogastric tube is in good position    Signer Name: Ricky Ingram MD   Signed: 1/18/2020 8:56 AM   Workstation Name: [facility]-    [facility]    CT Abdomen Pelvis With Contrast [[id number]] Collected:  01/17/20 1743     Updated:  01/17/20 1757    Narrative:       CT Abdomen Pelvis W 1/17/2020    INDICATION:   Epigastric abdominal pain, periumbilical abdominal pain and heartburn beginning today.    TECHNIQUE:   CT of the abdomen and pelvis with IV contrast. Coronal and sagittal reconstructions were obtained.  Radiation dose reduction techniques included automated exposure control or exposure modulation based on body size. Count of known CT and cardiac nuc med  studies performed in previous 12 months: 3.     COMPARISON:   12/13/2019    FINDINGS:  Abdomen: The liver is normal in appearance. Calcified granulomas are seen in the spleen. The pancreas, adrenal glands and right kidney are normal. Left renal cyst is noted. The gallbladder is surgically absent.    Pelvis: There is moderate dilatation of the proximal and mid small bowel with air-fluid levels identified. There is a  transition zone in the mid abdomen. The remaining small bowel and the colon are relatively decompressed. Findings suggest mechanical  small bowel obstruction. No cause for the obstruction is seen on this examination. Consider adhesions. Colonic diverticulosis with inflammatory stranding in the fat adjacent to the sigmoid colon characteristic of diverticulitis. No abscess is seen. No  significant adenopathy. No free fluid in the abdomen or pelvis. Lung bases are unremarkable.      Impression:         1. Sigmoid diverticulitis. No evidence of abscess.  2. Findings suggest mechanical small bowel obstruction in the mid abdomen as detailed above. Consider adhesions.  3. Surgical absence of the gallbladder.  4. Left renal cyst.          Signer Name: Kris Quintana MD   Signed: 1/17/2020 5:43 PM   Workstation Name: [email or web address]    [facility]          Xray not reviewed personally by physician.      ECG not reviewed personally by physician  ECG/EMG Results (most recent)     None          Medication Review:   I have reviewed the patient's current medication list    Current Facility-Administered Medications:   •  acetaminophen (TYLENOL) tablet 650 mg, 650 mg, Oral, Q4H PRN **OR** acetaminophen (TYLENOL) 160 MG/5ML solution 650 mg, 650 mg, Oral, Q4H PRN **OR** acetaminophen (TYLENOL) suppository 650 mg, 650 mg, Rectal, Q4H PRN, Milton Cummings MD  •  calcium carbonate (TUMS) chewable tablet 500 mg (200 mg elemental), 1 tablet, Oral, BID PRN, Milton Cummings MD  •  famotidine (PEPCID) injection 20 mg, 20 mg, Intravenous, Daily, Milton Cummings MD, 20 mg at 01/18/20 0820  •  lactated ringers infusion, 100 mL/hr, Intravenous, Continuous, Milton Cummings MD, Last Rate: 100 mL/hr at 01/18/20 0858, 100 mL/hr at 01/18/20 0858  •  levoFLOXacin (LEVAQUIN) 500 mg/100 mL D5W (premix) (LEVAQUIN) 500 mg, 500 mg, Intravenous, Q24H, Milton Cummings MD  •   metroNIDAZOLE (FLAGYL) 500 mg/100mL IVPB, 500 mg, Intravenous, Once, Boyd Cox MD  •  metroNIDAZOLE (FLAGYL) 500 mg/100mL IVPB, 500 mg, Intravenous, Q8H, Milton Cummings MD, 500 mg at 01/18/20 0901  •  nitroglycerin (NITROSTAT) SL tablet 0.4 mg, 0.4 mg, Sublingual, Q5 Min PRN, Milton Cummings MD  •  ondansetron (ZOFRAN) tablet 4 mg, 4 mg, Oral, Q6H PRN **OR** ondansetron (ZOFRAN) injection 4 mg, 4 mg, Intravenous, Q6H PRN, Milton Cummings MD  •  sodium chloride 0.9 % flush 1-10 mL, 1-10 mL, Intravenous, PRN, Milton Cummings MD  •  [COMPLETED] Insert peripheral IV, , , Once **AND** sodium chloride 0.9 % flush 10 mL, 10 mL, Intravenous, PRN, Boyd Cox MD  •  sodium chloride 0.9 % flush 10 mL, 10 mL, Intravenous, Q12H, Milton Cummings MD, 10 mL at 01/18/20 0820  •  sodium chloride 0.9 % infusion 40 mL, 40 mL, Intravenous, PRN, Milton Cummings MD      Assessment/Plan     Acute sigmoid diverticulitis    Acute partial small bowel obstruction with NG in place    Essential hypertension controlled    Hypothyroidism    Generalized anxiety disorde  r  Recent bronchitis treated with Z-Osorio  C. difficile was ordered secondary to the patient having a his history of C. difficile colitis in the past              Plan for disposition:  Continue NG suction and follow with surgery.  Review medications for patient.  She need eye gtts.  I have ordered them with the help of Pharmacy.    Mery Juarez DO  01/18/20  11:27 AM      Time: 25 min

## 2020-01-18 NOTE — H&P
HISTORY AND PHYSICAL      Patient Care Team:  Laci Freedman MD as PCP - General (Family Medicine)    CHIEF COMPLAINT: Nausea, vomiting, diarrhea, abdominal pain    HISTORY OF PRESENT ILLNESS:    89-year-old white female very pleasant who was in her usual state of health until this morning when she woke up nauseated.  Patient had crampy periumbilical and upper abdominal pain followed by one bout of emesis and nonbloody diarrhea.  Patient took Imodium and Mirna-[place] and felt better but then started having abdominal discomfort again.  Patient states her pain persisted and was intermittent throughout the day and therefore she decided come to emergency room.  She had no further diarrhea but she has felt bloated and has noticed increasing abdominal distention since this morning.    Patient has had C. difficile in the past was concerned that she may have it again as she had completed a course of Z-Osorio about 2 weeks ago.  Denies any fever chills    Past Medical History:   Diagnosis Date   • Abdominal pain    • Anxiety    • Arthritis    • Atrial fibrillation (CMS/HCC)    • C. difficile colitis    • Diarrhea    • GERD (gastroesophageal reflux disease)    • Hyperlipidemia    • Hypertension    • Hypothyroid    • Presbyesophagus    • Sinus congestion    • Thrush      Past Surgical History:   Procedure Laterality Date   • ABDOMINAL SURGERY     • APPENDECTOMY     • CHOLECYSTECTOMY     • ENDOSCOPY N/A 7/5/2017    Procedure: ESOPHAGOGASTRODUODENOSCOPY WITH DILATION;  Surgeon: Betito Montejo MD;  Location: [facility];  Service:    • HYSTERECTOMY     • KNEE ARTHROSCOPY Right    • THYROIDECTOMY       Family History   Problem Relation Age of Onset   • Heart disease Father    • Cancer Sister    • Cancer Brother      Social History     Tobacco Use   • Smoking status: Never Smoker   • Smokeless tobacco: Never Used   Substance Use Topics   • Alcohol use: No   • Drug use: No     Medications Prior to Admission   Medication  Sig Dispense Refill Last Dose   • aspirin 81 MG chewable tablet Chew 81 mg Daily.   1/17/2020 at Unknown time   • benzonatate (TESSALON) 100 MG capsule Take 100 mg by mouth 3 (Three) Times a Day As Needed for Cough.   1/17/2020 at Unknown time   • levothyroxine (SYNTHROID) 50 MCG tablet Take 50 mcg by mouth Daily.   1/17/2020 at Unknown time   • LORazepam (ATIVAN) 1 MG tablet Take 1 mg by mouth 3 (Three) Times a Day. Takes 1 in AM; 2 at HS.  Per daughter in law pt PMD told pt she can take an extra at 5pm   1/17/2020 at Unknown time   • Calcium Carbonate-Vitamin D (CALCIUM 500 + D PO) Take 1 tablet by mouth 2 (Two) Times a Day.   12/13/2019 at 0700   • clotrimazole-betamethasone (LOTRISONE) 1-0.05 % cream Apply 1 application topically 2 (Two) Times a Day As Needed (HEMMROIDS).   12/12/2019 at 2030   • dicyclomine (BENTYL) 10 MG capsule Take 10 mg by mouth 4 (Four) Times a Day Before Meals & at Bedtime As Needed (ABDOMINAL PAIN).   12/13/2019 at 0700   • diltiaZEM (CARDIZEM) 30 MG tablet Take 30 mg by mouth 2 (Two) Times a Day.   12/13/2019 at 0700   • fluticasone (FLONASE) 50 MCG/ACT nasal spray 2 sprays into each nostril Daily.   12/12/2019 at 2030   • guaiFENesin (MUCINEX) 600 MG 12 hr tablet Take 600 mg by mouth Every 12 (Twelve) Hours As Needed for Cough.   Past Month at Unknown time   • hydrocortisone-pramoxine (PROCTOFOAM-HS) 1-1 % rectal foam Insert 1 applicator into the rectum 2 (Two) Times a Day. 10 g 0 Unknown at Unknown time   • levocetirizine (XYZAL) 5 MG tablet Take 5 mg by mouth Every Evening.   12/12/2019 at 2030   • lisinopril (PRINIVIL,ZESTRIL) 20 MG tablet Take 1 tablet by mouth Daily. Take one half tablet daily for 1 week then advanced to one full tablet daily 30 tablet 0 12/13/2019 at 0700   • meclizine (ANTIVERT) 12.5 MG tablet Take 12.5 mg by mouth 3 (Three) Times a Day As Needed for dizziness.   Unknown at Unknown time   • Multiple Vitamins-Minerals (CENTRUM SILVER PO) Take 1 tablet by mouth  Daily.   12/13/2019 at 0700   • pantoprazole (PROTONIX) 40 MG EC tablet Take 40 mg by mouth Daily.   12/13/2019 at 0700   • Potassium Chloride (KLOR-CON 10 PO) Take 10 mEq by mouth Daily.   12/13/2019 at 0700   • raNITIdine (ZANTAC) 150 MG tablet Take 150 mg by mouth 2 (Two) Times a Day.   Unknown at Unknown time   • simethicone (MYLICON) 125 MG chewable tablet Chew 125 mg Every 6 (Six) Hours As Needed for Flatulence.   12/10/2019 at unkn     Allergies:  Biaxin [clarithromycin]; Ceftin [cefuroxime]; Celexa [citalopram]; Chlorpheniramine; Ciprofloxacin; Crestor [rosuvastatin]; Doxycycline; Effexor [venlafaxine]; Fluoxetine; Hydrocodone bitartrate; Imipramine hcl; Levaquin [levofloxacin]; Lexapro [escitalopram oxalate]; Macrobid [nitrofurantoin monohyd macro]; Methscopolamine; Phenylpropanolamine; Prednisone; Promethazine; Simvastatin; Verapamil; Welchol [colesevelam hcl]; Zetia [ezetimibe]; Zoloft [sertraline hcl]; and Penicillins     Review of Systems   Constitutional: Positive for appetite change. Negative for activity change and fatigue.   HENT: Negative for congestion.    Respiratory: Positive for cough. Negative for chest tightness, shortness of breath and wheezing.    Cardiovascular: Negative for chest pain.   Gastrointestinal: Positive for abdominal pain, diarrhea, nausea and vomiting. Negative for abdominal distention.   Endocrine: Negative for polyphagia and polyuria.   Genitourinary: Positive for dysuria. Negative for frequency.   Skin: Negative for rash.   Allergic/Immunologic: Positive for environmental allergies.   Neurological: Negative for light-headedness.   Hematological: Does not bruise/bleed easily.   Psychiatric/Behavioral: Negative for agitation and behavioral problems.       Vital Signs  Temp:  [97.8 °F (36.6 °C)] 97.8 °F (36.6 °C)  Heart Rate:  [86-91] 86  Resp:  [16-18] 16  BP: (138-197)/(78-88) 138/80  Oxygen Therapy  SpO2: 92 %  Pulse Oximetry Type: Intermittent  Device (Oxygen Therapy):  "room air}  Body mass index is 33.7 kg/m².  Flowsheet Rows      First Filed Value   Admission Height  139.7 cm (55\") Documented at 01/17/2020 1604   Admission Weight  65.8 kg (145 lb) Documented at 01/17/2020 1604                 Physical Exam   Constitutional: She is oriented to person, place, and time. She appears well-developed and well-nourished.   HENT:   Head: Normocephalic.   Mouth/Throat: Oropharynx is clear and moist.   Hard of hearing   Eyes: Conjunctivae are normal.   Neck: Normal range of motion. No JVD present. No thyromegaly present.   Cardiovascular: Normal rate, regular rhythm and normal heart sounds.   No murmur heard.  Pulmonary/Chest: Effort normal and breath sounds normal. No respiratory distress. She has no wheezes. She has no rales.   Abdominal: Soft. She exhibits distension. Bowel sounds are decreased. There is generalized tenderness. There is no guarding.   Neurological: She is alert and oriented to person, place, and time.   Skin: Skin is warm and dry. No rash noted.   Nursing note and vitals reviewed.       Debilities/Disabilities Identified: None    Emotional Behavior: Appropriate    Results Review:    I reviewed the patient's new clinical results.  Lab Results (most recent)     Procedure Component Value Units Date/Time    Comprehensive Metabolic Panel [[id number]]  (Abnormal) Collected:  01/17/20 1627    Specimen:  Blood Updated:  01/17/20 1700     Glucose 139 mg/dL      BUN 17 mg/dL      Creatinine 0.88 mg/dL      Sodium 139 mmol/L      Potassium 4.3 mmol/L      Chloride 101 mmol/L      CO2 25.2 mmol/L      Calcium 9.6 mg/dL      Total Protein 7.4 g/dL      Albumin 3.90 g/dL      ALT (SGPT) 24 U/L      AST (SGOT) 28 U/L      Alkaline Phosphatase 75 U/L      Total Bilirubin 0.5 mg/dL      eGFR Non African Amer 61 mL/min/1.73      Globulin 3.5 gm/dL      A/G Ratio 1.1 g/dL      BUN/Creatinine Ratio 19.3     Anion Gap 12.8 mmol/L     Narrative:       GFR Normal >60  Chronic Kidney Disease " <60  Kidney Failure <15      Lipase [[id number]]  (Normal) Collected:  01/17/20 1627    Specimen:  Blood Updated:  01/17/20 1700     Lipase 16 U/L     Urinalysis With Microscopic If Indicated (No Culture) - Urine, Clean Catch [[id number]]  (Normal) Collected:  01/17/20 1634    Specimen:  Urine, Clean Catch Updated:  01/17/20 1642     Color, UA Yellow     Appearance, UA Clear     pH, UA 6.5     Specific Gravity, UA 1.020     Glucose, UA Negative     Ketones, UA Negative     Bilirubin, UA Negative     Blood, UA Negative     Protein, UA Negative     Leuk Esterase, UA Negative     Nitrite, UA Negative     Urobilinogen, UA 0.2 E.U./dL    Narrative:       Urine microscopic not indicated.    CBC & Differential [[id number]] Collected:  01/17/20 1627    Specimen:  Blood Updated:  01/17/20 1641    Narrative:       The following orders were created for panel order CBC & Differential.  Procedure                               Abnormality         Status                     ---------                               -----------         ------                     CBC Auto Differential[[id number]]        Abnormal            Final result                 Please view results for these tests on the individual orders.    CBC Auto Differential [[id number]]  (Abnormal) Collected:  01/17/20 1627    Specimen:  Blood Updated:  01/17/20 1641     WBC 14.04 10*3/mm3      RBC 4.47 10*6/mm3      Hemoglobin 13.9 g/dL      Hematocrit 43.2 %      MCV 96.6 fL      MCH 31.1 pg      MCHC 32.2 g/dL      RDW 12.9 %      RDW-SD 46.0 fl      MPV 10.6 fL      Platelets 262 10*3/mm3      Neutrophil % 75.6 %      Lymphocyte % 17.9 %      Monocyte % 5.5 %      Eosinophil % 0.3 %      Basophil % 0.3 %      Immature Grans % 0.4 %      Neutrophils, Absolute 10.61 10*3/mm3      Lymphocytes, Absolute 2.52 10*3/mm3      Monocytes, Absolute 0.77 10*3/mm3      Eosinophils, Absolute 0.04 10*3/mm3      Basophils, Absolute 0.04 10*3/mm3      Immature Grans, Absolute 0.06 10*3/mm3       nRBC 0.0 /100 WBC           Imaging Results (Most Recent)     Procedure Component Value Units Date/Time    CT Abdomen Pelvis With Contrast [[id number]] Collected:  01/17/20 1743     Updated:  01/17/20 1757    Narrative:       CT Abdomen Pelvis W 1/17/2020    INDICATION:   Epigastric abdominal pain, periumbilical abdominal pain and heartburn beginning today.    TECHNIQUE:   CT of the abdomen and pelvis with IV contrast. Coronal and sagittal reconstructions were obtained.  Radiation dose reduction techniques included automated exposure control or exposure modulation based on body size. Count of known CT and cardiac nuc med  studies performed in previous 12 months: 3.     COMPARISON:   12/13/2019    FINDINGS:  Abdomen: The liver is normal in appearance. Calcified granulomas are seen in the spleen. The pancreas, adrenal glands and right kidney are normal. Left renal cyst is noted. The gallbladder is surgically absent.    Pelvis: There is moderate dilatation of the proximal and mid small bowel with air-fluid levels identified. There is a transition zone in the mid abdomen. The remaining small bowel and the colon are relatively decompressed. Findings suggest mechanical  small bowel obstruction. No cause for the obstruction is seen on this examination. Consider adhesions. Colonic diverticulosis with inflammatory stranding in the fat adjacent to the sigmoid colon characteristic of diverticulitis. No abscess is seen. No  significant adenopathy. No free fluid in the abdomen or pelvis. Lung bases are unremarkable.      Impression:         1. Sigmoid diverticulitis. No evidence of abscess.  2. Findings suggest mechanical small bowel obstruction in the mid abdomen as detailed above. Consider adhesions.  3. Surgical absence of the gallbladder.  4. Left renal cyst.          Signer Name: Kris Quintana MD   Signed: 1/17/2020 5:43 PM   Workstation Name: [email or web address]    [facility]        reviewed    ECG/EMG  Results (most recent)     None        not reviewed    Assessment/Plan       1.  Acute sigmoid diverticulitis patient be started on IV Levaquin and Flagyl kept n.p.o. IV fluids IV antiemetics and symptomatic care    2.  Acute partial small bowel obstruction NG tube is been placed IV fluids and n.p.o. status surgery is been consulted    3.  Hypertension nothing acute we will treat with IV hydralazine if need be at present her blood pressure is controlled    4.  Hypothyroidism nothing acute home meds will be resumed once patient taking p.o.      5.  Mild generalized anxiety disorder PRN Ativan    6.  Recent bronchitis with use of Z-Osorio C. difficile is been ordered as patient has history of C. difficile colitis in the past    7.  DVT prophylaxis SCDs      I discussed the patients findings and my recommendations with patient and multiple family members were at bedside    Milton Cummings MD  01/17/20  10:24 PM    Much of this encounter note is an electronic transcription/translation of spoken language to printed text using Dragon Software

## 2020-01-18 NOTE — PLAN OF CARE
Problem: Patient Care Overview  Goal: Plan of Care Review  Outcome: Outcome(s) achieved  Flowsheets (Taken 1/18/2020 4439)  Progress: improving  Plan of Care Reviewed With: patient  Outcome Summary: NGT R esthere on lws with 200 ml out this shift, passing flatus and has been up ambulating about unit. Denies pain/discomfort and has not complaint of n/v this shift.

## 2020-01-18 NOTE — NURSING NOTE
Continued Stay Note  PAULO Pillai     Patient Name: Paulo Hampton  MRN: [id number]  Today's Date: 1/18/2020    Admit Date: 1/17/2020    Discharge Plan     Row Name 01/18/20 0935       Plan    Plan Comments  Patient h as been taken to x-ray.  Will interview later.          Discharge Codes    No documentation.             Danielle Soria RN

## 2020-01-18 NOTE — NURSING NOTE
Discharge Planning Assessment  PAULO Pillai     Patient Name: Paulo Hampton  MRN: [id number]  Today's Date: 1/18/2020    Admit Date: 1/17/2020    Discharge Needs Assessment    No documentation.       Discharge Plan     Row Name 01/18/20 1351       Plan    Plan  Home when stable    Provided post acute provider list?  N/A    N/A Provider List Comment  No needs at the present time    Patient/Family in Agreement with Plan  yes    Plan Comments  Spoke with Mrs Hampton and her grandson (with permission )  at bedside.  She is very hard of hearing so he answered most of the questions.  Facesheet verified.  She lives alone mary home in [place].  The grandson lives next door and the granddaughter lives a short distance away.  She does not have any DME, oxygen or home health.  She is independent with her ADL's.  Her pharmacy is [facility] in [place].  She has an advanced directive on file here at the hospital.  Plan is home when stable.  Will continue to follow        Destination      Coordination has not been started for this encounter.      Durable Medical Equipment      Coordination has not been started for this encounter.      Dialysis/Infusion      Coordination has not been started for this encounter.      Home Medical Care      Coordination has not been started for this encounter.      Therapy      Coordination has not been started for this encounter.      Community Resources      Coordination has not been started for this encounter.          Demographic Summary    No documentation.       Functional Status    No documentation.       Psychosocial    No documentation.       Abuse/Neglect    No documentation.       Legal    No documentation.       Substance Abuse    No documentation.       Patient Forms    No documentation.           Danielle Soria RN

## 2020-01-19 ENCOUNTER — APPOINTMENT (OUTPATIENT)
Dept: GENERAL RADIOLOGY | Facility: HOSPITAL | Age: 85
End: 2020-01-19

## 2020-01-19 LAB
ANION GAP SERPL CALCULATED.3IONS-SCNC: 16 MMOL/L (ref 5–15)
BASOPHILS # BLD AUTO: 0.01 10*3/MM3 (ref 0–0.2)
BASOPHILS NFR BLD AUTO: 0.1 % (ref 0–1.5)
BUN BLD-MCNC: 11 MG/DL (ref 8–23)
BUN/CREAT SERPL: 12.4 (ref 7–25)
CALCIUM SPEC-SCNC: 8.2 MG/DL (ref 8.6–10.5)
CHLORIDE SERPL-SCNC: 99 MMOL/L (ref 98–107)
CO2 SERPL-SCNC: 23 MMOL/L (ref 22–29)
CREAT BLD-MCNC: 0.89 MG/DL (ref 0.57–1)
DEPRECATED RDW RBC AUTO: 46.2 FL (ref 37–54)
EOSINOPHIL # BLD AUTO: 0 10*3/MM3 (ref 0–0.4)
EOSINOPHIL NFR BLD AUTO: 0 % (ref 0.3–6.2)
ERYTHROCYTE [DISTWIDTH] IN BLOOD BY AUTOMATED COUNT: 12.8 % (ref 12.3–15.4)
GFR SERPL CREATININE-BSD FRML MDRD: 60 ML/MIN/1.73
GLUCOSE BLD-MCNC: 127 MG/DL (ref 65–99)
HCT VFR BLD AUTO: 40.6 % (ref 34–46.6)
HGB BLD-MCNC: 12.7 G/DL (ref 12–15.9)
IMM GRANULOCYTES # BLD AUTO: 0.03 10*3/MM3 (ref 0–0.05)
IMM GRANULOCYTES NFR BLD AUTO: 0.3 % (ref 0–0.5)
LYMPHOCYTES # BLD AUTO: 1.55 10*3/MM3 (ref 0.7–3.1)
LYMPHOCYTES NFR BLD AUTO: 15 % (ref 19.6–45.3)
MCH RBC QN AUTO: 30.9 PG (ref 26.6–33)
MCHC RBC AUTO-ENTMCNC: 31.3 G/DL (ref 31.5–35.7)
MCV RBC AUTO: 98.8 FL (ref 79–97)
MONOCYTES # BLD AUTO: 0.59 10*3/MM3 (ref 0.1–0.9)
MONOCYTES NFR BLD AUTO: 5.7 % (ref 5–12)
NEUTROPHILS # BLD AUTO: 8.12 10*3/MM3 (ref 1.7–7)
NEUTROPHILS NFR BLD AUTO: 78.9 % (ref 42.7–76)
NRBC BLD AUTO-RTO: 0 /100 WBC (ref 0–0.2)
PLATELET # BLD AUTO: 226 10*3/MM3 (ref 140–450)
PMV BLD AUTO: 11.3 FL (ref 6–12)
POTASSIUM BLD-SCNC: 3.2 MMOL/L (ref 3.5–5.2)
POTASSIUM BLD-SCNC: 3.8 MMOL/L (ref 3.5–5.2)
RBC # BLD AUTO: 4.11 10*6/MM3 (ref 3.77–5.28)
SODIUM BLD-SCNC: 138 MMOL/L (ref 136–145)
TROPONIN T SERPL-MCNC: <0.01 NG/ML (ref 0–0.03)
WBC NRBC COR # BLD: 10.3 10*3/MM3 (ref 3.4–10.8)

## 2020-01-19 PROCEDURE — 99231 SBSQ HOSP IP/OBS SF/LOW 25: CPT | Performed by: SURGERY

## 2020-01-19 PROCEDURE — 80048 BASIC METABOLIC PNL TOTAL CA: CPT | Performed by: FAMILY MEDICINE

## 2020-01-19 PROCEDURE — 25010000003 POTASSIUM CHLORIDE 10 MEQ/100ML SOLUTION: Performed by: HOSPITALIST

## 2020-01-19 PROCEDURE — 25010000002 HYDRALAZINE PER 20 MG: Performed by: HOSPITALIST

## 2020-01-19 PROCEDURE — 85025 COMPLETE CBC W/AUTO DIFF WBC: CPT | Performed by: SURGERY

## 2020-01-19 PROCEDURE — 25010000002 LEVOFLOXACIN PER 250 MG: Performed by: FAMILY MEDICINE

## 2020-01-19 PROCEDURE — 25010000002 ONDANSETRON PER 1 MG: Performed by: FAMILY MEDICINE

## 2020-01-19 PROCEDURE — 84132 ASSAY OF SERUM POTASSIUM: CPT | Performed by: HOSPITALIST

## 2020-01-19 PROCEDURE — 99232 SBSQ HOSP IP/OBS MODERATE 35: CPT | Performed by: HOSPITALIST

## 2020-01-19 PROCEDURE — 74018 RADEX ABDOMEN 1 VIEW: CPT

## 2020-01-19 PROCEDURE — 25010000002 LORAZEPAM PER 2 MG: Performed by: HOSPITALIST

## 2020-01-19 RX ORDER — LORAZEPAM 2 MG/ML
0.5 INJECTION INTRAMUSCULAR EVERY 8 HOURS
Status: DISCONTINUED | OUTPATIENT
Start: 2020-01-19 | End: 2020-01-20

## 2020-01-19 RX ORDER — POTASSIUM CHLORIDE 7.45 MG/ML
10 INJECTION INTRAVENOUS
Status: DISCONTINUED | OUTPATIENT
Start: 2020-01-19 | End: 2020-01-21 | Stop reason: HOSPADM

## 2020-01-19 RX ORDER — POTASSIUM CHLORIDE 1.5 G/1.77G
40 POWDER, FOR SOLUTION ORAL AS NEEDED
Status: DISCONTINUED | OUTPATIENT
Start: 2020-01-19 | End: 2020-01-21 | Stop reason: HOSPADM

## 2020-01-19 RX ORDER — LORAZEPAM 2 MG/ML
0.5 INJECTION INTRAMUSCULAR ONCE
Status: COMPLETED | OUTPATIENT
Start: 2020-01-19 | End: 2020-01-19

## 2020-01-19 RX ORDER — POTASSIUM CHLORIDE 20 MEQ/1
40 TABLET, EXTENDED RELEASE ORAL AS NEEDED
Status: DISCONTINUED | OUTPATIENT
Start: 2020-01-19 | End: 2020-01-21 | Stop reason: HOSPADM

## 2020-01-19 RX ADMIN — LORAZEPAM 0.5 MG: 2 INJECTION INTRAMUSCULAR; INTRAVENOUS at 16:51

## 2020-01-19 RX ADMIN — METRONIDAZOLE 500 MG: 500 INJECTION, SOLUTION INTRAVENOUS at 17:00

## 2020-01-19 RX ADMIN — POTASSIUM CHLORIDE 10 MEQ: 7.46 INJECTION, SOLUTION INTRAVENOUS at 13:39

## 2020-01-19 RX ADMIN — LORAZEPAM 0.5 MG: 2 INJECTION INTRAMUSCULAR; INTRAVENOUS at 11:00

## 2020-01-19 RX ADMIN — SODIUM CHLORIDE, POTASSIUM CHLORIDE, SODIUM LACTATE AND CALCIUM CHLORIDE 100 ML/HR: 600; 310; 30; 20 INJECTION, SOLUTION INTRAVENOUS at 06:12

## 2020-01-19 RX ADMIN — BRIMONIDINE TARTRATE 1 DROP: 2 SOLUTION OPHTHALMIC at 21:13

## 2020-01-19 RX ADMIN — POTASSIUM CHLORIDE 10 MEQ: 7.46 INJECTION, SOLUTION INTRAVENOUS at 12:31

## 2020-01-19 RX ADMIN — SODIUM CHLORIDE, PRESERVATIVE FREE 10 ML: 5 INJECTION INTRAVENOUS at 21:15

## 2020-01-19 RX ADMIN — HYDRALAZINE HYDROCHLORIDE 10 MG: 20 INJECTION INTRAMUSCULAR; INTRAVENOUS at 21:07

## 2020-01-19 RX ADMIN — SODIUM CHLORIDE, POTASSIUM CHLORIDE, SODIUM LACTATE AND CALCIUM CHLORIDE 100 ML/HR: 600; 310; 30; 20 INJECTION, SOLUTION INTRAVENOUS at 16:56

## 2020-01-19 RX ADMIN — LORAZEPAM 0.5 MG: 2 INJECTION INTRAMUSCULAR; INTRAVENOUS at 08:19

## 2020-01-19 RX ADMIN — DORZOLAMIDE HCL 1 DROP: 20 SOLUTION/ DROPS OPHTHALMIC at 08:20

## 2020-01-19 RX ADMIN — BRIMONIDINE TARTRATE 1 DROP: 2 SOLUTION OPHTHALMIC at 08:20

## 2020-01-19 RX ADMIN — ONDANSETRON 4 MG: 2 INJECTION, SOLUTION INTRAMUSCULAR; INTRAVENOUS at 06:11

## 2020-01-19 RX ADMIN — HYDRALAZINE HYDROCHLORIDE 10 MG: 20 INJECTION INTRAMUSCULAR; INTRAVENOUS at 11:52

## 2020-01-19 RX ADMIN — METRONIDAZOLE 500 MG: 500 INJECTION, SOLUTION INTRAVENOUS at 09:03

## 2020-01-19 RX ADMIN — SODIUM CHLORIDE, PRESERVATIVE FREE 10 ML: 5 INJECTION INTRAVENOUS at 08:21

## 2020-01-19 RX ADMIN — FAMOTIDINE 20 MG: 10 INJECTION INTRAVENOUS at 09:03

## 2020-01-19 RX ADMIN — POTASSIUM CHLORIDE 10 MEQ: 7.46 INJECTION, SOLUTION INTRAVENOUS at 14:48

## 2020-01-19 RX ADMIN — POTASSIUM CHLORIDE 10 MEQ: 7.46 INJECTION, SOLUTION INTRAVENOUS at 15:51

## 2020-01-19 RX ADMIN — METRONIDAZOLE 500 MG: 500 INJECTION, SOLUTION INTRAVENOUS at 01:00

## 2020-01-19 RX ADMIN — TIMOLOL MALEATE 1 DROP: 5 SOLUTION OPHTHALMIC at 08:20

## 2020-01-19 RX ADMIN — DORZOLAMIDE HCL 1 DROP: 20 SOLUTION/ DROPS OPHTHALMIC at 21:08

## 2020-01-19 RX ADMIN — HYDRALAZINE HYDROCHLORIDE 10 MG: 20 INJECTION INTRAMUSCULAR; INTRAVENOUS at 00:42

## 2020-01-19 RX ADMIN — TIMOLOL MALEATE 1 DROP: 5 SOLUTION OPHTHALMIC at 21:09

## 2020-01-19 RX ADMIN — LATANOPROST 1 DROP: 50 SOLUTION/ DROPS OPHTHALMIC at 21:09

## 2020-01-19 RX ADMIN — LEVOFLOXACIN 500 MG: 5 INJECTION, SOLUTION INTRAVENOUS at 18:04

## 2020-01-19 NOTE — PROGRESS NOTES
"Hospitalist Team      Patient Care Team:  Laci Freedman MD as PCP - General (Family Medicine)        Chief Complaint:  Severe anxiety and agitation/ new    SBO    Acute diverticulitis    Subjective    Interval History and ROS:     New onset confusion/ Has not had her scheduled ativan for 2 days.     Still NPO/ NG out this afternoon/ but no change in diet/    Toxic Metabolic Encephalopathy   Add scheduled ativan 0.5 mg IV Q 8 hours.      Objective    Vital Signs  Temp:  [96.4 °F (35.8 °C)-98.5 °F (36.9 °C)] 98 °F (36.7 °C)  Heart Rate:  [] 82  Resp:  [16-18] 17  BP: (151-200)/(64-84) 173/79  Oxygen Therapy  SpO2: 96 %  Pulse Oximetry Type: Intermittent  Device (Oxygen Therapy): room air}  Flowsheet Rows      First Filed Value   Admission Height  139.7 cm (55\") Documented at 01/17/2020 1604   Admission Weight  65.8 kg (145 lb) Documented at 01/17/2020 1604            Physical Exam:    Physical Exam    Results Review:     I reviewed the patient's new clinical results.    Lab Results (last 24 hours)     Procedure Component Value Units Date/Time    Basic Metabolic Panel [[id number]]  (Abnormal) Collected:  01/19/20 0417    Specimen:  Blood Updated:  01/19/20 0530     Glucose 127 mg/dL      BUN 11 mg/dL      Creatinine 0.89 mg/dL      Sodium 138 mmol/L      Potassium 3.2 mmol/L      Chloride 99 mmol/L      CO2 23.0 mmol/L      Calcium 8.2 mg/dL      eGFR Non African Amer 60 mL/min/1.73      BUN/Creatinine Ratio 12.4     Anion Gap 16.0 mmol/L     Narrative:       GFR Normal >60  Chronic Kidney Disease <60  Kidney Failure <15      CBC & Differential [[id number]] Collected:  01/19/20 0417    Specimen:  Blood Updated:  01/19/20 0503    Narrative:       The following orders were created for panel order CBC & Differential.  Procedure                               Abnormality         Status                     ---------                               -----------         ------                     CBC Auto " Differential[[id number]]        Abnormal            Final result                 Please view results for these tests on the individual orders.    CBC Auto Differential [[id number]]  (Abnormal) Collected:  01/19/20 0417    Specimen:  Blood Updated:  01/19/20 0503     WBC 10.30 10*3/mm3      RBC 4.11 10*6/mm3      Hemoglobin 12.7 g/dL      Hematocrit 40.6 %      MCV 98.8 fL      MCH 30.9 pg      MCHC 31.3 g/dL      RDW 12.8 %      RDW-SD 46.2 fl      MPV 11.3 fL      Platelets 226 10*3/mm3      Neutrophil % 78.9 %      Lymphocyte % 15.0 %      Monocyte % 5.7 %      Eosinophil % 0.0 %      Basophil % 0.1 %      Immature Grans % 0.3 %      Neutrophils, Absolute 8.12 10*3/mm3      Lymphocytes, Absolute 1.55 10*3/mm3      Monocytes, Absolute 0.59 10*3/mm3      Eosinophils, Absolute 0.00 10*3/mm3      Basophils, Absolute 0.01 10*3/mm3      Immature Grans, Absolute 0.03 10*3/mm3      nRBC 0.0 /100 WBC     Troponin [[id number]]  (Normal) Collected:  01/18/20 2352    Specimen:  Blood Updated:  01/19/20 0023     Troponin T <0.010 ng/mL     Narrative:       Troponin T Reference Range:  <= 0.03 ng/mL-   Negative for AMI  >0.03 ng/mL-     Abnormal for myocardial necrosis.  Clinicians would have to utilize clinical acumen, EKG, Troponin and serial changes to determine if it is an Acute Myocardial Infarction or myocardial injury due to an underlying chronic condition.       Results may be falsely decreased if patient taking Biotin.            Imaging Results (Last 24 Hours)     Procedure Component Value Units Date/Time    XR Abdomen KUB [[id number]] Collected:  01/19/20 0831     Updated:  01/19/20 0833    Narrative:       CR Abdomen 1 Vw    INDICATION:   Follow-up small bowel obstruction    COMPARISON:   1/18/2020 FINDINGS:  AP radiographs of the abdomen. The renal shadows are symmetric. No renal calculi. No bladder calculi.    The bowel gas pattern is nonobstructive. No acute osseous abnormalities. A nasogastric tube tip is in the  antrum of stomach      Impression:       Bowel gas pattern is normal. The nasogastric tube appears in good position    Signer Name: Ricky Ingram MD   Signed: 1/19/2020 8:31 AM   Workstation Name: [id number]    [facility]          Xray not reviewed personally by physician.      ECG not reviewed personally by physician  ECG/EMG Results (most recent)     Procedure Component Value Units Date/Time    ECG 12 Lead [[id number]] Collected:  01/18/20 2336     Updated:  01/19/20 1256    Narrative:       HEART RATE= 90  bpm  RR Interval= 668  ms  AZ Interval= 157  ms  P Horizontal Axis= -6  deg  P Front Axis= 41  deg  QRSD Interval= 184  ms  QT Interval= 400  ms  QRS Axis= -27  deg  T Wave Axis= 1  deg  - ABNORMAL ECG -  Sinus rhythm  Ventricular premature complex  POOR R WAVE PROGRESSION  NO SIGNIFICANT CHANGE FROM PREVIOUS ECG  Electronically Signed By: Dharmesh Douglas ([facility]) 19-Jan-2020 12:56:32  Date and Time of Study: 2020-01-18 23:36:57          Medication Review:   I have reviewed the patient's current medication list    Current Facility-Administered Medications:   •  acetaminophen (TYLENOL) tablet 650 mg, 650 mg, Oral, Q4H PRN **OR** acetaminophen (TYLENOL) 160 MG/5ML solution 650 mg, 650 mg, Oral, Q4H PRN **OR** acetaminophen (TYLENOL) suppository 650 mg, 650 mg, Rectal, Q4H PRN, Milton Cummings MD  •  brimonidine (ALPHAGAN) 0.2 % ophthalmic solution 1 drop, 1 drop, Right Eye, BID, Mery Juarez DO, 1 drop at 01/19/20 0820  •  calcium carbonate (TUMS) chewable tablet 500 mg (200 mg elemental), 1 tablet, Oral, BID PRN, Milton Cummings MD  •  dorzolamide (TRUSOPT) 2 % ophthalmic solution 1 drop, 1 drop, Right Eye, BID, Mery Juarez DO, 1 drop at 01/19/20 0820  •  famotidine (PEPCID) injection 20 mg, 20 mg, Intravenous, Daily, Milton Cummings MD, 20 mg at 01/19/20 0903  •  hydrALAZINE (APRESOLINE) injection 10 mg, 10 mg, Intravenous, Q4H PRNLarry,  DO Mery, 10 mg at 01/19/20 1152  •  lactated ringers infusion, 100 mL/hr, Intravenous, Continuous, Mitlon Cummings MD, Last Rate: 100 mL/hr at 01/19/20 0612, 100 mL/hr at 01/19/20 0612  •  latanoprost (XALATAN) 0.005 % ophthalmic solution 1 drop, 1 drop, Right Eye, Nightly, Mery Juarez DO, 1 drop at 01/18/20 2021  •  levoFLOXacin (LEVAQUIN) 500 mg/100 mL D5W (premix) (LEVAQUIN) 500 mg, 500 mg, Intravenous, Q24H, Milton Cummings MD, 500 mg at 01/18/20 2018  •  LORazepam (ATIVAN) injection 0.5 mg, 0.5 mg, Intravenous, Q8H, Mery Juarez DO, 0.5 mg at 01/19/20 0819  •  metroNIDAZOLE (FLAGYL) 500 mg/100mL IVPB, 500 mg, Intravenous, Once, Boyd Cox MD  •  metroNIDAZOLE (FLAGYL) 500 mg/100mL IVPB, 500 mg, Intravenous, Q8H, Milton Cummings MD, 500 mg at 01/19/20 0903  •  nitroglycerin (NITROSTAT) SL tablet 0.4 mg, 0.4 mg, Sublingual, Q5 Min PRN, Milton Cummings MD  •  ondansetron (ZOFRAN) tablet 4 mg, 4 mg, Oral, Q6H PRN **OR** ondansetron (ZOFRAN) injection 4 mg, 4 mg, Intravenous, Q6H PRN, Milton Cummings MD, 4 mg at 01/19/20 0611  •  phenol (CHLORASEPTIC) 1.4 % liquid 2 spray, 2 spray, Mouth/Throat, Q2H PRN, Mery Juarez DO, 2 spray at 01/18/20 1832  •  potassium chloride (K-DUR,KLOR-CON) CR tablet 40 mEq, 40 mEq, Oral, PRN **OR** potassium chloride (KLOR-CON) packet 40 mEq, 40 mEq, Oral, PRN **OR** potassium chloride 10 mEq in 100 mL IVPB, 10 mEq, Intravenous, Q1H PRN, Mery Juarez DO, Last Rate: 100 mL/hr at 01/19/20 1339, 10 mEq at 01/19/20 1339  •  sodium chloride 0.9 % flush 1-10 mL, 1-10 mL, Intravenous, PRN, Milton Cummings MD  •  [COMPLETED] Insert peripheral IV, , , Once **AND** sodium chloride 0.9 % flush 10 mL, 10 mL, Intravenous, PRN, Boyd Cox MD  •  sodium chloride 0.9 % flush 10 mL, 10 mL, Intravenous, Q12H, Milton Cummings MD, 10 mL at 01/19/20 0821  •  sodium chloride 0.9 %  infusion 40 mL, 40 mL, Intravenous, PRN, Milton Cummings MD  •  timolol (TIMOPTIC) 0.5 % ophthalmic solution 1 drop, 1 drop, Right Eye, Q12H, Mery Juarez DO, 1 drop at 01/19/20 0820      Assessment/Plan     New onset confusion/ Has not had her scheduled ativan for 2 days.     Still NPO/ NG out this afternoon/ but no change in diet/    Toxic Metabolic Encephalopathy   Add scheduled ativan 0.5 mg IV Q 8 hours.    Hypokalemia/ replacement ordered       Acute sigmoid diverticulitis/  On antibiotics for this     Acute partial small bowel obstruction with NG removed today/ however, still clear liquids     Essential hypertension controlled     Hypothyroidism resume the levothyroxine tomorrow with diet ordered     Generalized anxiety disorder/ Takes ativan scheduled/ see above  r  Recent bronchitis treated with Z-Osorio    C. difficile was ordered secondary to the patient having a his history of C. difficile colitis in the past.  No stools per nurse.               Plan for disposition:  Continue antibiotics and surgeon may advance diet tomorrow    Mery Juarez DO  01/19/20  1:53 PM      Time: 30 min

## 2020-01-19 NOTE — PROGRESS NOTES
Surgery Progress Note   Chief Complaint:  Diverticulitis and sbo    Subjective     Interval History:     Paulo is confused today.  She take Ativan regularly and has not gotten it since admission.  She says she does not feel well today.        Objective     Vital Signs  Temp:  [96.4 °F (35.8 °C)-98.5 °F (36.9 °C)] 98 °F (36.7 °C)  Heart Rate:  [] 82  Resp:  [16-18] 17  BP: (151-200)/(64-84) 173/79  Body mass index is 34.51 kg/m².    Intake/Output Summary (Last 24 hours) at 1/19/2020 1339  Last data filed at 1/19/2020 1231  Gross per 24 hour   Intake 1463.33 ml   Output 1350 ml   Net 113.33 ml     I/O this shift:  In: 100 [IV Piggyback:100]  Out: -        Physical Exam:   General: patient awake, anxious, disoriented    Abdomen: soft, hypoactive bowel sounds, LLQ tenderness   Extremities: no rash or edema   Neurologic: confused and anxious     Results Review:     I reviewed the patient's new clinical results.      WBC No results found for: WBCS   HGB Hemoglobin   Date Value Ref Range Status   01/19/2020 12.7 12.0 - 15.9 g/dL Final   01/18/2020 12.6 12.0 - 15.9 g/dL Final   01/17/2020 13.9 12.0 - 15.9 g/dL Final      HCT Hematocrit   Date Value Ref Range Status   01/19/2020 40.6 34.0 - 46.6 % Final   01/18/2020 40.7 34.0 - 46.6 % Final   01/17/2020 43.2 34.0 - 46.6 % Final      Platlets No results found for: LABPLAT     PT/INR:  No results found for: PROTIME/No results found for: INR    Sodium Sodium   Date Value Ref Range Status   01/19/2020 138 136 - 145 mmol/L Final   01/18/2020 137 136 - 145 mmol/L Final   01/17/2020 139 136 - 145 mmol/L Final      Potassium Potassium   Date Value Ref Range Status   01/19/2020 3.2 (L) 3.5 - 5.2 mmol/L Final   01/18/2020 4.3 3.5 - 5.2 mmol/L Final   01/17/2020 4.3 3.5 - 5.2 mmol/L Final      Chloride Chloride   Date Value Ref Range Status   01/19/2020 99 98 - 107 mmol/L Final   01/18/2020 101 98 - 107 mmol/L Final   01/17/2020 101 98 - 107 mmol/L Final       Bicarbonate No results found for: PLASMABICARB   BUN BUN   Date Value Ref Range Status   01/19/2020 11 8 - 23 mg/dL Final   01/18/2020 16 8 - 23 mg/dL Final   01/17/2020 17 8 - 23 mg/dL Final      Creatinine Creatinine   Date Value Ref Range Status   01/19/2020 0.89 0.57 - 1.00 mg/dL Final   01/18/2020 0.83 0.57 - 1.00 mg/dL Final   01/17/2020 0.88 0.57 - 1.00 mg/dL Final      Calcium Calcium   Date Value Ref Range Status   01/19/2020 8.2 (L) 8.6 - 10.5 mg/dL Final   01/18/2020 8.6 8.6 - 10.5 mg/dL Final   01/17/2020 9.6 8.6 - 10.5 mg/dL Final      Magnesium  AST  ALT  Bilirubin, Total  AlkPhos  Albumin    Amylase  Lipase    Radiology: No results found for: MG  No components found for: AST.*  No components found for: ALT.*  No components found for: BILIRUBIN, TOTAL.*    No components found for: ALKPHOS.*  No components found for: ALBUMIN.*      No components found for: AMYLASE.*  No components found for: LIPASE.*            Imaging Results (Most Recent)     Procedure Component Value Units Date/Time    XR Abdomen KUB [[id number]] Collected:  01/19/20 0831     Updated:  01/19/20 0833    Narrative:       CR Abdomen 1 Vw    INDICATION:   Follow-up small bowel obstruction    COMPARISON:   1/18/2020 FINDINGS:  AP radiographs of the abdomen. The renal shadows are symmetric. No renal calculi. No bladder calculi.    The bowel gas pattern is nonobstructive. No acute osseous abnormalities. A nasogastric tube tip is in the antrum of stomach      Impression:       Bowel gas pattern is normal. The nasogastric tube appears in good position    Signer Name: Ricky Ingram MD   Signed: 1/19/2020 8:31 AM   Workstation Name: [email or web address]    [facility]    XR Abdomen KUB [[id number]] Collected:  01/18/20 0856     Updated:  01/18/20 0858    Narrative:       CR Abdomen 1 Vw    INDICATION:   Follow-up small bowel obstruction    COMPARISON:   CT abdomen pelvis 1/17/2020    FINDINGS:  AP radiographs of the abdomen. The  renal shadows are symmetric. No renal calculi. No bladder calculi.    There is gas visible within couple of loops of small intestine that are slightly distended. It appears improved as compared with the scalp from yesterday's CT scan. The colon is not distended No acute osseous abnormalities. Surgical clips are present in  the gallbladder fossa. A nasogastric tube has its tip in the antrum of stomach      Impression:       Bowel gas pattern appears improved as compared with  film from CT scan yesterday.    Nasogastric tube is in good position    Signer Name: Ricky Ingram MD   Signed: 1/18/2020 8:56 AM   Workstation Name: [id number]    [facility]    CT Abdomen Pelvis With Contrast [[id number]] Collected:  01/17/20 1743     Updated:  01/17/20 1757    Narrative:       CT Abdomen Pelvis W 1/17/2020    INDICATION:   Epigastric abdominal pain, periumbilical abdominal pain and heartburn beginning today.    TECHNIQUE:   CT of the abdomen and pelvis with IV contrast. Coronal and sagittal reconstructions were obtained.  Radiation dose reduction techniques included automated exposure control or exposure modulation based on body size. Count of known CT and cardiac nuc med  studies performed in previous 12 months: 3.     COMPARISON:   12/13/2019    FINDINGS:  Abdomen: The liver is normal in appearance. Calcified granulomas are seen in the spleen. The pancreas, adrenal glands and right kidney are normal. Left renal cyst is noted. The gallbladder is surgically absent.    Pelvis: There is moderate dilatation of the proximal and mid small bowel with air-fluid levels identified. There is a transition zone in the mid abdomen. The remaining small bowel and the colon are relatively decompressed. Findings suggest mechanical  small bowel obstruction. No cause for the obstruction is seen on this examination. Consider adhesions. Colonic diverticulosis with inflammatory stranding in the fat adjacent to the sigmoid  colon characteristic of diverticulitis. No abscess is seen. No  significant adenopathy. No free fluid in the abdomen or pelvis. Lung bases are unremarkable.      Impression:         1. Sigmoid diverticulitis. No evidence of abscess.  2. Findings suggest mechanical small bowel obstruction in the mid abdomen as detailed above. Consider adhesions.  3. Surgical absence of the gallbladder.  4. Left renal cyst.          Signer Name: Kris Quintana MD   Signed: 1/17/2020 5:43 PM   Workstation Name: [id number]    [facility]               lactated ringers 100 mL/hr Last Rate: 100 mL/hr (01/19/20 0612)         Assessment/Plan     Patient Active Problem List   Diagnosis Code   • Small bowel obstruction (CMS/HCC) K56.609   • Diverticulitis K57.92   • HTN (hypertension), benign I10   • GERD without esophagitis K21.9   • Hypothyroidism E03.9   • Hyperlipidemia E78.5   • Anxiety disorder F41.9       Diverticulitis  sbo  --it appears like her sbo has resolved.  I will discontinue the NG tube but hold off on starting a diet due to the confusion today.  I have encouraged ambulation.  Her potassium is being replaced.      Kathia Mendoza,   01/19/20  1:39 PM

## 2020-01-19 NOTE — PLAN OF CARE
Pt noted with increased confusion and agitation this shift, with attempts of pulling out iv and ngt. Md aware and family at bedside, home dose ativan restarted. SBO resolved and ngt discontinued,remains npo except for ice chips. Potassium replaced per protocol and follow up lab scheduled. Hydralazine given x 1 for sbp >160.

## 2020-01-20 LAB
ANION GAP SERPL CALCULATED.3IONS-SCNC: 11.5 MMOL/L (ref 5–15)
BILIRUB UR QL STRIP: NEGATIVE
BUN BLD-MCNC: 15 MG/DL (ref 8–23)
BUN/CREAT SERPL: 19.7 (ref 7–25)
CALCIUM SPEC-SCNC: 7.8 MG/DL (ref 8.6–10.5)
CHLORIDE SERPL-SCNC: 104 MMOL/L (ref 98–107)
CLARITY UR: CLEAR
CO2 SERPL-SCNC: 22.5 MMOL/L (ref 22–29)
COLOR UR: YELLOW
CREAT BLD-MCNC: 0.76 MG/DL (ref 0.57–1)
GFR SERPL CREATININE-BSD FRML MDRD: 72 ML/MIN/1.73
GLUCOSE BLD-MCNC: 106 MG/DL (ref 65–99)
GLUCOSE UR STRIP-MCNC: NEGATIVE MG/DL
HGB UR QL STRIP.AUTO: NEGATIVE
KETONES UR QL STRIP: ABNORMAL
LEUKOCYTE ESTERASE UR QL STRIP.AUTO: NEGATIVE
NITRITE UR QL STRIP: NEGATIVE
PH UR STRIP.AUTO: 7.5 [PH] (ref 4.5–8)
POTASSIUM BLD-SCNC: 3.6 MMOL/L (ref 3.5–5.2)
PROT UR QL STRIP: NEGATIVE
SODIUM BLD-SCNC: 138 MMOL/L (ref 136–145)
SP GR UR STRIP: 1.02 (ref 1–1.03)
UROBILINOGEN UR QL STRIP: ABNORMAL

## 2020-01-20 PROCEDURE — 81003 URINALYSIS AUTO W/O SCOPE: CPT | Performed by: INTERNAL MEDICINE

## 2020-01-20 PROCEDURE — 99232 SBSQ HOSP IP/OBS MODERATE 35: CPT | Performed by: HOSPITALIST

## 2020-01-20 PROCEDURE — 25010000002 LEVOFLOXACIN PER 250 MG: Performed by: HOSPITALIST

## 2020-01-20 PROCEDURE — 25010000002 LORAZEPAM PER 2 MG: Performed by: HOSPITALIST

## 2020-01-20 PROCEDURE — 99231 SBSQ HOSP IP/OBS SF/LOW 25: CPT | Performed by: SURGERY

## 2020-01-20 PROCEDURE — 80048 BASIC METABOLIC PNL TOTAL CA: CPT | Performed by: FAMILY MEDICINE

## 2020-01-20 RX ORDER — LEVOTHYROXINE SODIUM 0.05 MG/1
50 TABLET ORAL DAILY
Status: DISCONTINUED | OUTPATIENT
Start: 2020-01-20 | End: 2020-01-21 | Stop reason: HOSPADM

## 2020-01-20 RX ORDER — LORAZEPAM 1 MG/1
1 TABLET ORAL 3 TIMES DAILY
Status: DISCONTINUED | OUTPATIENT
Start: 2020-01-20 | End: 2020-01-21 | Stop reason: HOSPADM

## 2020-01-20 RX ADMIN — TIMOLOL MALEATE 1 DROP: 5 SOLUTION OPHTHALMIC at 21:14

## 2020-01-20 RX ADMIN — LORAZEPAM 0.5 MG: 2 INJECTION INTRAMUSCULAR; INTRAVENOUS at 08:33

## 2020-01-20 RX ADMIN — METRONIDAZOLE 500 MG: 500 INJECTION, SOLUTION INTRAVENOUS at 18:20

## 2020-01-20 RX ADMIN — BRIMONIDINE TARTRATE 1 DROP: 2 SOLUTION OPHTHALMIC at 08:34

## 2020-01-20 RX ADMIN — BRIMONIDINE TARTRATE 1 DROP: 2 SOLUTION OPHTHALMIC at 21:13

## 2020-01-20 RX ADMIN — SODIUM CHLORIDE, PRESERVATIVE FREE 10 ML: 5 INJECTION INTRAVENOUS at 08:35

## 2020-01-20 RX ADMIN — METRONIDAZOLE 500 MG: 500 INJECTION, SOLUTION INTRAVENOUS at 09:11

## 2020-01-20 RX ADMIN — LATANOPROST 1 DROP: 50 SOLUTION/ DROPS OPHTHALMIC at 21:14

## 2020-01-20 RX ADMIN — SODIUM CHLORIDE, PRESERVATIVE FREE 10 ML: 5 INJECTION INTRAVENOUS at 21:24

## 2020-01-20 RX ADMIN — LORAZEPAM 0.5 MG: 2 INJECTION INTRAMUSCULAR; INTRAVENOUS at 02:05

## 2020-01-20 RX ADMIN — DILTIAZEM HYDROCHLORIDE 30 MG: 30 TABLET, FILM COATED ORAL at 21:13

## 2020-01-20 RX ADMIN — LORAZEPAM 1 MG: 1 TABLET ORAL at 21:13

## 2020-01-20 RX ADMIN — LORAZEPAM 1 MG: 1 TABLET ORAL at 18:20

## 2020-01-20 RX ADMIN — LEVOFLOXACIN 500 MG: 5 INJECTION, SOLUTION INTRAVENOUS at 21:13

## 2020-01-20 RX ADMIN — ACETAMINOPHEN 650 MG: 325 TABLET, FILM COATED ORAL at 21:13

## 2020-01-20 RX ADMIN — TIMOLOL MALEATE 1 DROP: 5 SOLUTION OPHTHALMIC at 08:34

## 2020-01-20 RX ADMIN — METRONIDAZOLE 500 MG: 500 INJECTION, SOLUTION INTRAVENOUS at 02:05

## 2020-01-20 RX ADMIN — DORZOLAMIDE HCL 1 DROP: 20 SOLUTION/ DROPS OPHTHALMIC at 08:34

## 2020-01-20 RX ADMIN — DORZOLAMIDE HCL 1 DROP: 20 SOLUTION/ DROPS OPHTHALMIC at 21:13

## 2020-01-20 RX ADMIN — LEVOTHYROXINE SODIUM 50 MCG: 50 TABLET ORAL at 12:56

## 2020-01-20 RX ADMIN — DILTIAZEM HYDROCHLORIDE 30 MG: 30 TABLET, FILM COATED ORAL at 12:56

## 2020-01-20 RX ADMIN — FAMOTIDINE 20 MG: 10 INJECTION INTRAVENOUS at 08:34

## 2020-01-20 NOTE — PROGRESS NOTES
Surgery Progress Note   Chief Complaint:  Diverticulitis, sbo    Subjective     Interval History:     Paulo is less confused today.  She has tolerated the NG tube being out.  She says she has passed flatus but no bm.  She denies nausea.  Her pain has improved.      Objective     Vital Signs  Temp:  [96.8 °F (36 °C)-98 °F (36.7 °C)] 97.4 °F (36.3 °C)  Heart Rate:  [] 106  Resp:  [17-20] 20  BP: (161-173)/(74-79) 161/74  Body mass index is 34.51 kg/m².    Intake/Output Summary (Last 24 hours) at 1/20/2020 0754  Last data filed at 1/20/2020 0202  Gross per 24 hour   Intake 1100 ml   Output 2000 ml   Net -900 ml     No intake/output data recorded.       Physical Exam:   General: patient awake, alert and cooperative   Abdomen: soft, good bowel sounds, mild llq tenderness   Extremities: no rash or edema   Neurologic: Normal mood and behavior     Results Review:     I reviewed the patient's new clinical results.      WBC No results found for: WBCS   HGB Hemoglobin   Date Value Ref Range Status   01/19/2020 12.7 12.0 - 15.9 g/dL Final   01/18/2020 12.6 12.0 - 15.9 g/dL Final   01/17/2020 13.9 12.0 - 15.9 g/dL Final      HCT Hematocrit   Date Value Ref Range Status   01/19/2020 40.6 34.0 - 46.6 % Final   01/18/2020 40.7 34.0 - 46.6 % Final   01/17/2020 43.2 34.0 - 46.6 % Final      Platlets No results found for: LABPLAT     PT/INR:  No results found for: PROTIME/No results found for: INR    Sodium Sodium   Date Value Ref Range Status   01/20/2020 138 136 - 145 mmol/L Final   01/19/2020 138 136 - 145 mmol/L Final   01/18/2020 137 136 - 145 mmol/L Final   01/17/2020 139 136 - 145 mmol/L Final      Potassium Potassium   Date Value Ref Range Status   01/20/2020 3.6 3.5 - 5.2 mmol/L Final   01/19/2020 3.8 3.5 - 5.2 mmol/L Final   01/19/2020 3.2 (L) 3.5 - 5.2 mmol/L Final   01/18/2020 4.3 3.5 - 5.2 mmol/L Final   01/17/2020 4.3 3.5 - 5.2 mmol/L Final      Chloride Chloride   Date Value Ref Range Status   01/20/2020  104 98 - 107 mmol/L Final   01/19/2020 99 98 - 107 mmol/L Final   01/18/2020 101 98 - 107 mmol/L Final   01/17/2020 101 98 - 107 mmol/L Final      Bicarbonate No results found for: PLASMABICARB   BUN BUN   Date Value Ref Range Status   01/20/2020 15 8 - 23 mg/dL Final   01/19/2020 11 8 - 23 mg/dL Final   01/18/2020 16 8 - 23 mg/dL Final   01/17/2020 17 8 - 23 mg/dL Final      Creatinine Creatinine   Date Value Ref Range Status   01/20/2020 0.76 0.57 - 1.00 mg/dL Final   01/19/2020 0.89 0.57 - 1.00 mg/dL Final   01/18/2020 0.83 0.57 - 1.00 mg/dL Final   01/17/2020 0.88 0.57 - 1.00 mg/dL Final      Calcium Calcium   Date Value Ref Range Status   01/20/2020 7.8 (L) 8.6 - 10.5 mg/dL Final   01/19/2020 8.2 (L) 8.6 - 10.5 mg/dL Final   01/18/2020 8.6 8.6 - 10.5 mg/dL Final   01/17/2020 9.6 8.6 - 10.5 mg/dL Final      Magnesium  AST  ALT  Bilirubin, Total  AlkPhos  Albumin    Amylase  Lipase    Radiology: No results found for: MG  No components found for: AST.*  No components found for: ALT.*  No components found for: BILIRUBIN, TOTAL.*    No components found for: ALKPHOS.*  No components found for: ALBUMIN.*      No components found for: AMYLASE.*  No components found for: LIPASE.*            Imaging Results (Most Recent)     Procedure Component Value Units Date/Time    XR Abdomen KUB [[id number]] Collected:  01/19/20 0831     Updated:  01/19/20 0833    Narrative:       CR Abdomen 1 Vw    INDICATION:   Follow-up small bowel obstruction    COMPARISON:   1/18/2020 FINDINGS:  AP radiographs of the abdomen. The renal shadows are symmetric. No renal calculi. No bladder calculi.    The bowel gas pattern is nonobstructive. No acute osseous abnormalities. A nasogastric tube tip is in the antrum of stomach      Impression:       Bowel gas pattern is normal. The nasogastric tube appears in good position    Signer Name: Ricky Ingram MD   Signed: 1/19/2020 8:31 AM   Workstation [facility]: [facility]    [facility]     XR Abdomen KUB [[id number]] Collected:  01/18/20 0856     Updated:  01/18/20 0858    Narrative:       CR Abdomen 1 Vw    INDICATION:   Follow-up small bowel obstruction    COMPARISON:   CT abdomen pelvis 1/17/2020    FINDINGS:  AP radiographs of the abdomen. The renal shadows are symmetric. No renal calculi. No bladder calculi.    There is gas visible within couple of loops of small intestine that are slightly distended. It appears improved as compared with the scalp from yesterday's CT scan. The colon is not distended No acute osseous abnormalities. Surgical clips are present in  the gallbladder fossa. A nasogastric tube has its tip in the antrum of stomach      Impression:       Bowel gas pattern appears improved as compared with  film from CT scan yesterday.    Nasogastric tube is in good position    Signer Name: Ricky Ingram MD   Signed: 1/18/2020 8:56 AM   Workstation Name: [email or web address]    [facility] [place]    CT Abdomen Pelvis With Contrast [[id number]] Collected:  01/17/20 1743     Updated:  01/17/20 1757    Narrative:       CT Abdomen Pelvis W 1/17/2020    INDICATION:   Epigastric abdominal pain, periumbilical abdominal pain and heartburn beginning today.    TECHNIQUE:   CT of the abdomen and pelvis with IV contrast. Coronal and sagittal reconstructions were obtained.  Radiation dose reduction techniques included automated exposure control or exposure modulation based on body size. Count of known CT and cardiac nuc med  studies performed in previous 12 months: 3.     COMPARISON:   12/13/2019    FINDINGS:  Abdomen: The liver is normal in appearance. Calcified granulomas are seen in the spleen. The pancreas, adrenal glands and right kidney are normal. Left renal cyst is noted. The gallbladder is surgically absent.    Pelvis: There is moderate dilatation of the proximal and mid small bowel with air-fluid levels identified. There is a transition zone in the mid abdomen. The remaining small  bowel and the colon are relatively decompressed. Findings suggest mechanical  small bowel obstruction. No cause for the obstruction is seen on this examination. Consider adhesions. Colonic diverticulosis with inflammatory stranding in the fat adjacent to the sigmoid colon characteristic of diverticulitis. No abscess is seen. No  significant adenopathy. No free fluid in the abdomen or pelvis. Lung bases are unremarkable.      Impression:         1. Sigmoid diverticulitis. No evidence of abscess.  2. Findings suggest mechanical small bowel obstruction in the mid abdomen as detailed above. Consider adhesions.  3. Surgical absence of the gallbladder.  4. Left renal cyst.          Signer Name: Kris Quintana MD   Signed: 1/17/2020 5:43 PM   Workstation Name: [id number]    [facility] [place] [facility]                    Assessment/Plan     Patient Active Problem List   Diagnosis Code   • Small bowel obstruction (CMS/HCC) K56.609   • Diverticulitis K57.92   • HTN (hypertension), benign I10   • GERD without esophagitis K21.9   • Hypothyroidism E03.9   • Hyperlipidemia E78.5   • Anxiety disorder F41.9       Diverticulitis--improved  sbo--resolved    I will start clears and discontinue her IV fluids.  I have encouraged ambulation.  Patient's granddaughter is wanting her grandmother placed in skilled for a few weeks.        Kathia Mendoza DO  01/20/20  7:54 AM.

## 2020-01-20 NOTE — NURSING NOTE
"Continued Stay Note   Dania Boland     Patient Name: Paulo Hampton  MRN: [id number]  Today's Date: 1/20/2020    Admit Date: 1/17/2020    Discharge Plan     Row Name 01/20/20 1247       Plan    Plan  Home with son Eleazar    Provided post acute provider list?  Yes    Post Acute Provider List  Home Health;Inpatient Rehab;Long Term Acute Care    Post Acute Provider Quality & Resource List  Yes    Delivered To  Support Person    Support Person  Suki, granddaughter    Method of Delivery  In person    Patient/Family in Agreement with Plan  yes    Plan Comments  Asked to see patient and granddaughter regarding short term rehab per MD's note. Into room and introduced self and role of CM. Spoke with patient and her granddaughter with permission regarding need for short term rehab. Granddaughter states \"I have been told by two people that as far as she is walking they did not think her insurance would pay for it\". Discussed with patient and granddaughter that her insurance would require a precert and that they did look at PT/OT notes to determine if she qualifies. Granddaughter states \"well if it's not covered her son Eleazar said she can stay with him for a few days\". Patient and granddaughter are agreeable to go to her son's house at discharge and is no longer interested in SNF\". Updated primary nurse Martínez of such. CM did provide home health list, SNF and LTAC lists for their use if needed. Patient and granddaughter had not other questions or concerns regarding discharge plans at this time. Name and number placed on white board in room. CM will continue to follow for needs.        Discharge Codes    No documentation.             Anita Madden RN    "

## 2020-01-20 NOTE — PLAN OF CARE
Problem: Patient Care Overview  Goal: Plan of Care Review  Flowsheets (Taken 1/20/2020 0633)  Progress: no change  Plan of Care Reviewed With: patient; family  Outcome Summary: Patient remains confused, but reorients easily. Collected UA d/t frequent urination and recent mental status change. Ativan continued

## 2020-01-20 NOTE — PROGRESS NOTES
"Hospitalist Team      Patient Care Team:  Laci Freedman MD as PCP - General (Family Medicine)        Chief Complaint: Follow-up Acute Diverticulitis; SBO    Subjective    No acute events overnight.  Staff report not as confused as yesterday.  Ms. Hampton reports she actually feels quite well this morning.  Her abdomen is not as \"tight\", and she is tolerating her clear liquid breakfast.  She has been ambulating w/ staff.  She denies chest pain and dyspnea.    Objective    Vital Signs  Temp:  [96.8 °F (36 °C)-98 °F (36.7 °C)] 97.4 °F (36.3 °C)  Heart Rate:  [] 106  Resp:  [17-20] 20  BP: (161-173)/(74-79) 161/74  Oxygen Therapy  SpO2: 96 %  Pulse Oximetry Type: Intermittent  Device (Oxygen Therapy): room air}    Flowsheet Rows      First Filed Value   Admission Height  139.7 cm (55\") Documented at 01/17/2020 1604   Admission Weight  65.8 kg (145 lb) Documented at 01/17/2020 1604          Physical Exam:    General: Appears stated age in NAD  Lungs: Diminished throughout all fields.  No wheeze or rales appreciated.  CV: Regular w/o murmur.  Radial pulses are 2+ and symmetric.  Abdomen: Soft and non-tender w/ active bowel sounds  MSK: No C/C/E  Neuro: CN II-XII grossly intact  Psych: Pleasant affect    Results Review:     I reviewed the patient's new clinical results.    Lab Results (last 24 hours)     Procedure Component Value Units Date/Time    Basic Metabolic Panel [[id number]]  (Abnormal) Collected:  01/20/20 0405    Specimen:  Blood Updated:  01/20/20 0504     Glucose 106 mg/dL      BUN 15 mg/dL      Creatinine 0.76 mg/dL      Sodium 138 mmol/L      Potassium 3.6 mmol/L      Chloride 104 mmol/L      CO2 22.5 mmol/L      Calcium 7.8 mg/dL      eGFR Non African Amer 72 mL/min/1.73      BUN/Creatinine Ratio 19.7     Anion Gap 11.5 mmol/L     Narrative:       GFR Normal >60  Chronic Kidney Disease <60  Kidney Failure <15      Urinalysis With Microscopic If Indicated (No Culture) - Urine, Clean Catch [[id number]]  " (Abnormal) Collected:  01/20/20 0205    Specimen:  Urine, Clean Catch Updated:  01/20/20 0214     Color, UA Yellow     Appearance, UA Clear     pH, UA 7.5     Specific Gravity, UA 1.020     Glucose, UA Negative     Ketones, UA 40 mg/dL (2+)     Bilirubin, UA Negative     Blood, UA Negative     Protein, UA Negative     Leuk Esterase, UA Negative     Nitrite, UA Negative     Urobilinogen, UA 0.2 E.U./dL    Narrative:       Urine microscopic not indicated.    Potassium [[id number]]  (Normal) Collected:  01/19/20 1946    Specimen:  Blood Updated:  01/19/20 2013     Potassium 3.8 mmol/L           Imaging Results (Last 24 Hours)     ** No results found for the last 24 hours. **            Medication Review:   I have reviewed the patient's current medication list    Current Facility-Administered Medications:   •  acetaminophen (TYLENOL) tablet 650 mg, 650 mg, Oral, Q4H PRN **OR** acetaminophen (TYLENOL) 160 MG/5ML solution 650 mg, 650 mg, Oral, Q4H PRN **OR** acetaminophen (TYLENOL) suppository 650 mg, 650 mg, Rectal, Q4H PRN, Milton Cummings MD  •  brimonidine (ALPHAGAN) 0.2 % ophthalmic solution 1 drop, 1 drop, Right Eye, BID, Mery Juarez DO, 1 drop at 01/20/20 0834  •  calcium carbonate (TUMS) chewable tablet 500 mg (200 mg elemental), 1 tablet, Oral, BID PRN, Milton Cummings MD  •  dorzolamide (TRUSOPT) 2 % ophthalmic solution 1 drop, 1 drop, Right Eye, BID, Mery Juarez DO, 1 drop at 01/20/20 0834  •  famotidine (PEPCID) injection 20 mg, 20 mg, Intravenous, Daily, Milton Cummings MD, 20 mg at 01/20/20 0834  •  hydrALAZINE (APRESOLINE) injection 10 mg, 10 mg, Intravenous, Q4H PRN, Mery Juarez DO, 10 mg at 01/19/20 2107  •  latanoprost (XALATAN) 0.005 % ophthalmic solution 1 drop, 1 drop, Right Eye, Nightly, Mery Juarez DO, 1 drop at 01/19/20 4589  •  levoFLOXacin (LEVAQUIN) 500 mg/100 mL D5W (premix) (LEVAQUIN) 500 mg, 500 mg, Intravenous, Q24H,  Milton Cummings MD, 500 mg at 01/19/20 1804  •  LORazepam (ATIVAN) injection 0.5 mg, 0.5 mg, Intravenous, Q8H, Mery Juarez DO, 0.5 mg at 01/20/20 0833  •  metroNIDAZOLE (FLAGYL) 500 mg/100mL IVPB, 500 mg, Intravenous, Once, Boyd Cox MD  •  metroNIDAZOLE (FLAGYL) 500 mg/100mL IVPB, 500 mg, Intravenous, Q8H, Milton Cummings MD, Last Rate: 0 mL/hr at 01/19/20 1800, 500 mg at 01/20/20 0911  •  nitroglycerin (NITROSTAT) SL tablet 0.4 mg, 0.4 mg, Sublingual, Q5 Min PRN, Milton Cmumings MD  •  ondansetron (ZOFRAN) tablet 4 mg, 4 mg, Oral, Q6H PRN **OR** ondansetron (ZOFRAN) injection 4 mg, 4 mg, Intravenous, Q6H PRN, Milton Cummings MD, 4 mg at 01/19/20 0611  •  phenol (CHLORASEPTIC) 1.4 % liquid 2 spray, 2 spray, Mouth/Throat, Q2H PRN, Mery Juarez DO, 2 spray at 01/18/20 1832  •  potassium chloride (K-DUR,KLOR-CON) CR tablet 40 mEq, 40 mEq, Oral, PRN **OR** potassium chloride (KLOR-CON) packet 40 mEq, 40 mEq, Oral, PRN **OR** potassium chloride 10 mEq in 100 mL IVPB, 10 mEq, Intravenous, Q1H PRN, Mery Juarez DO, Stopped at 01/19/20 1653  •  sodium chloride 0.9 % flush 1-10 mL, 1-10 mL, Intravenous, PRN, Milton Cummings MD  •  [COMPLETED] Insert peripheral IV, , , Once **AND** sodium chloride 0.9 % flush 10 mL, 10 mL, Intravenous, PRN, Boyd Cox MD  •  sodium chloride 0.9 % flush 10 mL, 10 mL, Intravenous, Q12H, Milton Cummings MD, 10 mL at 01/20/20 0835  •  sodium chloride 0.9 % infusion 40 mL, 40 mL, Intravenous, PRN, Milton Cummings MD  •  timolol (TIMOPTIC) 0.5 % ophthalmic solution 1 drop, 1 drop, Right Eye, Q12H, Mery Juarez DO, 1 drop at 01/20/20 0834      Assessment/Plan     1.  Acute Diverticulitis: Continues on Levaquin and Flagyl IV.  I will switch this to PO if she continues to tolerate oral intake.    2.  SBO: This has resolved.  Surgery following.    3.  HTN: Not at goal on exam, and  trend is not at goal.  Since she has started some oral intake, will resume part of her home regimen, and continue prn Hydralazine.    4.  Hypothyroidism: Resume PO replacement.    Plan for disposition: Granddaughter asks for placement for rehab, she may not qualify.  Will ask PT to see and eval.    Sonny Lewis MD  01/20/20  9:29 AM    Addendum: Patient continues to not have diarrhea (also noted in H&P).  Based on this, as well as CT findings, no evidence of C-diff infection.  Patient may come out of isolation.

## 2020-01-21 VITALS
HEART RATE: 58 BPM | DIASTOLIC BLOOD PRESSURE: 61 MMHG | HEIGHT: 55 IN | OXYGEN SATURATION: 96 % | BODY MASS INDEX: 32.49 KG/M2 | SYSTOLIC BLOOD PRESSURE: 148 MMHG | WEIGHT: 140.4 LBS | RESPIRATION RATE: 18 BRPM | TEMPERATURE: 98.2 F

## 2020-01-21 LAB
ANION GAP SERPL CALCULATED.3IONS-SCNC: 12.2 MMOL/L (ref 5–15)
BUN BLD-MCNC: 10 MG/DL (ref 8–23)
BUN/CREAT SERPL: 12.8 (ref 7–25)
CALCIUM SPEC-SCNC: 7.8 MG/DL (ref 8.6–10.5)
CHLORIDE SERPL-SCNC: 104 MMOL/L (ref 98–107)
CO2 SERPL-SCNC: 23.8 MMOL/L (ref 22–29)
CREAT BLD-MCNC: 0.78 MG/DL (ref 0.57–1)
GFR SERPL CREATININE-BSD FRML MDRD: 70 ML/MIN/1.73
GLUCOSE BLD-MCNC: 110 MG/DL (ref 65–99)
POTASSIUM BLD-SCNC: 3.3 MMOL/L (ref 3.5–5.2)
SODIUM BLD-SCNC: 140 MMOL/L (ref 136–145)

## 2020-01-21 PROCEDURE — 99231 SBSQ HOSP IP/OBS SF/LOW 25: CPT | Performed by: SURGERY

## 2020-01-21 PROCEDURE — 97161 PT EVAL LOW COMPLEX 20 MIN: CPT

## 2020-01-21 PROCEDURE — 93005 ELECTROCARDIOGRAM TRACING: CPT | Performed by: HOSPITALIST

## 2020-01-21 PROCEDURE — 80048 BASIC METABOLIC PNL TOTAL CA: CPT | Performed by: HOSPITALIST

## 2020-01-21 PROCEDURE — 93010 ELECTROCARDIOGRAM REPORT: CPT | Performed by: INTERNAL MEDICINE

## 2020-01-21 PROCEDURE — 99239 HOSP IP/OBS DSCHRG MGMT >30: CPT | Performed by: HOSPITALIST

## 2020-01-21 RX ORDER — SACCHAROMYCES BOULARDII 250 MG
250 CAPSULE ORAL 2 TIMES DAILY
Qty: 14 CAPSULE | Refills: 0 | Status: SHIPPED | OUTPATIENT
Start: 2020-01-21 | End: 2020-01-28

## 2020-01-21 RX ORDER — MOXIFLOXACIN HYDROCHLORIDE 400 MG/1
400 TABLET ORAL DAILY
Qty: 5 TABLET | Refills: 0 | Status: SHIPPED | OUTPATIENT
Start: 2020-01-21 | End: 2020-03-07 | Stop reason: HOSPADM

## 2020-01-21 RX ADMIN — POTASSIUM CHLORIDE 40 MEQ: 1.5 POWDER, FOR SOLUTION ORAL at 07:13

## 2020-01-21 RX ADMIN — DORZOLAMIDE HCL 1 DROP: 20 SOLUTION/ DROPS OPHTHALMIC at 09:15

## 2020-01-21 RX ADMIN — POTASSIUM CHLORIDE 40 MEQ: 1.5 POWDER, FOR SOLUTION ORAL at 12:11

## 2020-01-21 RX ADMIN — FAMOTIDINE 20 MG: 10 INJECTION INTRAVENOUS at 09:14

## 2020-01-21 RX ADMIN — DILTIAZEM HYDROCHLORIDE 30 MG: 30 TABLET, FILM COATED ORAL at 09:14

## 2020-01-21 RX ADMIN — TIMOLOL MALEATE 1 DROP: 5 SOLUTION OPHTHALMIC at 09:14

## 2020-01-21 RX ADMIN — LORAZEPAM 1 MG: 1 TABLET ORAL at 15:18

## 2020-01-21 RX ADMIN — METRONIDAZOLE 500 MG: 500 INJECTION, SOLUTION INTRAVENOUS at 05:04

## 2020-01-21 RX ADMIN — BRIMONIDINE TARTRATE 1 DROP: 2 SOLUTION OPHTHALMIC at 09:16

## 2020-01-21 RX ADMIN — LORAZEPAM 1 MG: 1 TABLET ORAL at 09:14

## 2020-01-21 RX ADMIN — SODIUM CHLORIDE, PRESERVATIVE FREE 10 ML: 5 INJECTION INTRAVENOUS at 09:11

## 2020-01-21 RX ADMIN — LEVOTHYROXINE SODIUM 50 MCG: 50 TABLET ORAL at 09:19

## 2020-01-21 RX ADMIN — METRONIDAZOLE 500 MG: 500 INJECTION, SOLUTION INTRAVENOUS at 09:11

## 2020-01-21 NOTE — DISCHARGE SUMMARY
"Paulo Hampton  5/27/1930  [id number]    Hospitalists Discharge Summary    Date of Admission: 1/17/2020  Date of Discharge:  1/21/2020    Primary Discharge Diagnoses:    1.  Acute Diverticulitis  2.  Small Bowel Obstruction     Secondary Discharge Diagnoses:    1.  Hypertension  2.  Hypothyroidism    History of Present Illness (taken from H&P):    89-year-old white female very pleasant who was in her usual state of health until this morning when she woke up nauseated.  Patient had crampy periumbilical and upper abdominal pain followed by one bout of emesis and nonbloody diarrhea.  Patient took Imodium and Mirna-Natalia and felt better but then started having abdominal discomfort again.  Patient states her pain persisted and was intermittent throughout the day and therefore she decided come to emergency room.  She had no further diarrhea but she has felt bloated and has noticed increasing abdominal distention since this morning.     Patient has had C. difficile in the past was concerned that she may have it again as she had completed a course of Z-Osorio about 2 weeks ago.  Denies any fever chills    Hospital Course:    Ms. Hampton was admitted to the Med/Surg unit and continues on IV Levaquin/Flagyl and NGT.  Curiously, she reports \"diarrhea\" as an allergy to Levaquin.  However, she had no diarrhea during her course which also led to the conclusion that she didn't have C-difficle colitis.  She was seen in consultation by General Surgery, but a conservative care approach was taken.  The NGT was removed, and she was started on a clear liquid diet.  This was advanced w/o issue.  At discharge, I changed her to Moxifloxacin for ease of dosing and metabolized through the liver and not kidney.  Family asked about a dose of Diflucan due to her risk of developing yeast infections.  I explained that Diflucan can interact w/ Avelox, and it would be more prudent to wait until she finishes therapy.  Probiotic was also " prescribed.    PCP  Patient Care Team:  Laci Freedman MD as PCP - General (Family Medicine)    Consults:   Consults     Date and Time Order Name Status Description    1/17/2020 2004 Inpatient General Surgery Consult Completed           Operations and Procedures Performed:       Ct Abdomen Pelvis With Contrast    Result Date: 1/17/2020  Narrative: CT Abdomen Pelvis W 1/17/2020 INDICATION: Epigastric abdominal pain, periumbilical abdominal pain and heartburn beginning today. TECHNIQUE: CT of the abdomen and pelvis with IV contrast. Coronal and sagittal reconstructions were obtained.  Radiation dose reduction techniques included automated exposure control or exposure modulation based on body size. Count of known CT and cardiac nuc med studies performed in previous 12 months: 3. COMPARISON: 12/13/2019 FINDINGS: Abdomen: The liver is normal in appearance. Calcified granulomas are seen in the spleen. The pancreas, adrenal glands and right kidney are normal. Left renal cyst is noted. The gallbladder is surgically absent. Pelvis: There is moderate dilatation of the proximal and mid small bowel with air-fluid levels identified. There is a transition zone in the mid abdomen. The remaining small bowel and the colon are relatively decompressed. Findings suggest mechanical small bowel obstruction. No cause for the obstruction is seen on this examination. Consider adhesions. Colonic diverticulosis with inflammatory stranding in the fat adjacent to the sigmoid colon characteristic of diverticulitis. No abscess is seen. No significant adenopathy. No free fluid in the abdomen or pelvis. Lung bases are unremarkable.     Impression: 1. Sigmoid diverticulitis. No evidence of abscess. 2. Findings suggest mechanical small bowel obstruction in the mid abdomen as detailed above. Consider adhesions. 3. Surgical absence of the gallbladder. 4. Left renal cyst. Signer Name: Kris Quintana MD  Signed: 1/17/2020 5:43 PM  Workstation Name:  [facility]  [facility]    Xr Abdomen Kub    Result Date: 1/19/2020  Narrative: CR Abdomen 1 Vw INDICATION: Follow-up small bowel obstruction COMPARISON: 1/18/2020 FINDINGS: AP radiographs of the abdomen. The renal shadows are symmetric. No renal calculi. No bladder calculi. The bowel gas pattern is nonobstructive. No acute osseous abnormalities. A nasogastric tube tip is in the antrum of stomach     Impression: Bowel gas pattern is normal. The nasogastric tube appears in good position Signer Name: Ricky Ingram MD  Signed: 1/19/2020 8:31 AM  Workstation Name: [facility]  [facility]    Xr Abdomen Kub    Result Date: 1/18/2020  Narrative: CR Abdomen 1 Vw INDICATION: Follow-up small bowel obstruction COMPARISON: CT abdomen pelvis 1/17/2020 FINDINGS: AP radiographs of the abdomen. The renal shadows are symmetric. No renal calculi. No bladder calculi. There is gas visible within couple of loops of small intestine that are slightly distended. It appears improved as compared with the scalp from yesterday's CT scan. The colon is not distended No acute osseous abnormalities. Surgical clips are present in the gallbladder fossa. A nasogastric tube has its tip in the antrum of stomach     Impression: Bowel gas pattern appears improved as compared with  film from CT scan yesterday. Nasogastric tube is in good position Signer Name: Ricky Ingram MD  Signed: 1/18/2020 8:56 AM  Workstation Name: [facility]  [facility]      Allergies:  is allergic to biaxin [clarithromycin]; ceftin [cefuroxime]; celexa [citalopram]; chlorpheniramine; ciprofloxacin; crestor [rosuvastatin]; doxycycline; effexor [venlafaxine]; fluoxetine; hydrocodone bitartrate er; imipramine hcl; levaquin [levofloxacin]; lexapro [escitalopram oxalate]; macrobid [nitrofurantoin monohyd macro]; methscopolamine; phenylpropanolamine; prednisone; promethazine; simvastatin; verapamil; welchol  [colesevelam hcl]; zetia [ezetimibe]; zoloft [sertraline hcl]; and penicillins.    Quan  reviewed    Discharge Medications:     Discharge Medications      New Medications      Instructions Start Date   moxifloxacin 400 MG tablet  Commonly known as:  AVELOX   400 mg, Oral, Daily      saccharomyces boulardii 250 MG capsule  Commonly known as:  FLORASTOR   250 mg, Oral, 2 Times Daily         Continue These Medications      Instructions Start Date   aspirin 81 MG chewable tablet   81 mg, Oral, Daily      benzonatate 100 MG capsule  Commonly known as:  TESSALON   100 mg, Oral, 3 Times Daily PRN      BRINZOLAMIDE - BRIMONIDONE 1-0.2% OPTHALMIC SUSPENSION   0.2 %, Right Eye, 2 Times Daily      CALCIUM 500 + D PO   1 tablet, Oral, 2 Times Daily      CENTRUM SILVER PO   1 tablet, Oral, Daily      clotrimazole-betamethasone 1-0.05 % cream  Commonly known as:  LOTRISONE   1 application, Topical, 2 Times Daily PRN      dicyclomine 10 MG capsule  Commonly known as:  BENTYL   10 mg, Oral, 4 Times Daily Before Meals & Nightly PRN      dilTIAZem 30 MG tablet  Commonly known as:  CARDIZEM   30 mg, Oral, 2 Times Daily      dorzolamide 2 % ophthalmic solution  Commonly known as:  TRUSOPT   1 drop, Right Eye, 2 Times Daily      fluticasone 50 MCG/ACT nasal spray  Commonly known as:  FLONASE   2 sprays, Nasal, Daily      guaiFENesin 600 MG 12 hr tablet  Commonly known as:  MUCINEX   600 mg, Oral, Every 12 Hours PRN      hydrocortisone-pramoxine 1-1 % rectal foam  Commonly known as:  PROCTOFOAM-HS   1 applicator, Rectal, 2 Times Daily      latanoprost 0.005 % ophthalmic solution  Commonly known as:  XALATAN   1 drop, Right Eye, Nightly      levocetirizine 5 MG tablet  Commonly known as:  XYZAL   5 mg, Oral, Every Evening      lisinopril 20 MG tablet  Commonly known as:  PRINIVIL,ZESTRIL   20 mg, Oral, Daily, Take one half tablet daily for 1 week then advanced to one full tablet daily      LORazepam 1 MG tablet  Commonly known as:   ATIVAN   1 mg, Oral, 3 Times Daily, Takes 1 in AM; 2 at HS.  Per daughter in law pt PMD told pt she can take an extra at 5pm      meclizine 12.5 MG tablet  Commonly known as:  ANTIVERT   12.5 mg, Oral, 3 Times Daily PRN      simethicone 125 MG chewable tablet  Commonly known as:  MYLICON   125 mg, Oral, Every 6 Hours PRN      SYNTHROID 50 MCG tablet  Generic drug:  levothyroxine   50 mcg, Oral, Daily      timolol 0.5 % ophthalmic solution  Commonly known as:  TIMOPTIC   1 drop, Right Eye, 2 Times Daily      ZANTAC 150 MG tablet  Generic drug:  raNITIdine   150 mg, Oral, 2 Times Daily         Stop These Medications    KLOR-CON 10 PO     pantoprazole 40 MG EC tablet  Commonly known as:  PROTONIX            Last Lab Results:   Lab Results (most recent)     Procedure Component Value Units Date/Time    Basic Metabolic Panel [[id number]]  (Abnormal) Collected:  01/21/20 0435    Specimen:  Blood Updated:  01/21/20 0542     Glucose 110 mg/dL      BUN 10 mg/dL      Creatinine 0.78 mg/dL      Sodium 140 mmol/L      Potassium 3.3 mmol/L      Chloride 104 mmol/L      CO2 23.8 mmol/L      Calcium 7.8 mg/dL      eGFR Non African Amer 70 mL/min/1.73      BUN/Creatinine Ratio 12.8     Anion Gap 12.2 mmol/L     Narrative:       GFR Normal >60  Chronic Kidney Disease <60  Kidney Failure <15      Basic Metabolic Panel [[id number]]  (Abnormal) Collected:  01/20/20 0405    Specimen:  Blood Updated:  01/20/20 0504     Glucose 106 mg/dL      BUN 15 mg/dL      Creatinine 0.76 mg/dL      Sodium 138 mmol/L      Potassium 3.6 mmol/L      Chloride 104 mmol/L      CO2 22.5 mmol/L      Calcium 7.8 mg/dL      eGFR Non African Amer 72 mL/min/1.73      BUN/Creatinine Ratio 19.7     Anion Gap 11.5 mmol/L     Narrative:       GFR Normal >60  Chronic Kidney Disease <60  Kidney Failure <15      Urinalysis With Microscopic If Indicated (No Culture) - Urine, Clean Catch [[id number]]  (Abnormal) Collected:  01/20/20 0205    Specimen:  Urine, Clean Catch  Updated:  01/20/20 0214     Color, UA Yellow     Appearance, UA Clear     pH, UA 7.5     Specific Gravity, UA 1.020     Glucose, UA Negative     Ketones, UA 40 mg/dL (2+)     Bilirubin, UA Negative     Blood, UA Negative     Protein, UA Negative     Leuk Esterase, UA Negative     Nitrite, UA Negative     Urobilinogen, UA 0.2 E.U./dL    Narrative:       Urine microscopic not indicated.    Potassium [[id number]]  (Normal) Collected:  01/19/20 1946    Specimen:  Blood Updated:  01/19/20 2013     Potassium 3.8 mmol/L     CBC & Differential [[id number]] Collected:  01/19/20 0417    Specimen:  Blood Updated:  01/19/20 0503    Narrative:       The following orders were created for panel order CBC & Differential.  Procedure                               Abnormality         Status                     ---------                               -----------         ------                     CBC Auto Differential[[id number]]        Abnormal            Final result                 Please view results for these tests on the individual orders.    CBC Auto Differential [[id number]]  (Abnormal) Collected:  01/19/20 0417    Specimen:  Blood Updated:  01/19/20 0503     WBC 10.30 10*3/mm3      RBC 4.11 10*6/mm3      Hemoglobin 12.7 g/dL      Hematocrit 40.6 %      MCV 98.8 fL      MCH 30.9 pg      MCHC 31.3 g/dL      RDW 12.8 %      RDW-SD 46.2 fl      MPV 11.3 fL      Platelets 226 10*3/mm3      Neutrophil % 78.9 %      Lymphocyte % 15.0 %      Monocyte % 5.7 %      Eosinophil % 0.0 %      Basophil % 0.1 %      Immature Grans % 0.3 %      Neutrophils, Absolute 8.12 10*3/mm3      Lymphocytes, Absolute 1.55 10*3/mm3      Monocytes, Absolute 0.59 10*3/mm3      Eosinophils, Absolute 0.00 10*3/mm3      Basophils, Absolute 0.01 10*3/mm3      Immature Grans, Absolute 0.03 10*3/mm3      nRBC 0.0 /100 WBC     Troponin [[id number]]  (Normal) Collected:  01/18/20 2352    Specimen:  Blood Updated:  01/19/20 0023     Troponin T <0.010 ng/mL     Narrative:        Troponin T Reference Range:  <= 0.03 ng/mL-   Negative for AMI  >0.03 ng/mL-     Abnormal for myocardial necrosis.  Clinicians would have to utilize clinical acumen, EKG, Troponin and serial changes to determine if it is an Acute Myocardial Infarction or myocardial injury due to an underlying chronic condition.       Results may be falsely decreased if patient taking Biotin.      CBC & Differential [[id number]] Collected:  01/18/20 0359    Specimen:  Blood Updated:  01/18/20 0849    Narrative:       The following orders were created for panel order CBC & Differential.  Procedure                               Abnormality         Status                     ---------                               -----------         ------                     CBC Auto Differential[[id number]]        Abnormal            Final result                 Please view results for these tests on the individual orders.    CBC Auto Differential [[id number]]  (Abnormal) Collected:  01/18/20 0359    Specimen:  Blood Updated:  01/18/20 0849     WBC 10.62 10*3/mm3      RBC 4.10 10*6/mm3      Hemoglobin 12.6 g/dL      Hematocrit 40.7 %      MCV 99.3 fL      MCH 30.7 pg      MCHC 31.0 g/dL      RDW 13.0 %      RDW-SD 48.2 fl      MPV 12.2 fL      Platelets 217 10*3/mm3      Neutrophil % 72.3 %      Lymphocyte % 18.8 %      Monocyte % 8.6 %      Eosinophil % 0.1 %      Basophil % 0.1 %      Immature Grans % 0.1 %      Neutrophils, Absolute 7.68 10*3/mm3      Lymphocytes, Absolute 2.00 10*3/mm3      Monocytes, Absolute 0.91 10*3/mm3      Eosinophils, Absolute 0.01 10*3/mm3      Basophils, Absolute 0.01 10*3/mm3      Immature Grans, Absolute 0.01 10*3/mm3     Comprehensive Metabolic Panel [[id number]]  (Abnormal) Collected:  01/17/20 1627    Specimen:  Blood Updated:  01/17/20 1700     Glucose 139 mg/dL      BUN 17 mg/dL      Creatinine 0.88 mg/dL      Sodium 139 mmol/L      Potassium 4.3 mmol/L      Chloride 101 mmol/L      CO2 25.2 mmol/L      Calcium  9.6 mg/dL      Total Protein 7.4 g/dL      Albumin 3.90 g/dL      ALT (SGPT) 24 U/L      AST (SGOT) 28 U/L      Alkaline Phosphatase 75 U/L      Total Bilirubin 0.5 mg/dL      eGFR Non African Amer 61 mL/min/1.73      Globulin 3.5 gm/dL      A/G Ratio 1.1 g/dL      BUN/Creatinine Ratio 19.3     Anion Gap 12.8 mmol/L     Narrative:       GFR Normal >60  Chronic Kidney Disease <60  Kidney Failure <15      Lipase [[id number]]  (Normal) Collected:  01/17/20 1627    Specimen:  Blood Updated:  01/17/20 1700     Lipase 16 U/L     Urinalysis With Microscopic If Indicated (No Culture) - Urine, Clean Catch [[id number]]  (Normal) Collected:  01/17/20 1634    Specimen:  Urine, Clean Catch Updated:  01/17/20 1642     Color, UA Yellow     Appearance, UA Clear     pH, UA 6.5     Specific Gravity, UA 1.020     Glucose, UA Negative     Ketones, UA Negative     Bilirubin, UA Negative     Blood, UA Negative     Protein, UA Negative     Leuk Esterase, UA Negative     Nitrite, UA Negative     Urobilinogen, UA 0.2 E.U./dL    Narrative:       Urine microscopic not indicated.        Imaging Results (Most Recent)     Procedure Component Value Units Date/Time    XR Abdomen KUB [[id number]] Collected:  01/19/20 0831     Updated:  01/19/20 0833    Narrative:       CR Abdomen 1 Vw    INDICATION:   Follow-up small bowel obstruction    COMPARISON:   1/18/2020 FINDINGS:  AP radiographs of the abdomen. The renal shadows are symmetric. No renal calculi. No bladder calculi.    The bowel gas pattern is nonobstructive. No acute osseous abnormalities. A nasogastric tube tip is in the antrum of stomach      Impression:       Bowel gas pattern is normal. The nasogastric tube appears in good position    Signer Name: Ricky Ingram MD   Signed: 1/19/2020 8:31 AM   Workstation Name: [email or web address]    [facility]    XR Abdomen KUB [[id number]] Collected:  01/18/20 0856     Updated:  01/18/20 0858    Narrative:       CR Abdomen 1 Vw    INDICATION:    Follow-up small bowel obstruction    COMPARISON:   CT abdomen pelvis 1/17/2020    FINDINGS:  AP radiographs of the abdomen. The renal shadows are symmetric. No renal calculi. No bladder calculi.    There is gas visible within couple of loops of small intestine that are slightly distended. It appears improved as compared with the scalp from yesterday's CT scan. The colon is not distended No acute osseous abnormalities. Surgical clips are present in  the gallbladder fossa. A nasogastric tube has its tip in the antrum of stomach      Impression:       Bowel gas pattern appears improved as compared with  film from CT scan yesterday.    Nasogastric tube is in good position    Signer Name: Ricky Ingram MD   Signed: 1/18/2020 8:56 AM   Workstation Name: [employer]    [facility] [place]    CT Abdomen Pelvis With Contrast [[id number]] Collected:  01/17/20 1743     Updated:  01/17/20 1757    Narrative:       CT Abdomen Pelvis W 1/17/2020    INDICATION:   Epigastric abdominal pain, periumbilical abdominal pain and heartburn beginning today.    TECHNIQUE:   CT of the abdomen and pelvis with IV contrast. Coronal and sagittal reconstructions were obtained.  Radiation dose reduction techniques included automated exposure control or exposure modulation based on body size. Count of known CT and cardiac nuc med  studies performed in previous 12 months: 3.     COMPARISON:   12/13/2019    FINDINGS:  Abdomen: The liver is normal in appearance. Calcified granulomas are seen in the spleen. The pancreas, adrenal glands and right kidney are normal. Left renal cyst is noted. The gallbladder is surgically absent.    Pelvis: There is moderate dilatation of the proximal and mid small bowel with air-fluid levels identified. There is a transition zone in the mid abdomen. The remaining small bowel and the colon are relatively decompressed. Findings suggest mechanical  small bowel obstruction. No cause for the obstruction is  seen on this examination. Consider adhesions. Colonic diverticulosis with inflammatory stranding in the fat adjacent to the sigmoid colon characteristic of diverticulitis. No abscess is seen. No  significant adenopathy. No free fluid in the abdomen or pelvis. Lung bases are unremarkable.      Impression:         1. Sigmoid diverticulitis. No evidence of abscess.  2. Findings suggest mechanical small bowel obstruction in the mid abdomen as detailed above. Consider adhesions.  3. Surgical absence of the gallbladder.  4. Left renal cyst.          Signer Name: Kris Quintana MD   Signed: 1/17/2020 5:43 PM   Workstation Name: [id number]    [facility] [place]          PROCEDURES      Condition on Discharge:  Stable    Physical Exam at Discharge  Vital Signs  Temp:  [97.4 °F (36.3 °C)-98.2 °F (36.8 °C)] 98.2 °F (36.8 °C)  Heart Rate:  [58-70] 58  Resp:  [16-18] 18  BP: (142-149)/(47-76) 148/61    Physical Exam:  Physical Exam   Constitutional: Elderly female in NAD   Cardiovascular: Regular rate, regular rhythm, S1 normal and S2 normal.  Exam reveals no gallop and no friction rub.  No murmur heard.  Pulmonary/Chest: Lungs are diminished to auscultation bilaterally. No respiratory distress.   Abdominal: Soft. Bowel sounds are normal. No distension and no mass. There is no tenderness.   Neurological: Cranial nerves II-XII are grossly intact with no focal deficits.    Discharge Disposition  Home    Visiting Nurse:    No     Home PT/OT:  No     Home Safety Evaluation:  No     DME  None    Discharge Diet:      Dietary Orders (From admission, onward)     Start     Ordered    01/21/20 1136  Diet Regular; Cardiac, Low Fiber / Low Residue  Diet Effective Now     Question Answer Comment   Diet Texture / Consistency Regular    Common Modifiers Cardiac    Common Modifiers Low Fiber / Low Residue        01/21/20 1136                Activity at Discharge:  As tolerated      Follow-up Appointments  No future  appointments.  Additional Instructions for the Follow-ups that You Need to Schedule     Discharge Follow-up with PCP   As directed       Currently Documented PCP:    Laci Freedman MD    PCP Phone Number:    [phone]     Follow Up Details:  1 week               Test Results Pending at Discharge  None     Sonny Lewis MD  01/21/20  3:17 PM    Time: Greater than 30 minutes was spent on this discharge due to researching viability of Avelox as therpay w/ clinical pharmacist as well as providing Good Rx coupon to mitigate any cost issues w/ this therapy.  All family questions answered to their satisfaction.

## 2020-01-21 NOTE — PLAN OF CARE
Problem: Patient Care Overview  Goal: Plan of Care Review  Flowsheets  Taken 1/21/2020 1142  Consent Given to Review Plan with: PT Evaluation Complete: Patient performs sit to/from stand transfers with supervision and gait x 356 feet with supervision with supervision and use of FWW. Patient manages all directional changes, external obstacles and small spaces without loss of balance or need for cues. Patient anticipates discharge home with son where she will have 24/7 care. Recommend continued use of FWW for mobility. No inpatient skilled PT needs at this time.  Taken 1/21/2020 2595  Plan of Care Reviewed With: patient;family

## 2020-01-21 NOTE — THERAPY DISCHARGE NOTE
Acute Care - Physical Therapy Initial Eval/Discharge   Dania Boland     Patient Name: Paulo Hampton  : 1930  MRN: [id number]  Today's Date: 2020   Onset of Illness/Injury or Date of Surgery: 20  Date of Referral to PT: 20  Referring Physician: Dr. Lewis       Admit Date: 2020    Visit Dx:    ICD-10-CM ICD-9-CM   1. Small bowel obstruction (CMS/HCC) K56.609 560.9   2. Diverticulitis K57.92 562.11     Patient Active Problem List   Diagnosis   • Small bowel obstruction (CMS/HCC)   • Diverticulitis   • HTN (hypertension), benign   • GERD without esophagitis   • Hypothyroidism   • Hyperlipidemia   • Anxiety disorder     Past Medical History:   Diagnosis Date   • Abdominal pain    • Anxiety    • Arthritis    • Atrial fibrillation (CMS/HCC)    • C. difficile colitis    • Diarrhea    • GERD (gastroesophageal reflux disease)    • Hyperlipidemia    • Hypertension    • Hypothyroid    • Presbyesophagus    • Sinus congestion    • Thrush      Past Surgical History:   Procedure Laterality Date   • ABDOMINAL SURGERY     • APPENDECTOMY     • CHOLECYSTECTOMY     • ENDOSCOPY N/A 2017    Procedure: ESOPHAGOGASTRODUODENOSCOPY WITH DILATION;  Surgeon: Betito Montejo MD;  Location: [facility] OR;  Service:    • HYSTERECTOMY     • KNEE ARTHROSCOPY Right    • THYROIDECTOMY            PT ASSESSMENT (last 12 hours)      Physical Therapy Evaluation     Row Name 20 0958          PT Evaluation Time/Intention    Subjective Information  no complaints  -BP     Document Type  discharge evaluation/summary  -BP     Mode of Treatment  physical therapy  -BP     Patient Effort  good  -BP     Symptoms Noted During/After Treatment  none  -BP     Row Name 20 0958          General Information    Patient Profile Reviewed?  yes  -BP     Onset of Illness/Injury or Date of Surgery  20  -BP     Referring Physician  Dr. Lewis   -BP     Patient Observations  alert;cooperative;agree to therapy  -BP      Patient/Family Observations  Patient reclined in bedside chair. Agreeable PT evaluation. Family present. IV line in use   -BP     Prior Level of Function  independent:;gait;bed mobility;ADL's;transfer;all household mobility  -BP     Equipment Currently Used at Home  cane, straight;rollator  -BP     Pertinent History of Current Functional Problem  Patient presents due to nausea and abdominal pain. Patient diagnosed with SBA, now resolved. Patient reports at baseline she uses a straight cane or rollator intermittently  -BP     Existing Precautions/Restrictions  fall  -BP     Risks Reviewed  patient:;LOB;increased discomfort  -BP     Benefits Reviewed  patient:;improve function;increase independence;increase strength  -BP     Barriers to Rehab  none identified  -BP     Row Name 01/21/20 0958          Relationship/Environment    Lives With  alone  -BP     Family Caregiver if Needed  child(vitor), adult  -BP     Family Caregiver Names  Patient plans to go to her sons home at discharge where she will have 24/7 care   -BP     Row Name 01/21/20 0958          Resource/Environmental Concerns    Current Living Arrangements  home/apartment/condo  -BP     Row Name 01/21/20 0958          Living Environment    Living Arrangements  house  -BP     Home Accessibility  -- no stairs to enter   -BP     Row Name 01/21/20 0958          Cognitive Assessment/Interventions    Additional Documentation  Cognitive Assessment/Intervention (Group)  -BP     Row Name 01/21/20 0958          Cognitive Assessment/Intervention- PT/OT    Orientation Status (Cognition)  oriented to;person;place;situation;time  -BP     Follows Commands (Cognition)  WFL  -BP     Personal Safety Interventions  gait belt;nonskid shoes/slippers when out of bed  -BP     Row Name 01/21/20 0958          Bed Mobility Assessment/Treatment    Comment (Bed Mobility)  deferred, patient up in chair.   -BP     Row Name 01/21/20 0958          Transfer Assessment/Treatment    Transfer  Assessment/Treatment  sit-stand transfer;stand-sit transfer;toilet transfer  -BP     Comment (Transfers)  Patient demonstrates proper hand placement  -BP     Sit-Stand [place] (Transfers)  supervision  -BP     Stand-Sit [place] (Transfers)  supervision  -BP     [place] Level (Toilet Transfer)  supervision  -BP     Assistive Device (Toilet Transfer)  walker, front-wheeled;raised toilet seat  -BP     Row Name 01/21/20 0958          Sit-Stand Transfer    Assistive Device (Sit-Stand Transfers)  walker, front-wheeled  -BP     Row Name 01/21/20 0958          Stand-Sit Transfer    Assistive Device (Stand-Sit Transfers)  walker, front-wheeled  -BP     Row Name 01/21/20 0958          Toilet Transfer    Type (Toilet Transfer)  stand-sit;sit-stand  -BP     Row Name 01/21/20 0958          Gait/Stairs Assessment/Training    [place] Level (Gait)  supervision  -BP     Assistive Device (Gait)  walker, front-wheeled  -BP     Distance in Feet (Gait)  356  -BP     Pattern (Gait)  swing-through  -BP     Deviations/Abnormal Patterns (Gait)  gait speed decreased  -BP     Bilateral Gait Deviations  forward flexed posture  -BP     Comment (Gait/Stairs)  Patient demonstrates  no loss of balance. Navigates directional changes, small spaces and external obstacles without assist.   -BP     Row Name 01/21/20 0958          General ROM    GENERAL ROM COMMENTS  B LE AROM WFL. B UE AROM WFL.   -BP     Row Name 01/21/20 0958          MMT (Manual Muscle Testing)    General MMT Comments  Gross MMT of all extremities 4+/5   -BP     Row Name 01/21/20 0958          Sensory Assessment/Intervention    Sensory General Assessment  -- Patient denies numbness and tingling B LE's   -BP     Row Name 01/21/20 0958          Pain Assessment    Additional Documentation  Pain Scale: Numbers Pre/Post-Treatment (Group)  -BP     Row Name 01/21/20 0958          Pain Scale: Numbers Pre/Post-Treatment    Pain Scale: Numbers, Pretreatment  0/10 - no pain   -BP     Pain Scale: Numbers, Post-Treatment  0/10 - no pain  -BP     Row Name 01/21/20 0958          Plan of Care Review    Plan of Care Reviewed With  patient;family  -BP     Outcome Summary  PT Evaluation Complete: Patient performs sit to/from stand transfers with supervision and gait x 356 feet with supervision with supervision and use of FWW. Patient manages all directional changes, external obstacles and small spaces without loss of balance or need for cues. Patient anticipates discharge home with son where she will have 24/7 care. Recommend continued use of FWW for mobility. No inpatient skilled PT needs at this time.   -BP     Row Name 01/21/20 0958          Physical Therapy Clinical Impression    Date of Referral to PT  01/20/20  -BP     Criteria for Skilled Interventions Met (PT Clinical Impression)  no problems identified which require skilled intervention  -BP     Care Plan Review (PT)  evaluation/treatment results reviewed  -BP     Row Name 01/21/20 0958          Positioning and Restraints    Pre-Treatment Position  sitting in chair/recliner  -BP     Post Treatment Position  chair  -BP     In Chair  notified nsg;reclined;call light within reach;encouraged to call for assist;with family/caregiver  -BP       User Key  (r) = Recorded By, (t) = Taken By, (c) = Cosigned By    Initials Name Provider Type    Alida Brothers, PT Physical Therapist              PT Recommendation and Plan  Anticipated Discharge Disposition (PT): home with home health  Therapy Frequency (PT Clinical Impression): evaluation only  Outcome Summary/Treatment Plan (PT)  Anticipated Discharge Disposition (PT): home with home health  Plan of Care Reviewed With: patient, family  Outcome Summary: PT Evaluation Complete: Patient performs sit to/from stand transfers with supervision and gait x 356 feet with supervision with supervision and use of FWW. Patient manages all directional changes, external obstacles and small spaces without  loss of balance or need for cues. Patient anticipates discharge home with son where she will have 24/7 care. Recommend continued use of FWW for mobility. No inpatient skilled PT needs at this time.     Outcome Measures     Row Name 01/21/20 0958             How much help from another person do you currently need...    Turning from your back to your side while in flat bed without using bedrails?  3  -BP      Moving from lying on back to sitting on the side of a flat bed without bedrails?  3  -BP      Moving to and from a bed to a chair (including a wheelchair)?  3  -BP      Standing up from a chair using your arms (e.g., wheelchair, bedside chair)?  3  -BP      Climbing 3-5 steps with a railing?  3  -BP      To walk in hospital room?  3  -BP      AM-PAC 6 Clicks Score (PT)  18  -BP         Functional Assessment    Outcome Measure Options  AM-PAC 6 Clicks Basic Mobility (PT)  -BP        User Key  (r) = Recorded By, (t) = Taken By, (c) = Cosigned By    Initials Name Provider Type    Alida Brothers PT Physical Therapist           Time Calculation:   PT Charges     Row Name 01/21/20 1143             Time Calculation    Start Time  0958  -BP        User Key  (r) = Recorded By, (t) = Taken By, (c) = Cosigned By    Initials Name Provider Type    Alida Brothers PT Physical Therapist        Therapy Charges for Today     Code Description Service Date Service Provider Modifiers Qty    56894377388 HC PT EVAL LOW COMPLEXITY 2 1/21/2020 Alida Bryan PT GP 1          PT G-Codes  Outcome Measure Options: AM-PAC 6 Clicks Basic Mobility (PT)  AM-PAC 6 Clicks Score (PT): 18    PT Discharge Summary  Anticipated Discharge Disposition (PT): home with home health  Reason for Discharge: Maximum functional potential achieved    Alida Bryan, MORGAN  1/21/2020

## 2020-01-21 NOTE — PROGRESS NOTES
Surgery Progress Note   Chief Complaint:  Diverticulitis, sbo    Subjective     Interval History:     Paulo has passed flatus and has tolerated her clears.  She has had no nausea or vomiting.  She still has not moved her bowels.      Objective     Vital Signs  Temp:  [97.4 °F (36.3 °C)-97.9 °F (36.6 °C)] 97.4 °F (36.3 °C)  Heart Rate:  [70] 70  Resp:  [16-18] 18  BP: (142-153)/(47-76) 142/76  Body mass index is 34.51 kg/m².    Intake/Output Summary (Last 24 hours) at 1/21/2020 0733  Last data filed at 1/21/2020 0506  Gross per 24 hour   Intake 940 ml   Output 1750 ml   Net -810 ml     No intake/output data recorded.       Physical Exam:   General: patient awake, alert and cooperative   Abdomen: soft, nontender, normal bowel sounds   Extremities: no rash or edema   Neurologic: Normal mood and behavior     Results Review:     I reviewed the patient's new clinical results.      WBC No results found for: WBCS   HGB Hemoglobin   Date Value Ref Range Status   01/19/2020 12.7 12.0 - 15.9 g/dL Final      HCT Hematocrit   Date Value Ref Range Status   01/19/2020 40.6 34.0 - 46.6 % Final      Platlets No results found for: LABPLAT     PT/INR:  No results found for: PROTIME/No results found for: INR    Sodium Sodium   Date Value Ref Range Status   01/21/2020 140 136 - 145 mmol/L Final   01/20/2020 138 136 - 145 mmol/L Final   01/19/2020 138 136 - 145 mmol/L Final      Potassium Potassium   Date Value Ref Range Status   01/21/2020 3.3 (L) 3.5 - 5.2 mmol/L Final   01/20/2020 3.6 3.5 - 5.2 mmol/L Final   01/19/2020 3.8 3.5 - 5.2 mmol/L Final   01/19/2020 3.2 (L) 3.5 - 5.2 mmol/L Final      Chloride Chloride   Date Value Ref Range Status   01/21/2020 104 98 - 107 mmol/L Final   01/20/2020 104 98 - 107 mmol/L Final   01/19/2020 99 98 - 107 mmol/L Final      Bicarbonate No results found for: PLASMABICARB   BUN BUN   Date Value Ref Range Status   01/21/2020 10 8 - 23 mg/dL Final   01/20/2020 15 8 - 23 mg/dL Final    01/19/2020 11 8 - 23 mg/dL Final      Creatinine Creatinine   Date Value Ref Range Status   01/21/2020 0.78 0.57 - 1.00 mg/dL Final   01/20/2020 0.76 0.57 - 1.00 mg/dL Final   01/19/2020 0.89 0.57 - 1.00 mg/dL Final      Calcium Calcium   Date Value Ref Range Status   01/21/2020 7.8 (L) 8.6 - 10.5 mg/dL Final   01/20/2020 7.8 (L) 8.6 - 10.5 mg/dL Final   01/19/2020 8.2 (L) 8.6 - 10.5 mg/dL Final      Magnesium  AST  ALT  Bilirubin, Total  AlkPhos  Albumin    Amylase  Lipase    Radiology: No results found for: MG  No components found for: AST.*  No components found for: ALT.*  No components found for: BILIRUBIN, TOTAL.*    No components found for: ALKPHOS.*  No components found for: ALBUMIN.*      No components found for: AMYLASE.*  No components found for: LIPASE.*            Imaging Results (Most Recent)     Procedure Component Value Units Date/Time    XR Abdomen KUB [[id number]] Collected:  01/19/20 0831     Updated:  01/19/20 0833    Narrative:       CR Abdomen 1 Vw    INDICATION:   Follow-up small bowel obstruction    COMPARISON:   1/18/2020 FINDINGS:  AP radiographs of the abdomen. The renal shadows are symmetric. No renal calculi. No bladder calculi.    The bowel gas pattern is nonobstructive. No acute osseous abnormalities. A nasogastric tube tip is in the antrum of stomach      Impression:       Bowel gas pattern is normal. The nasogastric tube appears in good position    Signer Name: Ricky Ingram MD   Signed: 1/19/2020 8:31 AM   Workstation Name: [email or web address]    [facility]    XR Abdomen KUB [[id number]] Collected:  01/18/20 0856     Updated:  01/18/20 0858    Narrative:       CR Abdomen 1 Vw    INDICATION:   Follow-up small bowel obstruction    COMPARISON:   CT abdomen pelvis 1/17/2020    FINDINGS:  AP radiographs of the abdomen. The renal shadows are symmetric. No renal calculi. No bladder calculi.    There is gas visible within couple of loops of small intestine that are slightly  distended. It appears improved as compared with the scalp from yesterday's CT scan. The colon is not distended No acute osseous abnormalities. Surgical clips are present in  the gallbladder fossa. A nasogastric tube has its tip in the antrum of stomach      Impression:       Bowel gas pattern appears improved as compared with  film from CT scan yesterday.    Nasogastric tube is in good position    Signer Name: Ricky Ingram MD   Signed: 1/18/2020 8:56 AM   Workstation Name: [id number]    [facility]    CT Abdomen Pelvis With Contrast [[id number]] Collected:  01/17/20 1743     Updated:  01/17/20 1757    Narrative:       CT Abdomen Pelvis W 1/17/2020    INDICATION:   Epigastric abdominal pain, periumbilical abdominal pain and heartburn beginning today.    TECHNIQUE:   CT of the abdomen and pelvis with IV contrast. Coronal and sagittal reconstructions were obtained.  Radiation dose reduction techniques included automated exposure control or exposure modulation based on body size. Count of known CT and cardiac nuc med  studies performed in previous 12 months: 3.     COMPARISON:   12/13/2019    FINDINGS:  Abdomen: The liver is normal in appearance. Calcified granulomas are seen in the spleen. The pancreas, adrenal glands and right kidney are normal. Left renal cyst is noted. The gallbladder is surgically absent.    Pelvis: There is moderate dilatation of the proximal and mid small bowel with air-fluid levels identified. There is a transition zone in the mid abdomen. The remaining small bowel and the colon are relatively decompressed. Findings suggest mechanical  small bowel obstruction. No cause for the obstruction is seen on this examination. Consider adhesions. Colonic diverticulosis with inflammatory stranding in the fat adjacent to the sigmoid colon characteristic of diverticulitis. No abscess is seen. No  significant adenopathy. No free fluid in the abdomen or pelvis. Lung bases are  unremarkable.      Impression:         1. Sigmoid diverticulitis. No evidence of abscess.  2. Findings suggest mechanical small bowel obstruction in the mid abdomen as detailed above. Consider adhesions.  3. Surgical absence of the gallbladder.  4. Left renal cyst.          Signer Name: Kris Quintana MD   Signed: 1/17/2020 5:43 PM   Workstation Name: [id number]    [facility]                    Assessment/Plan     Patient Active Problem List   Diagnosis Code   • Small bowel obstruction (CMS/HCC) K56.609   • Diverticulitis K57.92   • HTN (hypertension), benign I10   • GERD without esophagitis K21.9   • Hypothyroidism E03.9   • Hyperlipidemia E78.5   • Anxiety disorder F41.9       Diverticulitis   sbo--resolved  --will advance to fulls      Kathia Mendoza DO  01/21/20  7:33 AM

## 2020-01-21 NOTE — NURSING NOTE
"Continued Stay Note  PAULO Pillai     Patient Name: Paulo Hampton  MRN: [id number]  Today's Date: 1/21/2020    Admit Date: 1/17/2020    Discharge Plan     Row Name 01/21/20 1123       Plan    Plan  Home with son Eleazar    Provided post acute provider list?  Yes    Post Acute Provider List  Home Health    Post Acute Provider Quality & Resource List  Yes    Delivered To  Support Person;Patient    Support Person  Suki, granddaughter    Method of Delivery  In person    Patient/Family in Agreement with Plan  yes    Plan Comments  Spoke with patient and family today regarding the posible need for home health. Granddaughter Suki states \"we are not sure if she'll need it or not once she leaves her son's house but would like a list of home health agencies just in case\". She also declined CM calling to help arrange this at this time. Resources provided to patient and family as requested. Patient and family had no other questions or concerns regarding discharge plans at this time. CM will continue to follow for needs.        Discharge Codes    No documentation.             Anita Madden RN    "

## 2020-01-22 ENCOUNTER — READMISSION MANAGEMENT (OUTPATIENT)
Dept: CALL CENTER | Facility: HOSPITAL | Age: 85
End: 2020-01-22

## 2020-01-22 NOTE — NURSING NOTE
Case Management Discharge Note      Final Note: D/C home    Provided post acute provider list?: Yes  Post Acute Provider List: Home Health  Post Acute Provider Quality & Resource List: Yes  Delivered To: Support Person, Patient  Support Person: fay Cohn  Method of Delivery: In person    Destination      No service has been selected for the patient.      Durable Medical Equipment      No service has been selected for the patient.      Dialysis/Infusion      No service has been selected for the patient.      Home Medical Care      No service has been selected for the patient.      Therapy      No service has been selected for the patient.      Community Resources      No service has been selected for the patient.             Final Discharge Disposition Code: 01 - home or self-care

## 2020-01-22 NOTE — OUTREACH NOTE
Prep Survey      Responses   Facility patient discharged from?  [facility]   Is LACE score < 7 ?  No   Is patient eligible?  Yes   Discharge diagnosis  Diverticulitis,   SBO   Does the patient have one of the following disease processes/diagnoses(primary or secondary)?  Other   Does the patient have Home health ordered?  No   Is there a DME ordered?  No   Prep survey completed?  Yes          Ariane Olivia RN

## 2020-01-24 ENCOUNTER — READMISSION MANAGEMENT (OUTPATIENT)
Dept: CALL CENTER | Facility: HOSPITAL | Age: 85
End: 2020-01-24

## 2020-01-24 NOTE — OUTREACH NOTE
Medical Week 1 Survey      Responses   Facility patient discharged from?  [facility]   Does the patient have one of the following disease processes/diagnoses(primary or secondary)?  Other   Is there a successful TCM telephone encounter documented?  No   Week 1 attempt successful?  No   Unsuccessful attempts  Attempt 1          Kathy Pretty RN

## 2020-01-27 ENCOUNTER — READMISSION MANAGEMENT (OUTPATIENT)
Dept: CALL CENTER | Facility: HOSPITAL | Age: 85
End: 2020-01-27

## 2020-01-27 NOTE — OUTREACH NOTE
Medical Week 1 Survey      Responses   Facility patient discharged from?  [facility]   Does the patient have one of the following disease processes/diagnoses(primary or secondary)?  Other   Is there a successful TCM telephone encounter documented?  No   Week 1 attempt successful?  Yes   Call start time  1423   Call end time  1426   Discharge diagnosis  Diverticulitis,   SBO   Meds reviewed with patient/caregiver?  Yes   Is the patient having any side effects they believe may be caused by any medication additions or changes?  No   Does the patient have all medications ordered at discharge?  Yes   Is the patient taking all medications as directed (includes completed medication regime)?  Yes   Comments regarding appointments  Urology on Thurs 01/30/2020   Does the patient have a primary care provider?   Yes   Does the patient have an appointment with their PCP within 7 days of discharge?  Yes   Has the patient kept scheduled appointments due by today?  Yes   Has home health visited the patient within 72 hours of discharge?  N/A   Psychosocial issues?  No   Did the patient receive a copy of their discharge instructions?  Yes   Nursing interventions  Reviewed instructions with patient   What is the patient's perception of their health status since discharge?  Improving   Is the patient/caregiver able to teach back signs and symptoms related to disease process for when to call PCP?  Yes   Is the patient/caregiver able to teach back signs and symptoms related to disease process for when to call 911?  Yes   Is the patient/caregiver able to teach back the hierarchy of who to call/visit for symptoms/problems? PCP, Specialist, Home health nurse, Urgent Care, ED, 911  Yes   Week 1 call completed?  Yes          Austin Benitez RN

## 2020-02-03 ENCOUNTER — READMISSION MANAGEMENT (OUTPATIENT)
Dept: CALL CENTER | Facility: HOSPITAL | Age: 85
End: 2020-02-03

## 2020-02-03 NOTE — OUTREACH NOTE
Medical Week 2 Survey      Responses   Facility patient discharged from?  [facility]   Does the patient have one of the following disease processes/diagnoses(primary or secondary)?  Other   Week 2 attempt successful?  No   Unsuccessful attempts  Attempt 1          Danielle Dudley RN

## 2020-02-04 ENCOUNTER — READMISSION MANAGEMENT (OUTPATIENT)
Dept: CALL CENTER | Facility: HOSPITAL | Age: 85
End: 2020-02-04

## 2020-02-04 NOTE — OUTREACH NOTE
Medical Week 2 Survey      Responses   Facility patient discharged from?  [facility]   Does the patient have one of the following disease processes/diagnoses(primary or secondary)?  Other   Week 2 attempt successful?  No   Unsuccessful attempts  Attempt 2          Regi Carrasco RN

## 2020-02-11 ENCOUNTER — READMISSION MANAGEMENT (OUTPATIENT)
Dept: CALL CENTER | Facility: HOSPITAL | Age: 85
End: 2020-02-11

## 2020-02-11 NOTE — OUTREACH NOTE
Medical Week 4 Survey      Responses   Facility patient discharged from?  [facility]   Does the patient have one of the following disease processes/diagnoses(primary or secondary)?  Other   Week 4 attempt successful?  No          Lelo Quintana RN

## 2020-02-29 ENCOUNTER — HOSPITAL ENCOUNTER (INPATIENT)
Facility: HOSPITAL | Age: 85
LOS: 7 days | Discharge: NURSING, REHAB OR LONG-TERM CARE | End: 2020-03-07
Attending: EMERGENCY MEDICINE | Admitting: INTERNAL MEDICINE

## 2020-02-29 ENCOUNTER — APPOINTMENT (OUTPATIENT)
Dept: GENERAL RADIOLOGY | Facility: HOSPITAL | Age: 85
End: 2020-02-29

## 2020-02-29 DIAGNOSIS — F41.9 ANXIETY DISORDER, UNSPECIFIED TYPE: ICD-10-CM

## 2020-02-29 DIAGNOSIS — S93.402A SPRAIN OF LEFT ANKLE, UNSPECIFIED LIGAMENT, INITIAL ENCOUNTER: ICD-10-CM
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
## 2020-02-29 DIAGNOSIS — T14.8XXA SKIN AVULSION: ICD-10-CM

## 2020-02-29 DIAGNOSIS — S72.002A CLOSED FRACTURE OF LEFT HIP, INITIAL ENCOUNTER (HCC): Primary | ICD-10-CM

## 2020-02-29 LAB
ALBUMIN SERPL-MCNC: 4.1 G/DL (ref 3.5–5.2)
ALBUMIN/GLOB SERPL: 1.3 G/DL
ALP SERPL-CCNC: 81 U/L (ref 39–117)
ALT SERPL W P-5'-P-CCNC: 35 U/L (ref 1–33)
ANION GAP SERPL CALCULATED.3IONS-SCNC: 14 MMOL/L (ref 5–15)
AST SERPL-CCNC: 62 U/L (ref 1–32)
BASOPHILS # BLD AUTO: 0.03 10*3/MM3 (ref 0–0.2)
BASOPHILS NFR BLD AUTO: 0.3 % (ref 0–1.5)
BILIRUB SERPL-MCNC: 0.7 MG/DL (ref 0.2–1.2)
BUN BLD-MCNC: 19 MG/DL (ref 8–23)
BUN/CREAT SERPL: 23.8 (ref 7–25)
CALCIUM SPEC-SCNC: 9.5 MG/DL (ref 8.6–10.5)
CHLORIDE SERPL-SCNC: 103 MMOL/L (ref 98–107)
CK SERPL-CCNC: 63 U/L (ref 20–180)
CO2 SERPL-SCNC: 23 MMOL/L (ref 22–29)
CREAT BLD-MCNC: 0.8 MG/DL (ref 0.57–1)
DEPRECATED RDW RBC AUTO: 42.7 FL (ref 37–54)
EOSINOPHIL # BLD AUTO: 0.03 10*3/MM3 (ref 0–0.4)
EOSINOPHIL NFR BLD AUTO: 0.3 % (ref 0.3–6.2)
ERYTHROCYTE [DISTWIDTH] IN BLOOD BY AUTOMATED COUNT: 12.3 % (ref 12.3–15.4)
GFR SERPL CREATININE-BSD FRML MDRD: 68 ML/MIN/1.73
GLOBULIN UR ELPH-MCNC: 3.1 GM/DL
GLUCOSE BLD-MCNC: 118 MG/DL (ref 65–99)
HCT VFR BLD AUTO: 40.9 % (ref 34–46.6)
HGB BLD-MCNC: 13.5 G/DL (ref 12–15.9)
IMM GRANULOCYTES # BLD AUTO: 0.05 10*3/MM3 (ref 0–0.05)
IMM GRANULOCYTES NFR BLD AUTO: 0.5 % (ref 0–0.5)
LYMPHOCYTES # BLD AUTO: 2.05 10*3/MM3 (ref 0.7–3.1)
LYMPHOCYTES NFR BLD AUTO: 18.9 % (ref 19.6–45.3)
MCH RBC QN AUTO: 30.9 PG (ref 26.6–33)
MCHC RBC AUTO-ENTMCNC: 33 G/DL (ref 31.5–35.7)
MCV RBC AUTO: 93.6 FL (ref 79–97)
MONOCYTES # BLD AUTO: 0.53 10*3/MM3 (ref 0.1–0.9)
MONOCYTES NFR BLD AUTO: 4.9 % (ref 5–12)
NEUTROPHILS # BLD AUTO: 8.14 10*3/MM3 (ref 1.7–7)
NEUTROPHILS NFR BLD AUTO: 75.1 % (ref 42.7–76)
NRBC BLD AUTO-RTO: 0 /100 WBC (ref 0–0.2)
PLATELET # BLD AUTO: 175 10*3/MM3 (ref 140–450)
PMV BLD AUTO: 11.4 FL (ref 6–12)
POTASSIUM BLD-SCNC: 3.6 MMOL/L (ref 3.5–5.2)
PROT SERPL-MCNC: 7.2 G/DL (ref 6–8.5)
RBC # BLD AUTO: 4.37 10*6/MM3 (ref 3.77–5.28)
SODIUM BLD-SCNC: 140 MMOL/L (ref 136–145)
WBC NRBC COR # BLD: 10.83 10*3/MM3 (ref 3.4–10.8)

## 2020-02-29 PROCEDURE — 25010000002 HYDROMORPHONE PER 4 MG

## 2020-02-29 PROCEDURE — 80053 COMPREHEN METABOLIC PANEL: CPT | Performed by: EMERGENCY MEDICINE

## 2020-02-29 PROCEDURE — 86901 BLOOD TYPING SEROLOGIC RH(D): CPT

## 2020-02-29 PROCEDURE — 99284 EMERGENCY DEPT VISIT MOD MDM: CPT | Performed by: EMERGENCY MEDICINE

## 2020-02-29 PROCEDURE — 82550 ASSAY OF CK (CPK): CPT | Performed by: INTERNAL MEDICINE

## 2020-02-29 PROCEDURE — 86900 BLOOD TYPING SEROLOGIC ABO: CPT

## 2020-02-29 PROCEDURE — 73502 X-RAY EXAM HIP UNI 2-3 VIEWS: CPT

## 2020-02-29 PROCEDURE — 73610 X-RAY EXAM OF ANKLE: CPT

## 2020-02-29 PROCEDURE — 25010000002 ONDANSETRON PER 1 MG

## 2020-02-29 PROCEDURE — 85025 COMPLETE CBC W/AUTO DIFF WBC: CPT | Performed by: EMERGENCY MEDICINE

## 2020-02-29 PROCEDURE — 99284 EMERGENCY DEPT VISIT MOD MDM: CPT

## 2020-02-29 PROCEDURE — 25010000002 TETANUS-DIPHTHERIA TOXOIDS (ADULT) PER 0.5 ML: Performed by: EMERGENCY MEDICINE

## 2020-02-29 PROCEDURE — 90714 TD VACC NO PRESV 7 YRS+ IM: CPT | Performed by: EMERGENCY MEDICINE

## 2020-02-29 PROCEDURE — 99223 1ST HOSP IP/OBS HIGH 75: CPT | Performed by: INTERNAL MEDICINE

## 2020-02-29 PROCEDURE — 71045 X-RAY EXAM CHEST 1 VIEW: CPT

## 2020-02-29 RX ORDER — CETIRIZINE HYDROCHLORIDE 10 MG/1
10 TABLET ORAL DAILY
Status: DISCONTINUED | OUTPATIENT
Start: 2020-03-01 | End: 2020-03-07 | Stop reason: HOSPADM

## 2020-02-29 RX ORDER — TIMOLOL MALEATE 5 MG/ML
1 SOLUTION/ DROPS OPHTHALMIC 2 TIMES DAILY
Status: DISCONTINUED | OUTPATIENT
Start: 2020-03-01 | End: 2020-03-01

## 2020-02-29 RX ORDER — NALOXONE HCL 0.4 MG/ML
0.4 VIAL (ML) INJECTION
Status: DISCONTINUED | OUTPATIENT
Start: 2020-02-29 | End: 2020-03-03

## 2020-02-29 RX ORDER — LATANOPROST 50 UG/ML
1 SOLUTION/ DROPS OPHTHALMIC NIGHTLY
Status: DISCONTINUED | OUTPATIENT
Start: 2020-03-01 | End: 2020-03-01

## 2020-02-29 RX ORDER — CLOTRIMAZOLE AND BETAMETHASONE DIPROPIONATE 10; .64 MG/G; MG/G
1 CREAM TOPICAL 2 TIMES DAILY PRN
Status: DISCONTINUED | OUTPATIENT
Start: 2020-02-29 | End: 2020-03-07 | Stop reason: HOSPADM

## 2020-02-29 RX ORDER — PANTOPRAZOLE SODIUM 40 MG/1
40 TABLET, DELAYED RELEASE ORAL DAILY
COMMUNITY

## 2020-02-29 RX ORDER — AMOXICILLIN 250 MG
2 CAPSULE ORAL 2 TIMES DAILY
Status: DISCONTINUED | OUTPATIENT
Start: 2020-02-29 | End: 2020-03-07 | Stop reason: HOSPADM

## 2020-02-29 RX ORDER — LEVOTHYROXINE SODIUM 0.05 MG/1
50 TABLET ORAL
Status: DISCONTINUED | OUTPATIENT
Start: 2020-03-01 | End: 2020-03-07 | Stop reason: HOSPADM

## 2020-02-29 RX ORDER — CHOLECALCIFEROL (VITAMIN D3) 125 MCG
5 CAPSULE ORAL NIGHTLY PRN
Status: DISCONTINUED | OUTPATIENT
Start: 2020-02-29 | End: 2020-03-01

## 2020-02-29 RX ORDER — SODIUM CHLORIDE, SODIUM LACTATE, POTASSIUM CHLORIDE, CALCIUM CHLORIDE 600; 310; 30; 20 MG/100ML; MG/100ML; MG/100ML; MG/100ML
100 INJECTION, SOLUTION INTRAVENOUS CONTINUOUS
Status: DISCONTINUED | OUTPATIENT
Start: 2020-02-29 | End: 2020-03-04

## 2020-02-29 RX ORDER — ACETAMINOPHEN 325 MG/1
650 TABLET ORAL EVERY 4 HOURS PRN
Status: DISCONTINUED | OUTPATIENT
Start: 2020-02-29 | End: 2020-03-07 | Stop reason: HOSPADM

## 2020-02-29 RX ORDER — HYDROMORPHONE HYDROCHLORIDE 1 MG/ML
1 INJECTION, SOLUTION INTRAMUSCULAR; INTRAVENOUS; SUBCUTANEOUS
Status: DISCONTINUED | OUTPATIENT
Start: 2020-02-29 | End: 2020-03-02

## 2020-02-29 RX ORDER — LORAZEPAM 1 MG/1
2 TABLET ORAL NIGHTLY
Status: DISCONTINUED | OUTPATIENT
Start: 2020-03-01 | End: 2020-03-07 | Stop reason: HOSPADM

## 2020-02-29 RX ORDER — ONDANSETRON 2 MG/ML
INJECTION INTRAMUSCULAR; INTRAVENOUS
Status: COMPLETED
Start: 2020-02-29 | End: 2020-02-29

## 2020-02-29 RX ORDER — ASPIRIN 81 MG/1
81 TABLET, CHEWABLE ORAL DAILY
Status: DISCONTINUED | OUTPATIENT
Start: 2020-03-01 | End: 2020-02-29

## 2020-02-29 RX ORDER — FLUTICASONE PROPIONATE 50 MCG
2 SPRAY, SUSPENSION (ML) NASAL DAILY
Status: DISCONTINUED | OUTPATIENT
Start: 2020-03-01 | End: 2020-03-07 | Stop reason: HOSPADM

## 2020-02-29 RX ORDER — HYDROMORPHONE HCL 110MG/55ML
PATIENT CONTROLLED ANALGESIA SYRINGE INTRAVENOUS
Status: COMPLETED
Start: 2020-02-29 | End: 2020-02-29

## 2020-02-29 RX ORDER — DORZOLAMIDE HCL 20 MG/ML
1 SOLUTION/ DROPS OPHTHALMIC 2 TIMES DAILY
Status: DISCONTINUED | OUTPATIENT
Start: 2020-03-01 | End: 2020-03-01

## 2020-02-29 RX ORDER — LORAZEPAM 1 MG/1
1 TABLET ORAL 3 TIMES DAILY
Status: DISCONTINUED | OUTPATIENT
Start: 2020-03-01 | End: 2020-02-29

## 2020-02-29 RX ORDER — NALOXONE HCL 0.4 MG/ML
0.4 VIAL (ML) INJECTION
Status: DISCONTINUED | OUTPATIENT
Start: 2020-02-29 | End: 2020-03-06

## 2020-02-29 RX ORDER — BISACODYL 5 MG/1
5 TABLET, DELAYED RELEASE ORAL DAILY PRN
Status: DISCONTINUED | OUTPATIENT
Start: 2020-02-29 | End: 2020-03-07 | Stop reason: HOSPADM

## 2020-02-29 RX ORDER — HYDROMORPHONE HCL 110MG/55ML
0.5 PATIENT CONTROLLED ANALGESIA SYRINGE INTRAVENOUS
Status: DISCONTINUED | OUTPATIENT
Start: 2020-02-29 | End: 2020-02-29

## 2020-02-29 RX ORDER — HEPARIN SODIUM 5000 [USP'U]/ML
5000 INJECTION, SOLUTION INTRAVENOUS; SUBCUTANEOUS EVERY 8 HOURS SCHEDULED
Status: DISCONTINUED | OUTPATIENT
Start: 2020-02-29 | End: 2020-02-29

## 2020-02-29 RX ORDER — SODIUM CHLORIDE 0.9 % (FLUSH) 0.9 %
10 SYRINGE (ML) INJECTION AS NEEDED
Status: DISCONTINUED | OUTPATIENT
Start: 2020-02-29 | End: 2020-03-07 | Stop reason: HOSPADM

## 2020-02-29 RX ORDER — NALOXONE HCL 0.4 MG/ML
0.4 VIAL (ML) INJECTION
Status: DISCONTINUED | OUTPATIENT
Start: 2020-02-29 | End: 2020-02-29

## 2020-02-29 RX ORDER — DICYCLOMINE HYDROCHLORIDE 10 MG/1
10 CAPSULE ORAL
Status: DISCONTINUED | OUTPATIENT
Start: 2020-02-29 | End: 2020-03-07 | Stop reason: HOSPADM

## 2020-02-29 RX ORDER — MORPHINE SULFATE 2 MG/ML
1 INJECTION, SOLUTION INTRAMUSCULAR; INTRAVENOUS EVERY 4 HOURS PRN
Status: DISCONTINUED | OUTPATIENT
Start: 2020-02-29 | End: 2020-03-03

## 2020-02-29 RX ORDER — GUAIFENESIN 600 MG/1
600 TABLET, EXTENDED RELEASE ORAL EVERY 12 HOURS PRN
Status: DISCONTINUED | OUTPATIENT
Start: 2020-02-29 | End: 2020-03-07 | Stop reason: HOSPADM

## 2020-02-29 RX ORDER — PANTOPRAZOLE SODIUM 40 MG/1
40 TABLET, DELAYED RELEASE ORAL
Status: DISCONTINUED | OUTPATIENT
Start: 2020-03-01 | End: 2020-03-07 | Stop reason: HOSPADM

## 2020-02-29 RX ORDER — LORAZEPAM 1 MG/1
1 TABLET ORAL DAILY
Status: DISCONTINUED | OUTPATIENT
Start: 2020-03-01 | End: 2020-03-05

## 2020-02-29 RX ORDER — LISINOPRIL 20 MG/1
20 TABLET ORAL DAILY
Status: DISCONTINUED | OUTPATIENT
Start: 2020-03-01 | End: 2020-03-07 | Stop reason: HOSPADM

## 2020-02-29 RX ORDER — SODIUM CHLORIDE 0.9 % (FLUSH) 0.9 %
10 SYRINGE (ML) INJECTION EVERY 12 HOURS SCHEDULED
Status: DISCONTINUED | OUTPATIENT
Start: 2020-02-29 | End: 2020-03-07 | Stop reason: HOSPADM

## 2020-02-29 RX ORDER — ONDANSETRON 2 MG/ML
4 INJECTION INTRAMUSCULAR; INTRAVENOUS ONCE
Status: COMPLETED | OUTPATIENT
Start: 2020-02-29 | End: 2020-02-29

## 2020-02-29 RX ORDER — SODIUM CHLORIDE 9 MG/ML
40 INJECTION, SOLUTION INTRAVENOUS AS NEEDED
Status: DISCONTINUED | OUTPATIENT
Start: 2020-02-29 | End: 2020-03-07 | Stop reason: HOSPADM

## 2020-02-29 RX ADMIN — HYDROMORPHONE HYDROCHLORIDE 0.5 MG: 2 INJECTION, SOLUTION INTRAMUSCULAR; INTRAVENOUS; SUBCUTANEOUS at 21:05

## 2020-02-29 RX ADMIN — ONDANSETRON 4 MG: 2 INJECTION INTRAMUSCULAR; INTRAVENOUS at 21:03

## 2020-02-29 RX ADMIN — ONDANSETRON HYDROCHLORIDE 4 MG: 2 INJECTION, SOLUTION INTRAMUSCULAR; INTRAVENOUS at 21:03

## 2020-02-29 RX ADMIN — CLOSTRIDIUM TETANI TOXOID ANTIGEN (FORMALDEHYDE INACTIVATED) AND CORYNEBACTERIUM DIPHTHERIAE TOXOID ANTIGEN (FORMALDEHYDE INACTIVATED) 0.5 ML: 5; 2 INJECTION, SUSPENSION INTRAMUSCULAR at 20:24

## 2020-02-29 RX ADMIN — Medication 0.5 MG: at 21:05

## 2020-03-01 PROBLEM — M80.052A PATHOLOGICAL FRACTURE OF LEFT HIP DUE TO OSTEOPOROSIS (HCC): Chronic | Status: ACTIVE | Noted: 2020-03-01

## 2020-03-01 PROBLEM — K64.9 HEMORRHOID: Status: ACTIVE | Noted: 2020-03-01

## 2020-03-01 LAB
ABO GROUP BLD: NORMAL
ABO GROUP BLD: NORMAL
APTT PPP: 32.6 SECONDS (ref 24.3–38.1)
BLD GP AB SCN SERPL QL: NEGATIVE
INR PPP: 1.1 (ref 0.9–1.1)
PROTHROMBIN TIME: 14 SECONDS (ref 12.1–15)
RH BLD: POSITIVE
RH BLD: POSITIVE
T&S EXPIRATION DATE: NORMAL

## 2020-03-01 PROCEDURE — 25010000002 HYDROMORPHONE PER 4 MG: Performed by: INTERNAL MEDICINE

## 2020-03-01 PROCEDURE — 85610 PROTHROMBIN TIME: CPT | Performed by: ORTHOPAEDIC SURGERY

## 2020-03-01 PROCEDURE — 94799 UNLISTED PULMONARY SVC/PX: CPT

## 2020-03-01 PROCEDURE — 86900 BLOOD TYPING SEROLOGIC ABO: CPT | Performed by: ORTHOPAEDIC SURGERY

## 2020-03-01 PROCEDURE — 93005 ELECTROCARDIOGRAM TRACING: CPT | Performed by: ORTHOPAEDIC SURGERY

## 2020-03-01 PROCEDURE — 86901 BLOOD TYPING SEROLOGIC RH(D): CPT | Performed by: ORTHOPAEDIC SURGERY

## 2020-03-01 PROCEDURE — 99222 1ST HOSP IP/OBS MODERATE 55: CPT | Performed by: ORTHOPAEDIC SURGERY

## 2020-03-01 PROCEDURE — 93010 ELECTROCARDIOGRAM REPORT: CPT | Performed by: INTERNAL MEDICINE

## 2020-03-01 PROCEDURE — 85730 THROMBOPLASTIN TIME PARTIAL: CPT | Performed by: ORTHOPAEDIC SURGERY

## 2020-03-01 PROCEDURE — 86850 RBC ANTIBODY SCREEN: CPT | Performed by: ORTHOPAEDIC SURGERY

## 2020-03-01 PROCEDURE — 99233 SBSQ HOSP IP/OBS HIGH 50: CPT | Performed by: INTERNAL MEDICINE

## 2020-03-01 RX ORDER — LABETALOL HYDROCHLORIDE 5 MG/ML
10 INJECTION, SOLUTION INTRAVENOUS
Status: DISCONTINUED | OUTPATIENT
Start: 2020-03-01 | End: 2020-03-01

## 2020-03-01 RX ORDER — LATANOPROST 50 UG/ML
1 SOLUTION/ DROPS OPHTHALMIC NIGHTLY
Status: DISCONTINUED | OUTPATIENT
Start: 2020-03-01 | End: 2020-03-07 | Stop reason: HOSPADM

## 2020-03-01 RX ORDER — BRINZOLAMIDE 10 MG/ML
1 SUSPENSION/ DROPS OPHTHALMIC 3 TIMES DAILY
Status: DISCONTINUED | OUTPATIENT
Start: 2020-03-01 | End: 2020-03-01 | Stop reason: ALTCHOICE

## 2020-03-01 RX ORDER — BRIMONIDINE TARTRATE 2 MG/ML
1 SOLUTION/ DROPS OPHTHALMIC 2 TIMES DAILY
Status: DISCONTINUED | OUTPATIENT
Start: 2020-03-01 | End: 2020-03-07 | Stop reason: HOSPADM

## 2020-03-01 RX ORDER — LORAZEPAM 2 MG/ML
0.5 INJECTION INTRAMUSCULAR EVERY 4 HOURS PRN
Status: DISCONTINUED | OUTPATIENT
Start: 2020-03-01 | End: 2020-03-02

## 2020-03-01 RX ORDER — HALOPERIDOL 5 MG/ML
1 INJECTION INTRAMUSCULAR EVERY 6 HOURS PRN
Status: DISCONTINUED | OUTPATIENT
Start: 2020-03-01 | End: 2020-03-02

## 2020-03-01 RX ORDER — LABETALOL HYDROCHLORIDE 5 MG/ML
10 INJECTION, SOLUTION INTRAVENOUS EVERY 6 HOURS
Status: DISCONTINUED | OUTPATIENT
Start: 2020-03-01 | End: 2020-03-06

## 2020-03-01 RX ORDER — CHOLECALCIFEROL (VITAMIN D3) 125 MCG
10 CAPSULE ORAL NIGHTLY
Status: DISCONTINUED | OUTPATIENT
Start: 2020-03-02 | End: 2020-03-07 | Stop reason: HOSPADM

## 2020-03-01 RX ORDER — PROMETHAZINE HYDROCHLORIDE 25 MG/ML
12.5 INJECTION, SOLUTION INTRAMUSCULAR; INTRAVENOUS EVERY 6 HOURS PRN
Status: DISCONTINUED | OUTPATIENT
Start: 2020-03-01 | End: 2020-03-02

## 2020-03-01 RX ORDER — TIMOLOL MALEATE 5 MG/ML
1 SOLUTION/ DROPS OPHTHALMIC EVERY 12 HOURS SCHEDULED
Status: DISCONTINUED | OUTPATIENT
Start: 2020-03-01 | End: 2020-03-07 | Stop reason: HOSPADM

## 2020-03-01 RX ORDER — DORZOLAMIDE HCL 20 MG/ML
1 SOLUTION/ DROPS OPHTHALMIC 2 TIMES DAILY
Status: DISCONTINUED | OUTPATIENT
Start: 2020-03-01 | End: 2020-03-07 | Stop reason: HOSPADM

## 2020-03-01 RX ORDER — TRAMADOL HYDROCHLORIDE 50 MG/1
100 TABLET ORAL EVERY 4 HOURS PRN
Status: DISCONTINUED | OUTPATIENT
Start: 2020-03-01 | End: 2020-03-02

## 2020-03-01 RX ORDER — HYDRALAZINE HYDROCHLORIDE 20 MG/ML
10 INJECTION INTRAMUSCULAR; INTRAVENOUS EVERY 6 HOURS PRN
Status: DISCONTINUED | OUTPATIENT
Start: 2020-03-01 | End: 2020-03-07 | Stop reason: HOSPADM

## 2020-03-01 RX ORDER — ONDANSETRON 2 MG/ML
4 INJECTION INTRAMUSCULAR; INTRAVENOUS EVERY 8 HOURS PRN
Status: DISCONTINUED | OUTPATIENT
Start: 2020-03-01 | End: 2020-03-01

## 2020-03-01 RX ORDER — CHOLECALCIFEROL (VITAMIN D3) 125 MCG
10 CAPSULE ORAL NIGHTLY PRN
Status: DISCONTINUED | OUTPATIENT
Start: 2020-03-01 | End: 2020-03-01

## 2020-03-01 RX ORDER — ONDANSETRON 2 MG/ML
4 INJECTION INTRAMUSCULAR; INTRAVENOUS EVERY 6 HOURS PRN
Status: DISCONTINUED | OUTPATIENT
Start: 2020-03-01 | End: 2020-03-07 | Stop reason: HOSPADM

## 2020-03-01 RX ADMIN — LABETALOL HYDROCHLORIDE 10 MG: 5 INJECTION INTRAVENOUS at 16:46

## 2020-03-01 RX ADMIN — HYDROMORPHONE HYDROCHLORIDE 1 MG: 1 INJECTION, SOLUTION INTRAMUSCULAR; INTRAVENOUS; SUBCUTANEOUS at 11:46

## 2020-03-01 RX ADMIN — LORAZEPAM 1 MG: 1 TABLET ORAL at 07:43

## 2020-03-01 RX ADMIN — SODIUM CHLORIDE, PRESERVATIVE FREE 10 ML: 5 INJECTION INTRAVENOUS at 20:07

## 2020-03-01 RX ADMIN — BRIMONIDINE TARTRATE 1 DROP: 2 SOLUTION/ DROPS OPHTHALMIC at 20:07

## 2020-03-01 RX ADMIN — DORZOLAMIDE HCL 1 DROP: 20 SOLUTION/ DROPS OPHTHALMIC at 20:06

## 2020-03-01 RX ADMIN — SENNOSIDES AND DOCUSATE SODIUM 2 TABLET: 8.6; 5 TABLET ORAL at 07:44

## 2020-03-01 RX ADMIN — BRIMONIDINE TARTRATE 1 DROP: 2 SOLUTION/ DROPS OPHTHALMIC at 07:38

## 2020-03-01 RX ADMIN — LABETALOL HYDROCHLORIDE 10 MG: 5 INJECTION INTRAVENOUS at 22:41

## 2020-03-01 RX ADMIN — LEVOTHYROXINE SODIUM 50 MCG: 50 TABLET ORAL at 07:42

## 2020-03-01 RX ADMIN — HYDROMORPHONE HYDROCHLORIDE 1 MG: 1 INJECTION, SOLUTION INTRAMUSCULAR; INTRAVENOUS; SUBCUTANEOUS at 05:52

## 2020-03-01 RX ADMIN — CETIRIZINE HYDROCHLORIDE 10 MG: 10 TABLET, FILM COATED ORAL at 07:40

## 2020-03-01 RX ADMIN — LORAZEPAM 2 MG: 1 TABLET ORAL at 20:05

## 2020-03-01 RX ADMIN — LISINOPRIL 20 MG: 20 TABLET ORAL at 07:43

## 2020-03-01 RX ADMIN — LORAZEPAM 2 MG: 1 TABLET ORAL at 01:20

## 2020-03-01 RX ADMIN — TRAMADOL HYDROCHLORIDE 100 MG: 50 TABLET, FILM COATED ORAL at 14:49

## 2020-03-01 RX ADMIN — DILTIAZEM HYDROCHLORIDE 30 MG: 30 TABLET, FILM COATED ORAL at 20:05

## 2020-03-01 RX ADMIN — LATANOPROST 1 DROP: 50 SOLUTION OPHTHALMIC at 20:06

## 2020-03-01 RX ADMIN — SODIUM CHLORIDE, POTASSIUM CHLORIDE, SODIUM LACTATE AND CALCIUM CHLORIDE 100 ML/HR: 600; 310; 30; 20 INJECTION, SOLUTION INTRAVENOUS at 01:19

## 2020-03-01 RX ADMIN — TIMOLOL MALEATE 1 DROP: 5 SOLUTION/ DROPS OPHTHALMIC at 07:45

## 2020-03-01 RX ADMIN — DORZOLAMIDE HCL 1 DROP: 20 SOLUTION/ DROPS OPHTHALMIC at 07:40

## 2020-03-01 RX ADMIN — SODIUM CHLORIDE, POTASSIUM CHLORIDE, SODIUM LACTATE AND CALCIUM CHLORIDE 100 ML/HR: 600; 310; 30; 20 INJECTION, SOLUTION INTRAVENOUS at 20:13

## 2020-03-01 RX ADMIN — SENNOSIDES AND DOCUSATE SODIUM 2 TABLET: 8.6; 5 TABLET ORAL at 20:05

## 2020-03-01 RX ADMIN — SODIUM CHLORIDE, POTASSIUM CHLORIDE, SODIUM LACTATE AND CALCIUM CHLORIDE 100 ML/HR: 600; 310; 30; 20 INJECTION, SOLUTION INTRAVENOUS at 11:38

## 2020-03-01 RX ADMIN — TIMOLOL MALEATE 1 DROP: 5 SOLUTION/ DROPS OPHTHALMIC at 20:06

## 2020-03-01 RX ADMIN — TRAMADOL HYDROCHLORIDE 100 MG: 50 TABLET, FILM COATED ORAL at 20:05

## 2020-03-01 RX ADMIN — FLUTICASONE PROPIONATE 2 SPRAY: 50 SPRAY, METERED NASAL at 07:41

## 2020-03-01 RX ADMIN — SODIUM CHLORIDE, PRESERVATIVE FREE 10 ML: 5 INJECTION INTRAVENOUS at 01:20

## 2020-03-01 RX ADMIN — PANTOPRAZOLE SODIUM 40 MG: 40 TABLET, DELAYED RELEASE ORAL at 07:44

## 2020-03-01 RX ADMIN — DILTIAZEM HYDROCHLORIDE 30 MG: 30 TABLET, FILM COATED ORAL at 01:20

## 2020-03-01 RX ADMIN — MELATONIN TAB 5 MG 10 MG: 5 TAB at 20:05

## 2020-03-01 NOTE — SIGNIFICANT NOTE
03/01/20 0902   Rehab Time/Intention   Evaluation Not Performed   (patient with hip fracture, will await ortho consult post surgery.)   Rehab Treatment   Discipline physical therapist

## 2020-03-01 NOTE — NURSING NOTE
Discharge Planning Assessment   Dania Boland     Patient Name: Paulo Hampton  MRN: [id number]  Today's Date: 3/1/2020    Admit Date: 2/29/2020    Discharge Needs Assessment     Row Name 03/01/20 1346       Living Environment    Lives With  alone    Current Living Arrangements  home/apartment/condo    Primary Care Provided by  self    Family Caregiver if Needed  child(vitor), adult;grandchild(vitor), adult    Quality of Family Relationships  involved;supportive    Able to Return to Prior Arrangements  -- to be determined       Transition Planning    Patient/Family Anticipates Transition to  -- to be determined    Transportation Anticipated  family or friend will provide       Discharge Needs Assessment    Readmission Within the Last 30 Days  no previous admission in last 30 days    Concerns to be Addressed  -- will follow post surgery    Equipment Currently Used at Home  none    Equipment Needed After Discharge  -- to be determined    Provided Post Acute Provider List?  N/A    N/A Provider List Comment  will follow post surgery        Discharge Plan     Row Name 03/01/20 1343       Plan    Plan  plan to be determined post surgery     Patient/Family in Agreement with Plan  yes    Plan Comments  Spoke with patients granddaughter, Suki Morrow, at bedside. RN staff at bedside repositioning patient & lab arrives to room. Suki states the patient lives alone in a home in [place]. She is typically independent of ADLs but does not drive. Family lives nearby to assist and provide transportation as needed. The patient uses a straight cane, rw and rollator - but ambulates unassisted when she does not feel dizzy. No additional DME, home 02,cpap/bipap noted. She believes the patient has used HH in the past, but does not know the agency. She does not think the patient has used rehab services previously. There has a living will on file. The patient uses [facility] and family picks up medications without issue. Suki  anticpates the patient will need surgery and asks about [facility] or [facility]. Dr Dickinson enters room and states will plan for surgery tomorrow 3/2. CM will follow up for dc needs and placement preference after surgery.        Destination      Coordination has not been started for this encounter.      Durable Medical Equipment      Coordination has not been started for this encounter.      Dialysis/Infusion      Coordination has not been started for this encounter.      Home Medical Care      Coordination has not been started for this encounter.      Therapy      Coordination has not been started for this encounter.      Community Resources      Coordination has not been started for this encounter.          Demographic Summary     Row Name 03/01/20 1346       General Information    Admission Type  inpatient    Arrived From  home    Referral Source  admission list    Reason for Consult  discharge planning    Preferred Language  English     Used During This Interaction  no       Contact Information    Permission Granted to Share Info With          Functional Status    No documentation.       Psychosocial    No documentation.       Abuse/Neglect    No documentation.       Legal    No documentation.       Substance Abuse    No documentation.       Patient Forms    No documentation.           Jorge Wolf RN

## 2020-03-01 NOTE — H&P
"[facility] HOSPITALIST     Laci Freedman MD    CHIEF COMPLAINT: left hip pain    HISTORY OF PRESENT ILLNESS:    89WF with hypothyroidism and hypertension who developed a mechanical fall after tripping on her rug. At baseline, the patient is prescribed a cane and a walker to assist with ambulation but will ambulate without assistance when she is \"feeling good\", as in without dizziness.     She was seen in the ED for severe left hip pain following fall without LOC. No previous history of fragility fracutres. Describes pain as 9/10 dull without radiation and worse with moving. No numbness, tingling, coolness or loss of sensation or motor function. Can wiggle toes on demand.     The ED team kindly spoke with Orthopedics prior to my arrival and administered 0.5 mg IV Dilaudid.       Past Medical History:   Diagnosis Date   • Abdominal pain    • Anxiety    • Arthritis    • Atrial fibrillation (CMS/HCC)    • Bowel obstruction (CMS/HCC)    • C. difficile colitis    • Diarrhea    • GERD (gastroesophageal reflux disease)    • Hyperlipidemia    • Hypertension    • Hypothyroid    • Presbyesophagus    • Sinus congestion    • Thrush      Past Surgical History:   Procedure Laterality Date   • ABDOMINAL SURGERY     • APPENDECTOMY     • CHOLECYSTECTOMY     • ENDOSCOPY N/A 7/5/2017    Procedure: ESOPHAGOGASTRODUODENOSCOPY WITH DILATION;  Surgeon: Betito Montejo MD;  Location: [facility];  Service:    • HYSTERECTOMY     • KNEE ARTHROSCOPY Right    • THYROIDECTOMY       Family History   Problem Relation Age of Onset   • Heart disease Father    • Cancer Sister    • Cancer Brother      Social History     Tobacco Use   • Smoking status: Never Smoker   • Smokeless tobacco: Never Used   Substance Use Topics   • Alcohol use: No   • Drug use: No     Medications Prior to Admission   Medication Sig Dispense Refill Last Dose   • aspirin 81 MG chewable tablet Chew 81 mg Daily.   1/17/2020 at Unknown time   • " BRINZOLAMIDE - BRIMONIDONE 1-0.2% OPTHALMIC SUSPENSION Administer 0.2 % to the right eye 2 (Two) Times a Day.   Past Week at Unknown time   • clotrimazole-betamethasone (LOTRISONE) 1-0.05 % cream Apply 1 application topically 2 (Two) Times a Day As Needed (HEMMROIDS).   Unknown at Unknown time   • dicyclomine (BENTYL) 10 MG capsule Take 10 mg by mouth 4 (Four) Times a Day Before Meals & at Bedtime As Needed (ABDOMINAL PAIN).   12/13/2019 at 0700   • diltiaZEM (CARDIZEM) 30 MG tablet Take 30 mg by mouth 2 (Two) Times a Day.   Past Week at Unknown time   • dorzolamide (TRUSOPT) 2 % ophthalmic solution Administer 1 drop to the right eye 2 (Two) Times a Day.   1/17/2020 at Unknown time   • fluticasone (FLONASE) 50 MCG/ACT nasal spray 2 sprays into each nostril Daily.   1/17/2020 at Unknown time   • guaiFENesin (MUCINEX) 600 MG 12 hr tablet Take 600 mg by mouth Every 12 (Twelve) Hours As Needed for Cough.   More than a month at Unknown time   • latanoprost (XALATAN) 0.005 % ophthalmic solution Administer 1 drop to the right eye Every Night.   1/17/2020 at Unknown time   • levocetirizine (XYZAL) 5 MG tablet Take 5 mg by mouth Every Evening.   1/17/2020 at Unknown time   • levothyroxine (SYNTHROID) 50 MCG tablet Take 50 mcg by mouth Daily.   1/17/2020 at Unknown time   • lisinopril (PRINIVIL,ZESTRIL) 20 MG tablet Take 1 tablet by mouth Daily. Take one half tablet daily for 1 week then advanced to one full tablet daily 30 tablet 0 1/17/2020 at Unknown time   • LORazepam (ATIVAN) 1 MG tablet Take 1 mg by mouth 3 (Three) Times a Day. Takes 1 in AM; 2 at HS.  Per daughter in law pt PMD told pt she can take an extra at 5pm   1/17/2020 at Unknown time   • pantoprazole (PROTONIX) 40 MG EC tablet Take 40 mg by mouth Daily.      • Probiotic Product (PROBIOTIC-10 PO) Take  by mouth.      • timolol (TIMOPTIC) 0.5 % ophthalmic solution Administer 1 drop to the right eye 2 (Two) Times a Day.   1/17/2020 at Unknown time   • benzonatate  (TESSALON) 100 MG capsule Take 100 mg by mouth 3 (Three) Times a Day As Needed for Cough.   1/17/2020 at Unknown time   • Calcium Carbonate-Vitamin D (CALCIUM 500 + D PO) Take 1 tablet by mouth 2 (Two) Times a Day.   1/17/2020 at Unknown time   • hydrocortisone-pramoxine (PROCTOFOAM-HS) 1-1 % rectal foam Insert 1 applicator into the rectum 2 (Two) Times a Day. 10 g 0 More than a month at Unknown time   • meclizine (ANTIVERT) 12.5 MG tablet Take 12.5 mg by mouth 3 (Three) Times a Day As Needed for dizziness.   Unknown at Unknown time   • moxifloxacin (AVELOX) 400 MG tablet Take 1 tablet by mouth Daily. 5 tablet 0    • Multiple Vitamins-Minerals (CENTRUM SILVER PO) Take 1 tablet by mouth Daily.   1/17/2020 at Unknown time   • raNITIdine (ZANTAC) 150 MG tablet Take 150 mg by mouth 2 (Two) Times a Day.   1/17/2020 at Unknown time   • simethicone (MYLICON) 125 MG chewable tablet Chew 125 mg Every 6 (Six) Hours As Needed for Flatulence.   12/10/2019 at unkn     Allergies:  Biaxin [clarithromycin]; Ceftin [cefuroxime]; Celexa [citalopram]; Chlorpheniramine; Ciprofloxacin; Crestor [rosuvastatin]; Doxycycline; Effexor [venlafaxine]; Fluoxetine; Hydrocodone bitartrate er; Imipramine hcl; Levaquin [levofloxacin]; Lexapro [escitalopram oxalate]; Macrobid [nitrofurantoin monohyd macro]; Methscopolamine; Phenylpropanolamine; Prednisone; Promethazine; Simvastatin; Verapamil; Welchol [colesevelam hcl]; Zetia [ezetimibe]; Zoloft [sertraline hcl]; and Penicillins    Immunization History   Administered Date(s) Administered   • TD Preservative Free 02/29/2020       REVIEW OF SYSTEMS:  Please see the above history of present illness for pertinent positives and negatives.  The remainder of the patient's systems have been reviewed and are negative.     Vital Signs  Temp:  [97.9 °F (36.6 °C)-98.8 °F (37.1 °C)] 98.8 °F (37.1 °C)  Heart Rate:  [] 98  Resp:  [18-20] 18  BP: (174-207)/() 174/72  Flowsheet Rows      First Filed  "Value   Admission Height  144.8 cm (57\") Documented at 02/29/2020 1834   Admission Weight  62.3 kg (137 lb 6 oz) Documented at 02/29/2020 1834           Physical Exam   Constitutional: She is oriented to person, place, and time. She appears well-developed and well-nourished. No distress.   HENT:   Head: Normocephalic and atraumatic.   Right Ear: External ear normal.   Left Ear: External ear normal.   Eyes: Pupils are equal, round, and reactive to light. Conjunctivae and EOM are normal.   Neck: Normal range of motion. No thyromegaly present.   Cardiovascular: Normal rate and regular rhythm. Exam reveals no friction rub.   No murmur heard.  Pulmonary/Chest: Effort normal and breath sounds normal. No stridor. No respiratory distress.   Abdominal: Soft. Bowel sounds are normal. She exhibits no distension. There is no tenderness.   Musculoskeletal: She exhibits tenderness.        Left hip: She exhibits decreased range of motion, tenderness and swelling.   Neurological: She is alert and oriented to person, place, and time. No cranial nerve deficit. Coordination normal.   Skin: Skin is warm and dry. Capillary refill takes 2 to 3 seconds. She is not diaphoretic.   Psychiatric: She has a normal mood and affect.        Results Review:    I reviewed the patient's new clinical results.  Lab Results (most recent)     Procedure Component Value Units Date/Time    CK [[id number]]  (Normal) Collected:  02/29/20 2112    Specimen:  Blood Updated:  02/29/20 2226     Creatine Kinase 63 U/L     Comprehensive Metabolic Panel [[id number]]  (Abnormal) Collected:  02/29/20 2112    Specimen:  Blood Updated:  02/29/20 2134     Glucose 118 mg/dL      BUN 19 mg/dL      Creatinine 0.80 mg/dL      Sodium 140 mmol/L      Potassium 3.6 mmol/L      Chloride 103 mmol/L      CO2 23.0 mmol/L      Calcium 9.5 mg/dL      Total Protein 7.2 g/dL      Albumin 4.10 g/dL      ALT (SGPT) 35 U/L      AST (SGOT) 62 U/L      Comment: Specimen hemolyzed.  Results may " be affected.        Alkaline Phosphatase 81 U/L      Total Bilirubin 0.7 mg/dL      eGFR Non African Amer 68 mL/min/1.73      Globulin 3.1 gm/dL      A/G Ratio 1.3 g/dL      BUN/Creatinine Ratio 23.8     Anion Gap 14.0 mmol/L     Narrative:       GFR Normal >60  Chronic Kidney Disease <60  Kidney Failure <15      CBC & Differential [[id number]] Collected:  02/29/20 2112    Specimen:  Blood from Arm, Right Updated:  02/29/20 2118    Narrative:       The following orders were created for panel order CBC & Differential.  Procedure                               Abnormality         Status                     ---------                               -----------         ------                     CBC Auto Differential[[id number]]        Abnormal            Final result                 Please view results for these tests on the individual orders.    CBC Auto Differential [[id number]]  (Abnormal) Collected:  02/29/20 2112    Specimen:  Blood from Arm, Right Updated:  02/29/20 2118     WBC 10.83 10*3/mm3      RBC 4.37 10*6/mm3      Hemoglobin 13.5 g/dL      Hematocrit 40.9 %      MCV 93.6 fL      MCH 30.9 pg      MCHC 33.0 g/dL      RDW 12.3 %      RDW-SD 42.7 fl      MPV 11.4 fL      Platelets 175 10*3/mm3      Neutrophil % 75.1 %      Lymphocyte % 18.9 %      Monocyte % 4.9 %      Eosinophil % 0.3 %      Basophil % 0.3 %      Immature Grans % 0.5 %      Neutrophils, Absolute 8.14 10*3/mm3      Lymphocytes, Absolute 2.05 10*3/mm3      Monocytes, Absolute 0.53 10*3/mm3      Eosinophils, Absolute 0.03 10*3/mm3      Basophils, Absolute 0.03 10*3/mm3      Immature Grans, Absolute 0.05 10*3/mm3      nRBC 0.0 /100 WBC           Imaging Results (Most Recent)     Procedure Component Value Units Date/Time    XR Chest 1 View [[id number]] Collected:  02/29/20 2144     Updated:  02/29/20 2146    Narrative:       CR Chest 1 Vw 2/29/2020    INDICATION:   Left hip fracture status post fall today. Preop ORIF. History of atrial fibrillation and  benign essential hypertension.     COMPARISON:    7/9/2017    FINDINGS:  Single portable AP view(s) of the chest.    No visible support lines.    The heart and mediastinal contours are normal. The lungs are clear. No pneumothorax or pleural effusion.       Impression:       No acute cardiopulmonary findings.    Signer Name: Kris Quintana MD   Signed: 2/29/2020 9:44 PM   Workstation Name: [email or web address]    [facility]    XR Hip With or Without Pelvis 2 - 3 View Left [[id number]] Collected:  02/29/20 2038     Updated:  02/29/20 2041    Narrative:       CR Hip Uni Comp Min 2 Vws LT 2/29/2020    INDICATION:   Left hip pain status post fall today.    COMPARISON:   None available.    FINDINGS:  AP and frog-leg lateral view(s) of the left hip.  There is an impacted fracture involving the subcapital region of the left femoral neck. No other fracture is seen. The bones are normally mineralized. There is no soft tissue abnormality. No bone erosion  or destruction.        Impression:       Impacted fracture through the subcapital region of the left femoral neck.    Signer Name: Kris Quintana MD   Signed: 2/29/2020 8:38 PM   Workstation Name: [email or web address]    [facility]    XR Ankle 3+ View Left [[id number]] Collected:  02/29/20 2037     Updated:  02/29/20 2040    Narrative:       CR Ankle Min 3 Vws LT 2/29/2020    INDICATION:   Left ankle pain status post fall today..    COMPARISON:   None.    FINDINGS:  3 view(s) of the left ankle.  No fracture or dislocation. No bone erosion or destruction. No foreign body. Osteopenia. Soft tissue swelling overlying the lateral malleolus.        Impression:       Lateral soft tissue swelling. No fracture.    Signer Name: Kris Quintana MD   Signed: 2/29/2020 8:37 PM   Workstation Name: [email or web address]    [facility]        reviewed    ECG/EMG Results (most recent)     None        reviewed    Assessment/Plan     Postmenopausal 89WF with  hypothyroidism and hypertension who presents for closed L hip fragility fracture following a mechanical fall from ground height. She attempted to ambulate without assistance of a cane or walker. No evidence of arterial laceration or compartment syndrome based on clinical testing and lack of CK elevation.    #Closed left hip fragility fracture, POA, reason for admission  #Left ankle sprain, clinically diagnosed. POA.   #Osteoporosis, new diagnosis based on fragility fracture of l hip from standing height, POA, reason for admission.  #Periperative risk stratification  #Gait instability requiring ambulatory assistance  #Essential Hypertension, POA. No evidence of end organ damage  #Hypothyroidism    - NPO at midnight, hold DVT ppx  - Orthopedic surgery consultation  - Will need to follow up with Ortho regarding initiation of Bisphosphonate therapy  - Continue home antihypertensives. Added Labetalol IV for prn   - Continue home medications    - Continue home Ativan to avoid withdrawal. Cautious use of opiates in this setting.  - Maintain SpO2 >90% with supplemental O2. Suspect sleep apnea vs opiate induced hypoventilation to control pain given nocturnal hypoxia.   - Will need to follow up with PT/OT post-operatively.       - Braydon perioperative risk of MI or cardiac arrest intraoperatively or upt o 30 days post op: 0.5%  - B[place]don post-op respiratory failure (risk of mechanical vent >48 hours postop or reintubation at 30 days): 0.6%.     - Has a living will stating that she is DNR Full Treatment. She and her family are agreeable to reversing code status for the purpose of surgery.     I discussed the patients findings and my recommendations with patient, daughter and granddaughter.     aJy Chinchilla,   02/29/20  10:30 PM

## 2020-03-01 NOTE — CONSULTS
Orthopedic Consult      Patient: Paulo Hampton    Date of Admission: 2/29/2020  6:31 PM    YOB: 1930    Medical Record Number: [id number]    Attending Physician: Jay Chinchilla DO  Consulting Physician: Dr. Jeff Dickinson      Chief Complaints: Skin avulsion [T14.8XXA]  Closed fracture of left hip, initial encounter (CMS/[facility]) [S72.002A]  Fall, initial encounter [W19.XXXA]  Sprain of left ankle, unspecified ligament, initial encounter [S93.402A]      History of Present Illness: 89 y.o. female admitted to [facility] to services of Jay Chinchilla DO with Skin avulsion [T14.8XXA]  Closed fracture of left hip, initial encounter (CMS/[facility]) [S72.002A]  Fall, initial encounter [W19.XXXA]  Sprain of left ankle, unspecified ligament, initial encounter [S93.402A]. I was consulted for further evaluation and treatment. Onset of symptoms was yesterday around noon when patient fell at home and landed on her left hip and ankle, noted immediate onset of pain and inability to ambulate to her left lower extremity.  She also noted some injury to the soft tissues over her left elbow.  Denies prior pain to her left hip and groin region.  Rates current level of pain is a 5-7 out of 10, sharp in nature, exacerbated with any attempts of motion, weightbearing, or local pressure.  Moderate improvement with bedrest status and pain medication.  Denies radiation of pain into her knee, denies associated numbness or tingling left lower extremity.  Also noting some moderate pain over the lateral aspect the left ankle worse with pressure rates as a 3-4 out of 10 with associated swelling, denies radiation of this pain to the foot.    Allergies   Allergen Reactions   • Biaxin [Clarithromycin] Nausea And Vomiting   • Ceftin [Cefuroxime]      Uncertain if true allergy   • Celexa [Citalopram] Dizziness   • Chlorpheniramine Other (See Comments)     tachycardia   • Ciprofloxacin      Uncertain if true allergy   •  Crestor [Rosuvastatin] Other (See Comments)     Elevated LFTs   • Doxycycline Nausea And Vomiting   • Effexor [Venlafaxine] Nausea Only   • Fluoxetine Other (See Comments)     Abdominal pain   • Hydrocodone Bitartrate Er      insomnia   • Imipramine Hcl      Uncertain if true allergy   • Levaquin [Levofloxacin] Diarrhea   • Lexapro [Escitalopram Oxalate] Nausea Only   • Macrobid [Nitrofurantoin Monohyd Macro]      Uncertain if true allergy   • Methscopolamine      Uncertain if true allergy   • Phenylpropanolamine      Uncertain if true allergy   • Prednisone      Uncertain if true allergy   • Promethazine      Uncertain if true allergy   • Simvastatin      Uncertain if true allergy     • Verapamil      Uncertain if true allergy   • Welchol [Colesevelam Hcl]      Uncertain if true allergy   • Zetia [Ezetimibe]      Uncertain if true allergy     • Zoloft [Sertraline Hcl] Nausea Only   • Penicillins Rash       Medications:   Home Medications:  Medications Prior to Admission   Medication Sig Dispense Refill Last Dose   • aspirin 81 MG chewable tablet Chew 81 mg Daily.   2/28/2020 at 2/28   • BRINZOLAMIDE - BRIMONIDONE 1-0.2% OPTHALMIC SUSPENSION Administer 0.2 % to the right eye 2 (Two) Times a Day.   2/29/2020 at 0800   • clotrimazole-betamethasone (LOTRISONE) 1-0.05 % cream Apply 1 application topically 2 (Two) Times a Day As Needed (HEMMROIDS).   Past Month at Unknown time   • dicyclomine (BENTYL) 10 MG capsule Take 10 mg by mouth 4 (Four) Times a Day Before Meals & at Bedtime As Needed (ABDOMINAL PAIN).   Past Month at Unknown time   • diltiaZEM (CARDIZEM) 30 MG tablet Take 30 mg by mouth 2 (Two) Times a Day.   2/29/2020 at 0800   • dorzolamide (TRUSOPT) 2 % ophthalmic solution Administer 1 drop to the right eye 2 (Two) Times a Day.   2/29/2020 at 0800   • fluticasone (FLONASE) 50 MCG/ACT nasal spray 2 sprays into each nostril Daily.   2/29/2020 at 0800   • guaiFENesin (MUCINEX) 600 MG 12 hr tablet Take 600 mg by  mouth Every 12 (Twelve) Hours As Needed for Cough.   Past Month at Unknown time   • latanoprost (XALATAN) 0.005 % ophthalmic solution Administer 1 drop to the right eye Every Night.   2/29/2020 at 0800   • levocetirizine (XYZAL) 5 MG tablet Take 5 mg by mouth Every Evening.   2/29/2020 at 0800   • levothyroxine (SYNTHROID) 50 MCG tablet Take 50 mcg by mouth Daily.   2/29/2020 at 0800   • lisinopril (PRINIVIL,ZESTRIL) 20 MG tablet Take 1 tablet by mouth Daily. Take one half tablet daily for 1 week then advanced to one full tablet daily 30 tablet 0 2/29/2020 at 0800   • LORazepam (ATIVAN) 1 MG tablet Take 1 mg by mouth 3 (Three) Times a Day. Takes 1 in AM; 2 at HS.  Per daughter in law pt PMD told pt she can take an extra at 5pm   2/29/2020 at 0800   • pantoprazole (PROTONIX) 40 MG EC tablet Take 40 mg by mouth Daily.   2/29/2020 at 0800   • Probiotic Product (PROBIOTIC-10 PO) Take  by mouth.   2/29/2020 at 0800   • timolol (TIMOPTIC) 0.5 % ophthalmic solution Administer 1 drop to the right eye 2 (Two) Times a Day.   2/29/2020 at 0800   • benzonatate (TESSALON) 100 MG capsule Take 100 mg by mouth 3 (Three) Times a Day As Needed for Cough.   1/17/2020 at Unknown time   • Calcium Carbonate-Vitamin D (CALCIUM 500 + D PO) Take 1 tablet by mouth 2 (Two) Times a Day.   1/17/2020 at Unknown time   • hydrocortisone-pramoxine (PROCTOFOAM-HS) 1-1 % rectal foam Insert 1 applicator into the rectum 2 (Two) Times a Day. 10 g 0 More than a month at Unknown time   • meclizine (ANTIVERT) 12.5 MG tablet Take 12.5 mg by mouth 3 (Three) Times a Day As Needed for dizziness.   Unknown at Unknown time   • moxifloxacin (AVELOX) 400 MG tablet Take 1 tablet by mouth Daily. 5 tablet 0    • Multiple Vitamins-Minerals (CENTRUM SILVER PO) Take 1 tablet by mouth Daily.   1/17/2020 at Unknown time   • raNITIdine (ZANTAC) 150 MG tablet Take 150 mg by mouth 2 (Two) Times a Day.   1/17/2020 at Unknown time   • simethicone (MYLICON) 125 MG chewable  tablet Chew 125 mg Every 6 (Six) Hours As Needed for Flatulence.   12/10/2019 at unkn       Current Medications:  Scheduled Meds:  brimonidine 1 drop Right Eye BID   cetirizine 10 mg Oral Daily   dilTIAZem 30 mg Oral BID   dorzolamide 1 drop Right Eye BID   fluticasone 2 spray Nasal Daily   latanoprost 1 drop Right Eye Nightly   levothyroxine 50 mcg Oral Q AM   lisinopril 20 mg Oral Daily   LORazepam 1 mg Oral Daily   LORazepam 2 mg Oral Nightly   pantoprazole 40 mg Oral Q AM   sennosides-docusate 2 tablet Oral BID   sodium chloride 10 mL Intravenous Q12H   timolol 1 drop Right Eye Q12H     Continuous Infusions:  lactated ringers 100 mL/hr Last Rate: 100 mL/hr (03/01/20 1138)     PRN Meds:.•  acetaminophen  •  bisacodyl  •  clotrimazole-betamethasone  •  dicyclomine  •  guaiFENesin  •  HYDROmorphone **AND** naloxone  •  labetalol  •  melatonin  •  Morphine **AND** naloxone  •  sodium chloride  •  sodium chloride       Past Medical History:   Diagnosis Date   • Abdominal pain    • Anxiety    • Arthritis    • Atrial fibrillation (CMS/HCC)    • Bowel obstruction (CMS/HCC)    • C. difficile colitis    • Diarrhea    • GERD (gastroesophageal reflux disease)    • Hyperlipidemia    • Hypertension    • Hypothyroid    • Presbyesophagus    • Sinus congestion    • Thrush         Past Surgical History:   Procedure Laterality Date   • ABDOMINAL SURGERY     • APPENDECTOMY     • CHOLECYSTECTOMY     • ENDOSCOPY N/A 7/5/2017    Procedure: ESOPHAGOGASTRODUODENOSCOPY WITH DILATION;  Surgeon: Betito Montejo MD;  Location: [facility];  Service:    • HYSTERECTOMY     • KNEE ARTHROSCOPY Right    • THYROIDECTOMY          Social History     Occupational History   • Not on file   Tobacco Use   • Smoking status: Never Smoker   • Smokeless tobacco: Never Used   Substance and Sexual Activity   • Alcohol use: No   • Drug use: No   • Sexual activity: Defer    Social History     Social History Narrative   • Not on file        Family  History   Problem Relation Age of Onset   • Heart disease Father    • Cancer Sister    • Cancer Brother          Review of Systems:   HEENT: Patient denies any headaches, vision changes, change in hearing, or tinnitus, Patient denies any rhinorrhea,epistaxis, sinus pain, mouth or dental problems, sore throat or hoarseness, or dysphagia  Pulmonary: Patient denies any cough, congestion, SOA, or wheezing  Cardiovascular: Patient denies any chest pain, dyspnea, palpitations, weakness, intolerance of exercise, varicosities, swelling of extremities, known murmur  Gastrointestinal:  Patient denies nausea, vomiting, diarrhea, constipation, loss  of appetite, change in appetite, dysphagia, gas, heartburn, melena, change in bowel habits, use of laxatives or other drugs to alter the function of the gastrointestinal tract.  Genital/Urinary: Patient denies dysuria, change in color of urine, change in frequency of urination, pain with urgency, incontinence, retention, or nocturia.  Musculoskeletal: Patient denies increased warmth; redness; or swelling of joints; limitation of function; deformity; crepitation: notes pain in left hip and ankle as documented above in HPI.  Denies pain in low back or neck.    Neurological: Patient denies dizziness, tremor, ataxia, difficulty in speaking, change in speech, paresthesia, loss of sensation, seizures, syncope, changes in memory.  Endocrine system: Patient denies tremors, palpitations, intolerance of heat or cold, polyuria, polydipsia, polyphagia, diaphoresis, exophthalmos, or goiter.  Psychological: Patient denies thoughts/plans or harming self or other; depression,  insomnia, night terrors, romel, memory loss, disorientation.  Skin: Patient denies any bruising, rashes, discoloration, pruritus, wounds, ulcers, decubiti, changes in the hair or nails  Hematopoietic: Patient denies history of spontaneous or excessive bleeding, epistaxis, hematuria, melena, fatigue, enlarged or tender lymph  nodes, pallor, history of anemia.    Physical Exam: 89 y.o. female  Vitals:    03/01/20 0500 03/01/20 0550 03/01/20 0852 03/01/20 1153   BP: 151/67      BP Location: Right arm      Patient Position: Lying      Pulse: 90      Resp: 18      Temp: 97.5 °F (36.4 °C)      TempSrc: Oral      SpO2: (!) 85% 95% 94% 96%   Weight:       Height:         General Appearance:    Alert, cooperative, in no acute distress                      Head:    Normocephalic, without obvious abnormality, atraumatic   Eyes:            Lids and lashes normal, conjunctivae and sclerae normal, no   icterus, no pallor, corneas clear, PERRLA   Ears:    Ears appear intact with no abnormalities noted   Throat:   No oral lesions, no thrush, oral mucosa moist   Neck:   No adenopathy, supple, trachea midline, no thyromegaly, no   carotid bruit, no JVD   Back:     No kyphosis present, no scoliosis present, no skin lesions,      erythema or scars, no tenderness to percussion or                   palpation,   range of motion normal   Lungs:     Clear to auscultation,respirations regular, even and                  unlabored    Heart:    Regular rhythm and normal rate, normal S1 and S2, no            murmur, no gallop, no rub, no click   Chest Wall:    No abnormalities observed   Abdomen:     Normal bowel sounds, no masses, no organomegaly, soft        non-tender, non-distended, no guarding, no rebound                tenderness   Rectal:     Deferred   Extremities:   Tenderness noted at left hip and groin region exacerbated with any attempts even gentle logroll motion, mild tenderness over lateral aspect the left ankle with small region of ecchymosis posterior laterally with no pedrito crepitus or step-off appreciated.  Refuses to range left knee secondary to pain with no significant effusion or focal tenderness about the left knee.  Patient is able to flex and extend toes left foot with 4+ out of 5 strength, positive sensation light touch all distributions  left foot symmetric to the right.  Moves all remaining extremities well, no edema, no cyanosis, no redness   Pulses:   Pulses palpable and equal bilaterally   Skin:   No bleeding, bruising or rash   Lymph nodes:   No palpable adenopathy   Neurologic:   Cranial nerves 2 - 12 grossly intact, sensation intact, DTR       present and equal bilaterally           Diagnostic Tests:  Admission on 02/29/2020   Component Date Value Ref Range Status   • Glucose 02/29/2020 118* 65 - 99 mg/dL Final   • BUN 02/29/2020 19  8 - 23 mg/dL Final   • Creatinine 02/29/2020 0.80  0.57 - 1.00 mg/dL Final   • Sodium 02/29/2020 140  136 - 145 mmol/L Final   • Potassium 02/29/2020 3.6  3.5 - 5.2 mmol/L Final   • Chloride 02/29/2020 103  98 - 107 mmol/L Final   • CO2 02/29/2020 23.0  22.0 - 29.0 mmol/L Final   • Calcium 02/29/2020 9.5  8.6 - 10.5 mg/dL Final   • Total Protein 02/29/2020 7.2  6.0 - 8.5 g/dL Final   • Albumin 02/29/2020 4.10  3.50 - 5.20 g/dL Final   • ALT (SGPT) 02/29/2020 35* 1 - 33 U/L Final   • AST (SGOT) 02/29/2020 62* 1 - 32 U/L Final    Specimen hemolyzed.  Results may be affected.   • Alkaline Phosphatase 02/29/2020 81  39 - 117 U/L Final   • Total Bilirubin 02/29/2020 0.7  0.2 - 1.2 mg/dL Final   • eGFR Non African Amer 02/29/2020 68  >60 mL/min/1.73 Final   • Globulin 02/29/2020 3.1  gm/dL Final   • A/G Ratio 02/29/2020 1.3  g/dL Final   • BUN/Creatinine Ratio 02/29/2020 23.8  7.0 - 25.0 Final   • Anion Gap 02/29/2020 14.0  5.0 - 15.0 mmol/L Final   • WBC 02/29/2020 10.83* 3.40 - 10.80 10*3/mm3 Final   • RBC 02/29/2020 4.37  3.77 - 5.28 10*6/mm3 Final   • Hemoglobin 02/29/2020 13.5  12.0 - 15.9 g/dL Final   • Hematocrit 02/29/2020 40.9  34.0 - 46.6 % Final   • MCV 02/29/2020 93.6  79.0 - 97.0 fL Final   • MCH 02/29/2020 30.9  26.6 - 33.0 pg Final   • MCHC 02/29/2020 33.0  31.5 - 35.7 g/dL Final   • RDW 02/29/2020 12.3  12.3 - 15.4 % Final   • RDW-SD 02/29/2020 42.7  37.0 - 54.0 fl Final   • MPV 02/29/2020 11.4  6.0 -  12.0 fL Final   • Platelets 02/29/2020 175  140 - 450 10*3/mm3 Final   • Neutrophil % 02/29/2020 75.1  42.7 - 76.0 % Final   • Lymphocyte % 02/29/2020 18.9* 19.6 - 45.3 % Final   • Monocyte % 02/29/2020 4.9* 5.0 - 12.0 % Final   • Eosinophil % 02/29/2020 0.3  0.3 - 6.2 % Final   • Basophil % 02/29/2020 0.3  0.0 - 1.5 % Final   • Immature Grans % 02/29/2020 0.5  0.0 - 0.5 % Final   • Neutrophils, Absolute 02/29/2020 8.14* 1.70 - 7.00 10*3/mm3 Final   • Lymphocytes, Absolute 02/29/2020 2.05  0.70 - 3.10 10*3/mm3 Final   • Monocytes, Absolute 02/29/2020 0.53  0.10 - 0.90 10*3/mm3 Final   • Eosinophils, Absolute 02/29/2020 0.03  0.00 - 0.40 10*3/mm3 Final   • Basophils, Absolute 02/29/2020 0.03  0.00 - 0.20 10*3/mm3 Final   • Immature Grans, Absolute 02/29/2020 0.05  0.00 - 0.05 10*3/mm3 Final   • nRBC 02/29/2020 0.0  0.0 - 0.2 /100 WBC Final   • Creatine Kinase 02/29/2020 63  20 - 180 U/L Final   • Protime 03/01/2020 14.0  12.1 - 15.0 Seconds Final   • INR 03/01/2020 1.10  0.90 - 1.10 Final   • PTT 03/01/2020 32.6  24.3 - 38.1 seconds Final     Xr Ankle 3+ View Left    Result Date: 2/29/2020  Impression: Lateral soft tissue swelling. No fracture. Signer Name: Kris Quintana MD  Signed: 2/29/2020 8:37 PM  Workstation Name: [facility]  [facility]    Xr Chest 1 View    Result Date: 2/29/2020  Impression: No acute cardiopulmonary findings. Signer Name: Kris Quintana MD  Signed: 2/29/2020 9:44 PM  Workstation Name: [id number]  [facility]    Xr Hip With Or Without Pelvis 2 - 3 View Left    Result Date: 2/29/2020  Impression: Impacted fracture through the subcapital region of the left femoral neck. 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  Patient was explained details of procedure as well as risks, benefits, and alternatives with the risks including but not limited to neurovascular damage, bleeding, infection, chronic pain, periprosthetic fracture, dislocation, loss of motion, leg length discrepancy, weakness, DVT, pulmonary embolus, death, stroke, complex regional pain syndrome, myocardial infarction, need for additional procedures.  Patient understood all these had all questions answered prior to signing the operative consent form.  No guarantees were given in regards to results of the surgery.  We will plan on proceeding with surgery once patient is medically optimized.    Jeff Dickinson MD      Dictated utilizing Dragon dictation

## 2020-03-01 NOTE — PLAN OF CARE
Problem: Patient Care Overview  Goal: Plan of Care Review  3/1/2020 0254 by Yeni Martin, RN  Outcome: Ongoing (interventions implemented as appropriate)  Flowsheets (Taken 3/1/2020 0159)  Outcome Summary: Pt fell at home fx lt hip/femur; NPO hs; Probable surgery in the morning. Grandaughter bringing in eye drop medications for pt to use after pharmacy ok's them. Grandaughter at bedside. LR @100.  Note:   Keep O2 betwn 90-95%  3/1/2020 0159 by Yeni Martin, RN  Outcome: Ongoing (interventions implemented as appropriate)  Flowsheets (Taken 3/1/2020 0159)  Progress: improving  Outcome Summary: Pt fell at home fx lt hip/femur; NPO hs; Probable surgery in the morning. Grandaughter bringing in eye drop medications for pt to use after pharmacy ok's them. Grandaughter at bedside. LR @100.

## 2020-03-01 NOTE — NURSING NOTE
Contacted respiratory regarding the cap #E not showing up on the drop down box at the nurses station.

## 2020-03-01 NOTE — SIGNIFICANT NOTE
03/01/20 0900   Rehab Time/Intention   Evaluation Not Performed other (see comments)  (pt with hip fracture, awaiting surgery. will await ortho consult post surgery)

## 2020-03-01 NOTE — PLAN OF CARE
Continued Stay Note  PAULO Pillai     Patient Name: Paulo Hampton  MRN: [id number]  Today's Date: 3/1/2020    Admit Date: 2/29/2020    Discharge Plan       Row Name 03/01/20 1349       Plan    Plan  plan to be determined post surgery     Patient/Family in Agreement with Plan  yes    Plan Comments  Spoke with patients granddaughter, Suki Morrow, at bedside. RN staff at bedside repositioning patient & lab arrives to room. Suki state the patient lives alone in a home in [place]. She is typically independent of ADLs but does not drive. Family lives nearby to assist and provide transportation as needed. The patient uses a straight cane, rw and rollator - but ambulates unassisted when she does not feel dizzy. No additional DMe, home 02,cpap/bipap noted. She believes the patient has used HH in the past, but does not know the agency. She does not think the patient has used rehab services previously. There is a living will on file. The patient uses [facility] and family picks up medications without issue. Suki ellsworth the patient will need surgery and asks about [facility] or [facility]. Dr Dickinson enters room and states will plan for surgery tomorrow 3/2. CM will follow up for dc needs and placement preference after surgery.            Discharge Codes    No documentation.               Jorge Wolf RN

## 2020-03-01 NOTE — NURSING NOTE
Respiratory Therapy stated they didn't think the capnography was programed and wasn't going in the pt room to ensure pt was being monitored.

## 2020-03-02 ENCOUNTER — APPOINTMENT (OUTPATIENT)
Dept: GENERAL RADIOLOGY | Facility: HOSPITAL | Age: 85
End: 2020-03-02

## 2020-03-02 ENCOUNTER — ANESTHESIA EVENT (OUTPATIENT)
Dept: PERIOP | Facility: HOSPITAL | Age: 85
End: 2020-03-02

## 2020-03-02 ENCOUNTER — ANESTHESIA (OUTPATIENT)
Dept: PERIOP | Facility: HOSPITAL | Age: 85
End: 2020-03-02

## 2020-03-02 PROCEDURE — 25010000002 HYDROMORPHONE PER 4 MG: Performed by: NURSE ANESTHETIST, CERTIFIED REGISTERED

## 2020-03-02 PROCEDURE — 94770: CPT

## 2020-03-02 PROCEDURE — 25010000002 VANCOMYCIN 1 G RECONSTITUTED SOLUTION: Performed by: ORTHOPAEDIC SURGERY

## 2020-03-02 PROCEDURE — 25010000002 ONDANSETRON PER 1 MG: Performed by: ORTHOPAEDIC SURGERY

## 2020-03-02 PROCEDURE — 99233 SBSQ HOSP IP/OBS HIGH 50: CPT | Performed by: INTERNAL MEDICINE

## 2020-03-02 PROCEDURE — 25010000002 HYDROMORPHONE PER 4 MG: Performed by: INTERNAL MEDICINE

## 2020-03-02 PROCEDURE — 0SRS0J9 REPLACEMENT OF LEFT HIP JOINT, FEMORAL SURFACE WITH SYNTHETIC SUBSTITUTE, CEMENTED, OPEN APPROACH: ICD-10-PCS | Performed by: ORTHOPAEDIC SURGERY

## 2020-03-02 PROCEDURE — C9290 INJ, BUPIVACAINE LIPOSOME: HCPCS | Performed by: ORTHOPAEDIC SURGERY

## 2020-03-02 PROCEDURE — C1713 ANCHOR/SCREW BN/BN,TIS/BN: HCPCS | Performed by: ORTHOPAEDIC SURGERY

## 2020-03-02 PROCEDURE — 73501 X-RAY EXAM HIP UNI 1 VIEW: CPT

## 2020-03-02 PROCEDURE — 94799 UNLISTED PULMONARY SVC/PX: CPT

## 2020-03-02 PROCEDURE — C1776 JOINT DEVICE (IMPLANTABLE): HCPCS | Performed by: ORTHOPAEDIC SURGERY

## 2020-03-02 PROCEDURE — 25010000003 BUPIVACAINE LIPOSOME 1.3 % SUSPENSION 20 ML VIAL: Performed by: ORTHOPAEDIC SURGERY

## 2020-03-02 PROCEDURE — 27236 TREAT THIGH FRACTURE: CPT | Performed by: ORTHOPAEDIC SURGERY

## 2020-03-02 DEVICE — HD FEM/HIP VERSYS COCR 12/14TPR 28MM PLS3.5MM: Type: IMPLANTABLE DEVICE | Site: HIP | Status: FUNCTIONAL

## 2020-03-02 DEVICE — STEM FEM/HIP VERSYS ADVOCATE NONVLIGN STD/OFFST SZ11 120MM: Type: IMPLANTABLE DEVICE | Site: HIP | Status: FUNCTIONAL

## 2020-03-02 DEVICE — SUT FW #2 W/TPR NDL 1/2 CIR 38IN 97CM 26.5MM BLU: Type: IMPLANTABLE DEVICE | Site: HIP | Status: FUNCTIONAL

## 2020-03-02 DEVICE — BONE PREPARATION KIT
Type: IMPLANTABLE DEVICE | Site: HIP | Status: FUNCTIONAL
Brand: BIOPREP

## 2020-03-02 DEVICE — CENTRLZR DIST VERSYS 10MM: Type: IMPLANTABLE DEVICE | Site: HIP | Status: FUNCTIONAL

## 2020-03-02 DEVICE — LINER VERSYS ASMBL POLY 47/48/49MM OD X28MM: Type: IMPLANTABLE DEVICE | Site: HIP | Status: FUNCTIONAL

## 2020-03-02 DEVICE — SMARTSET GMV HIGH PERFORMANCE GENTAMICIN MEDIUM VISCOSITY BONE CEMENT 40G
Type: IMPLANTABLE DEVICE | Site: HIP | Status: FUNCTIONAL
Brand: SMARTSET

## 2020-03-02 DEVICE — CAP PRT HIP BIPOL: Type: IMPLANTABLE DEVICE | Site: HIP | Status: FUNCTIONAL

## 2020-03-02 DEVICE — SHLL VERSYS M/BIPOL MTL OD 47MM: Type: IMPLANTABLE DEVICE | Site: HIP | Status: FUNCTIONAL

## 2020-03-02 RX ORDER — ONDANSETRON 2 MG/ML
4 INJECTION INTRAMUSCULAR; INTRAVENOUS EVERY 6 HOURS PRN
Status: DISCONTINUED | OUTPATIENT
Start: 2020-03-02 | End: 2020-03-07 | Stop reason: HOSPADM

## 2020-03-02 RX ORDER — MEPERIDINE HYDROCHLORIDE 25 MG/ML
12.5 INJECTION INTRAMUSCULAR; INTRAVENOUS; SUBCUTANEOUS
Status: DISCONTINUED | OUTPATIENT
Start: 2020-03-02 | End: 2020-03-02 | Stop reason: HOSPADM

## 2020-03-02 RX ORDER — MAGNESIUM HYDROXIDE 1200 MG/15ML
LIQUID ORAL AS NEEDED
Status: DISCONTINUED | OUTPATIENT
Start: 2020-03-02 | End: 2020-03-02 | Stop reason: HOSPADM

## 2020-03-02 RX ORDER — BUPIVACAINE HYDROCHLORIDE 5 MG/ML
INJECTION, SOLUTION PERINEURAL
Status: COMPLETED | OUTPATIENT
Start: 2020-03-02 | End: 2020-03-02

## 2020-03-02 RX ORDER — MORPHINE SULFATE 10 MG/ML
6 INJECTION INTRAMUSCULAR; INTRAVENOUS; SUBCUTANEOUS
Status: DISCONTINUED | OUTPATIENT
Start: 2020-03-02 | End: 2020-03-03

## 2020-03-02 RX ORDER — OLANZAPINE 2.5 MG/1
10 TABLET ORAL NIGHTLY
Status: DISCONTINUED | OUTPATIENT
Start: 2020-03-02 | End: 2020-03-04

## 2020-03-02 RX ORDER — HYDROMORPHONE HCL 110MG/55ML
PATIENT CONTROLLED ANALGESIA SYRINGE INTRAVENOUS AS NEEDED
Status: DISCONTINUED | OUTPATIENT
Start: 2020-03-02 | End: 2020-03-02 | Stop reason: SURG

## 2020-03-02 RX ORDER — TRANEXAMIC ACID 100 MG/ML
INJECTION, SOLUTION INTRAVENOUS AS NEEDED
Status: DISCONTINUED | OUTPATIENT
Start: 2020-03-02 | End: 2020-03-02 | Stop reason: SURG

## 2020-03-02 RX ORDER — VANCOMYCIN HYDROCHLORIDE 1 G/20ML
INJECTION, POWDER, LYOPHILIZED, FOR SOLUTION INTRAVENOUS AS NEEDED
Status: DISCONTINUED | OUTPATIENT
Start: 2020-03-02 | End: 2020-03-02 | Stop reason: HOSPADM

## 2020-03-02 RX ORDER — SODIUM CHLORIDE 9 MG/ML
INJECTION, SOLUTION INTRAVENOUS AS NEEDED
Status: DISCONTINUED | OUTPATIENT
Start: 2020-03-02 | End: 2020-03-02 | Stop reason: HOSPADM

## 2020-03-02 RX ORDER — HYDROCODONE BITARTRATE AND ACETAMINOPHEN 7.5; 325 MG/1; MG/1
2 TABLET ORAL EVERY 4 HOURS PRN
Status: DISCONTINUED | OUTPATIENT
Start: 2020-03-02 | End: 2020-03-04

## 2020-03-02 RX ORDER — HYDROCODONE BITARTRATE AND ACETAMINOPHEN 7.5; 325 MG/1; MG/1
1 TABLET ORAL EVERY 4 HOURS PRN
Status: DISCONTINUED | OUTPATIENT
Start: 2020-03-02 | End: 2020-03-06

## 2020-03-02 RX ORDER — SODIUM CHLORIDE, SODIUM LACTATE, POTASSIUM CHLORIDE, CALCIUM CHLORIDE 600; 310; 30; 20 MG/100ML; MG/100ML; MG/100ML; MG/100ML
100 INJECTION, SOLUTION INTRAVENOUS CONTINUOUS
Status: DISCONTINUED | OUTPATIENT
Start: 2020-03-02 | End: 2020-03-02

## 2020-03-02 RX ORDER — NALOXONE HCL 0.4 MG/ML
0.4 VIAL (ML) INJECTION
Status: DISCONTINUED | OUTPATIENT
Start: 2020-03-02 | End: 2020-03-03

## 2020-03-02 RX ORDER — HALOPERIDOL 5 MG/ML
2 INJECTION INTRAMUSCULAR
Status: DISCONTINUED | OUTPATIENT
Start: 2020-03-02 | End: 2020-03-06

## 2020-03-02 RX ORDER — ONDANSETRON 2 MG/ML
4 INJECTION INTRAMUSCULAR; INTRAVENOUS ONCE AS NEEDED
Status: DISCONTINUED | OUTPATIENT
Start: 2020-03-02 | End: 2020-03-02 | Stop reason: HOSPADM

## 2020-03-02 RX ORDER — ONDANSETRON 4 MG/1
4 TABLET, FILM COATED ORAL EVERY 6 HOURS PRN
Status: DISCONTINUED | OUTPATIENT
Start: 2020-03-02 | End: 2020-03-07 | Stop reason: HOSPADM

## 2020-03-02 RX ADMIN — LORAZEPAM 2 MG: 1 TABLET ORAL at 21:57

## 2020-03-02 RX ADMIN — TRANEXAMIC ACID 1000 MG: 100 INJECTION, SOLUTION INTRAVENOUS at 17:13

## 2020-03-02 RX ADMIN — HYDROCODONE BITARTRATE AND ACETAMINOPHEN 1 TABLET: 7.5; 325 TABLET ORAL at 21:57

## 2020-03-02 RX ADMIN — TIMOLOL MALEATE 1 DROP: 5 SOLUTION/ DROPS OPHTHALMIC at 21:57

## 2020-03-02 RX ADMIN — SODIUM CHLORIDE, PRESERVATIVE FREE 10 ML: 5 INJECTION INTRAVENOUS at 08:17

## 2020-03-02 RX ADMIN — LORAZEPAM 1 MG: 1 TABLET ORAL at 08:12

## 2020-03-02 RX ADMIN — DORZOLAMIDE HCL 1 DROP: 20 SOLUTION/ DROPS OPHTHALMIC at 08:14

## 2020-03-02 RX ADMIN — SODIUM CHLORIDE, POTASSIUM CHLORIDE, SODIUM LACTATE AND CALCIUM CHLORIDE 100 ML/HR: 600; 310; 30; 20 INJECTION, SOLUTION INTRAVENOUS at 08:13

## 2020-03-02 RX ADMIN — TRANEXAMIC ACID 1000 MG: 100 INJECTION, SOLUTION INTRAVENOUS at 15:48

## 2020-03-02 RX ADMIN — LABETALOL HYDROCHLORIDE 10 MG: 5 INJECTION INTRAVENOUS at 12:30

## 2020-03-02 RX ADMIN — HYDROMORPHONE HYDROCHLORIDE 0.5 MG: 2 INJECTION, SOLUTION INTRAMUSCULAR; INTRAVENOUS; SUBCUTANEOUS at 17:27

## 2020-03-02 RX ADMIN — LISINOPRIL 20 MG: 20 TABLET ORAL at 08:13

## 2020-03-02 RX ADMIN — BRIMONIDINE TARTRATE 1 DROP: 2 SOLUTION/ DROPS OPHTHALMIC at 08:16

## 2020-03-02 RX ADMIN — OLANZAPINE 10 MG: 2.5 TABLET, FILM COATED ORAL at 21:56

## 2020-03-02 RX ADMIN — MELATONIN TAB 5 MG 10 MG: 5 TAB at 21:56

## 2020-03-02 RX ADMIN — HYDROMORPHONE HYDROCHLORIDE 1 MG: 1 INJECTION, SOLUTION INTRAMUSCULAR; INTRAVENOUS; SUBCUTANEOUS at 02:44

## 2020-03-02 RX ADMIN — SENNOSIDES AND DOCUSATE SODIUM 2 TABLET: 8.6; 5 TABLET ORAL at 08:13

## 2020-03-02 RX ADMIN — ONDANSETRON 4 MG: 2 INJECTION, SOLUTION INTRAMUSCULAR; INTRAVENOUS at 21:56

## 2020-03-02 RX ADMIN — TIMOLOL MALEATE 1 DROP: 5 SOLUTION/ DROPS OPHTHALMIC at 08:17

## 2020-03-02 RX ADMIN — BRIMONIDINE TARTRATE 1 DROP: 2 SOLUTION/ DROPS OPHTHALMIC at 21:57

## 2020-03-02 RX ADMIN — SODIUM CHLORIDE, POTASSIUM CHLORIDE, SODIUM LACTATE AND CALCIUM CHLORIDE: 600; 310; 30; 20 INJECTION, SOLUTION INTRAVENOUS at 14:15

## 2020-03-02 RX ADMIN — BUPIVACAINE HYDROCHLORIDE 2.8 ML: 5 INJECTION, SOLUTION PERINEURAL at 15:18

## 2020-03-02 RX ADMIN — LATANOPROST 1 DROP: 50 SOLUTION OPHTHALMIC at 21:57

## 2020-03-02 RX ADMIN — DILTIAZEM HYDROCHLORIDE 30 MG: 30 TABLET, FILM COATED ORAL at 21:56

## 2020-03-02 RX ADMIN — VANCOMYCIN HYDROCHLORIDE 1000 MG: 1 INJECTION, POWDER, LYOPHILIZED, FOR SOLUTION INTRAVENOUS at 13:52

## 2020-03-02 RX ADMIN — DILTIAZEM HYDROCHLORIDE 30 MG: 30 TABLET, FILM COATED ORAL at 08:11

## 2020-03-02 RX ADMIN — TRAMADOL HYDROCHLORIDE 100 MG: 50 TABLET, FILM COATED ORAL at 08:12

## 2020-03-02 RX ADMIN — CETIRIZINE HYDROCHLORIDE 10 MG: 10 TABLET, FILM COATED ORAL at 08:13

## 2020-03-02 RX ADMIN — SODIUM CHLORIDE, PRESERVATIVE FREE 10 ML: 5 INJECTION INTRAVENOUS at 21:57

## 2020-03-02 RX ADMIN — LABETALOL HYDROCHLORIDE 10 MG: 5 INJECTION INTRAVENOUS at 05:01

## 2020-03-02 RX ADMIN — SENNOSIDES AND DOCUSATE SODIUM 2 TABLET: 8.6; 5 TABLET ORAL at 21:56

## 2020-03-02 RX ADMIN — DORZOLAMIDE HCL 1 DROP: 20 SOLUTION/ DROPS OPHTHALMIC at 21:57

## 2020-03-02 RX ADMIN — FLUTICASONE PROPIONATE 2 SPRAY: 50 SPRAY, METERED NASAL at 08:16

## 2020-03-02 NOTE — PROGRESS NOTES
"SERVICE: [facility] HOSPITALIST    CHIEF COMPLAINT: Hypothyroidism    SUBJECTIVE:     And examined on rounds early afternoon    Patient's pain is very well controlled, and patient is slightly somnolent.  Family expresses they feel patient was on too high of a dose of opioids, and stated that Dr. Dickinson had reduced her doses due to oversedation,, and severe nausea.  Discussed that patient already on Zofran every 8, could change to every 6, but other medications that could be used for nausea would increase sedation.    She had a recent admission for SBO, but family states that patient's nausea and abdominal symptoms were nonexistent after she was discharged.  And did not recur until patient was administered opioids on this admission.    Family very concerned about patient's hospital associated metabolic encephalopathy and agitation with behavioral disturbance that patient has exhibited on prior admissions.  This appeared to be related to patient's home Ativan being held.  Review of records indicates that patient does not appear to have paradoxical reaction to Ativan while in the hospital.  Though discussed the danger of using Ativan to treat metabolic encephalopathy and sundowning with family, and that she may need other medications to control her agitation.    Review of systems is negative for abdominal pain, diarrhea, constipation, abdominal bloating, pain, shortness of breath.    OBJECTIVE:    /69 (BP Location: Left arm, Patient Position: Lying)   Pulse 78   Temp 97.3 °F (36.3 °C) (Oral)   Resp 16   Ht 157.5 cm (62\")   Wt 61.6 kg (135 lb 12.8 oz)   SpO2 91%   BMI 24.84 kg/m²     MEDS/LABS REVIEWED AND ORDERED      brimonidine 1 drop Right Eye BID   cetirizine 10 mg Oral Daily   dilTIAZem 30 mg Oral BID   dorzolamide 1 drop Right Eye BID   fluticasone 2 spray Nasal Daily   latanoprost 1 drop Right Eye Nightly   levothyroxine 50 mcg Oral Q AM   lisinopril 20 mg Oral Daily   LORazepam 1 " mg Oral Daily   LORazepam 2 mg Oral Nightly   pantoprazole 40 mg Oral Q AM   sennosides-docusate 2 tablet Oral BID   sodium chloride 10 mL Intravenous Q12H   timolol 1 drop Right Eye Q12H       Physical Exam   Constitutional: No distress.   HENT:   Head: Normocephalic and atraumatic.   Right Ear: External ear normal.   Left Ear: External ear normal.   Nose: Nose normal.   Eyes: Pupils are equal, round, and reactive to light. Conjunctivae and EOM are normal.   Cardiovascular: Normal rate, regular rhythm and normal heart sounds. Exam reveals no friction rub.   No murmur heard.  Pulmonary/Chest: Effort normal and breath sounds normal. No stridor. No respiratory distress. She has no wheezes. She has no rales.   Abdominal: Soft. Bowel sounds are normal. She exhibits no distension. There is no tenderness. There is no guarding.   Musculoskeletal: She exhibits no edema.   Neurological: No cranial nerve deficit.   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 She is not diaphoretic. No erythema.   Psychiatric: She has a normal mood and affect. Her behavior is normal.   Nursing note and vitals reviewed.      LAB/DIAGNOSTICS:    Lab Results (last 24 hours)     Procedure Component Value Units Date/Time    Protime-INR [[id number]]  (Normal) Collected:  03/01/20 1102    Specimen:  Blood Updated:  03/01/20 1124     Protime 14.0 Seconds      INR 1.10    Narrative:       Therapeutic Ranges for INR: 2.0-3.0 (PT 20-30)                              2.5-3.5 (PT 25-34)    aPTT [[id number]]  (Normal) Collected:  03/01/20 1102    Specimen:  Blood Updated:  03/01/20 1124     PTT 32.6 seconds     Narrative:       PTT = The equivalent PTT values for the therapeutic range of heparin levels at 0.1 to 0.7 U/ml are 53 to 110 seconds.      CK [[id number]]  (Normal) Collected:  02/29/20 2112    Specimen:   Blood Updated:  02/29/20 2226     Creatine Kinase 63 U/L     Comprehensive Metabolic Panel [[id number]]  (Abnormal) Collected:  02/29/20 2112    Specimen:  Blood Updated:  02/29/20 2134     Glucose 118 mg/dL      BUN 19 mg/dL      Creatinine 0.80 mg/dL      Sodium 140 mmol/L      Potassium 3.6 mmol/L      Chloride 103 mmol/L      CO2 23.0 mmol/L      Calcium 9.5 mg/dL      Total Protein 7.2 g/dL      Albumin 4.10 g/dL      ALT (SGPT) 35 U/L      AST (SGOT) 62 U/L      Comment: Specimen hemolyzed.  Results may be affected.        Alkaline Phosphatase 81 U/L      Total Bilirubin 0.7 mg/dL      eGFR Non African Amer 68 mL/min/1.73      Globulin 3.1 gm/dL      A/G Ratio 1.3 g/dL      BUN/Creatinine Ratio 23.8     Anion Gap 14.0 mmol/L     Narrative:       GFR Normal >60  Chronic Kidney Disease <60  Kidney Failure <15      CBC & Differential [[id number]] Collected:  02/29/20 2112    Specimen:  Blood from Arm, Right Updated:  02/29/20 2118    Narrative:       The following orders were created for panel order CBC & Differential.  Procedure                               Abnormality         Status                     ---------                               -----------         ------                     CBC Auto Differential[[id number]]        Abnormal            Final result                 Please view results for these tests on the individual orders.    CBC Auto Differential [[id number]]  (Abnormal) Collected:  02/29/20 2112    Specimen:  Blood from Arm, Right Updated:  02/29/20 2118     WBC 10.83 10*3/mm3      RBC 4.37 10*6/mm3      Hemoglobin 13.5 g/dL      Hematocrit 40.9 %      MCV 93.6 fL      MCH 30.9 pg      MCHC 33.0 g/dL      RDW 12.3 %      RDW-SD 42.7 fl      MPV 11.4 fL      Platelets 175 10*3/mm3      Neutrophil % 75.1 %      Lymphocyte % 18.9 %      Monocyte % 4.9 %      Eosinophil % 0.3 %      Basophil % 0.3 %      Immature Grans % 0.5 %      Neutrophils, Absolute 8.14 10*3/mm3      Lymphocytes, Absolute 2.05  10*3/mm3      Monocytes, Absolute 0.53 10*3/mm3      Eosinophils, Absolute 0.03 10*3/mm3      Basophils, Absolute 0.03 10*3/mm3      Immature Grans, Absolute 0.05 10*3/mm3      nRBC 0.0 /100 WBC            Xr Ankle 3+ View Left    Result Date: 2/29/2020  Lateral soft tissue swelling. No fracture. Signer Name: Kris Quintana MD  Signed: 2/29/2020 8:37 PM  Workstation Name: [facility]  [facility]    Xr Chest 1 View    Result Date: 2/29/2020  No acute cardiopulmonary findings. Signer Name: Kris Quintana MD  Signed: 2/29/2020 9:44 PM  Workstation Name: [facility]  [facility]    Xr Hip With Or Without Pelvis 2 - 3 View Left    Result Date: 2/29/2020  Impacted fracture through the subcapital region of the left femoral neck. Signer Name: Kris Quintana MD  Signed: 2/29/2020 8:38 PM  Workstation Name: [facility]  [facility]        ASSESSMENT/PLAN:    Postmenopausal 89WF with hypothyroidism and hypertension who presents for closed L hip fragility fracture following a mechanical fall from ground height. She attempted to ambulate without assistance of a cane or walker. Plan for ORIF tomorrow     #Closed left hip fragility fracture, POA, reason for admission  #Left ankle sprain, clinically diagnosed. POA.   #Osteoporosis, new diagnosis based on fragility fracture of l hip from standing height, POA, needs follow up and work up by PCP  #Periperative risk stratification  #Gait instability requiring ambulatory assistance  #Essential Hypertension, POA.   #Hypothyroidism     - NPO at midnight, hold DVT ppx  - Orthopedic surgery consultation  - Will need to follow up with PCP regarding initiation of Bisphosphonate therapy  - Continue home antihypertensives. SBP's > 200's after Labetolol wears off, and with what appears to be adequate pain control, will schedule IV for now, and add prn hydralazine, Will monitor post op  - Continue home medications     - Continue  home Ativan to avoid withdrawal. Cautious use of opiates in this setting.  - Maintain SpO2 >90% with supplemental O2. And capnography   - Will need to follow up with PT/OT post-operatively.     - Have scheduled melatonin, and have made prn ativan and haldol available for metabolic colopathy with behavioral disturbance seen on other admissions       - Braydon perioperative risk of MI or cardiac arrest intraoperatively or upt o 30 days post op: 0.5%  - Braydon post-op respiratory failure (risk of mechanical vent >48 hours postop or reintubation at 30 days): 0.6%.   -  ZOHAIB's is Low risk, feel slightly underestimates risk, but doubt it is more than moderate, No further work up required, EKG same as prior     - Has a living will stating that she is DNR Full Treatment. She and her family are agreeable to reversing code status for the purpose of surgery.

## 2020-03-02 NOTE — PLAN OF CARE
Problem: Patient Care Overview  Goal: Plan of Care Review  Outcome: Ongoing (interventions implemented as appropriate)  Flowsheets (Taken 3/2/2020 0330)  Progress: improving  Plan of Care Reviewed With: patient  Outcome Summary: Patient pain resolved with PO ultram, increased confusion tonight and crying out, being hostile with family member, IV medicaiton administered.  per cont pulse ox patient oxygen level increased to 3Lnc while asleep  Goal: Individualization and Mutuality  Outcome: Ongoing (interventions implemented as appropriate)  Flowsheets (Taken 3/2/2020 0330)  Patient Specific Preferences: increased confusion at night, prepped for surgery  Goal: Discharge Needs Assessment  Outcome: Ongoing (interventions implemented as appropriate)  Goal: Interprofessional Rounds/Family Conf  Outcome: Ongoing (interventions implemented as appropriate)     Problem: Fractured Hip (Adult)  Goal: Signs and Symptoms of Listed Potential Problems Will be Absent, Minimized or Managed (Fractured Hip)  Outcome: Ongoing (interventions implemented as appropriate)  Flowsheets (Taken 3/2/2020 0330)  Problems Assessed (Fractured Hip): all  Problems Present (Fractured Hip): acute cognitive dysfunction; functional deficit/self-care deficit; pain     Problem: Fall Risk (Adult)  Goal: Identify Related Risk Factors and Signs and Symptoms  Outcome: Ongoing (interventions implemented as appropriate)  Flowsheets (Taken 3/2/2020 0330)  Related Risk Factors (Fall Risk): confusion/agitation; gait/mobility problems; sleep pattern alteration; environment unfamiliar  Goal: Absence of Fall  Outcome: Ongoing (interventions implemented as appropriate)  Flowsheets (Taken 3/2/2020 0330)  Absence of Fall: making progress toward outcome

## 2020-03-02 NOTE — NURSING NOTE
Continued Stay Note  PAULO Pillai     Patient Name: Paulo Hampton  MRN: [id number]  Today's Date: 3/2/2020    Admit Date: 2/29/2020    Discharge Plan     Row Name 03/02/20 1318       Plan    Plan  SNF [place]    Plan Comments  Spoke with patients Granddaughter who is requesting pt go to [facility] upon d/c.  Granddaughter,Joslyn is going to speak with the rest of the family to discuss d/c plans.  Family has together decided on [facility] placement after surgery.  Phone call placed to [facility] for referral.  CM will continue to follow         Discharge Codes    No documentation.             Mario Zamora RN

## 2020-03-02 NOTE — ANESTHESIA PREPROCEDURE EVALUATION
Anesthesia Evaluation     Patient summary reviewed and Nursing notes reviewed   history of anesthetic complications: PONV  NPO Solid Status: > 8 hours  NPO Liquid Status: > 8 hours           Airway   Mallampati: II  TM distance: >3 FB  Neck ROM: full  No difficulty expected  Dental      Pulmonary - negative pulmonary ROS   (+) decreased breath sounds,     ROS comment: Study Result     CR Chest 1 Vw 2/29/2020     INDICATION:   Left hip fracture status post fall today. Preop ORIF. History of atrial fibrillation and benign essential hypertension.      COMPARISON:    7/9/2017     FINDINGS:  Single portable AP view(s) of the chest.     No visible support lines.     The heart and mediastinal contours are normal. The lungs are clear. No pneumothorax or pleural effusion.      IMPRESSION:  No acute cardiopulmonary findings.     Signer Name: Kris Quintana MD   Signed: 2/29/2020 9:44 PM   Workstation Name: [email or web address]    [facility]      Cardiovascular   Exercise tolerance: poor (<4 METS)    ECG reviewed  PT is on anticoagulation therapy  Rhythm: regular  Rate: normal    (+) hypertension, dysrhythmias (hx of) Atrial Fib, hyperlipidemia,       Neuro/Psych  (+) headaches, dizziness/light headedness, psychiatric history Anxiety, poor historian.,     GI/Hepatic/Renal/Endo    (+)  GERD well controlled,  renal disease (cyst on left kidney ), thyroid problem (goiter removed ) hypothyroidism    ROS Comment: In hospital last month for   1.  Acute Diverticulitis  2.  Small Bowel Obstruction     Musculoskeletal     (+) gait problem (walker use prior to hip fx),       ROS comment: Closed fracture of left hip (CMS/HCC)  Abdominal   (+) obese,    Substance History - negative use     OB/GYN          Other   arthritis,      ROS/Med Hx Other: Presbyesophagus                  Anesthesia Plan    ASA 3     MAC and spinal     intravenous induction     Anesthetic plan, all risks, benefits, and alternatives have been  provided, discussed and informed consent has been obtained with: patient.  Use of blood products discussed with patient  Consented to blood products.

## 2020-03-02 NOTE — ANESTHESIA POSTPROCEDURE EVALUATION
Patient: Paulo Hampton    Procedure Summary     Date:  03/02/20 Room / Location:   [facility] OR 3 /  [facility]    Anesthesia Start:  1531 Anesthesia Stop:  1744    Procedure:  HIP HEMIARTHROPLASTY and all associated procedures (Left Hip) Diagnosis:       Closed fracture of left hip, initial encounter (CMS/[facility])      (Closed fracture of left hip, initial encounter (CMS/[facility]) [S72.002A])    Surgeon:  Jeff Dickinson MD Provider:  Jay Delacruz CRNA    Anesthesia Type:  MAC, spinal ASA Status:  3          Anesthesia Type: MAC, spinal    Vitals  Vitals Value Taken Time   /55 3/2/2020  6:25 PM   Temp 98.1 °F (36.7 °C) 3/2/2020  5:37 PM   Pulse 80 3/2/2020  6:28 PM   Resp 20 3/2/2020  6:25 PM   SpO2 93 % 3/2/2020  6:29 PM   Vitals shown include unvalidated device data.        Post Anesthesia Care and Evaluation    Patient location during evaluation: PHASE II  Patient participation: complete - patient participated  Level of consciousness: awake and alert  Pain score: 1  Pain management: adequate  Airway patency: patent  Anesthetic complications: No anesthetic complications  PONV Status: none  Cardiovascular status: acceptable  Respiratory status: acceptable  Hydration status: acceptable  Post Neuraxial Block status: Motor and sensory function returned to baseline and No signs or symptoms of PDPH

## 2020-03-02 NOTE — OP NOTE
Date of Operation: 3/2/2020    PREOPERATIVE DIAGNOSIS: Left hip femoral neck fracture.     POSTOPERATIVE DIAGNOSIS: Left hip femoral neck fracture.     PROCEDURE PERFORMED: Left hip bipolar hemiarthroplasty for treatment of femoral neck fracture.     SURGEON: Jeff Dickinson MD     ASSISTANT: Garry    ANESTHESIA: Spinal anesthesia     ESTIMATED BLOOD LOSS:175 mL     URINE OUTPUT: Not recorded.     FLUIDS: Per Anesthesia.     COMPLICATION: None.     SPECIMEN:none    DRAIN: None.     IMPLANT: Craig VerSys bipolar femoral system with a size 11 femoral stem, 47 mm outer diameter shell with 28 mm inner liner, +3.5 mm neck length, antibiotic cement.     INDICATIONS: The patient is a pleasant 89 y.o. who has been evaluated for left hip pain, noted to have femoral neck fracture on preoperative imaging. I discussed treatment options with the patient and family. Wished to proceed with the above-mentioned surgery. Reviewed risks, benefits, and alternatives of surgery as documented on preoperative history and physical. Patient and family expressed understanding of these and had all questions answered prior to signing operative consent. No guarantees were given in regards to the surgery.     DESCRIPTION OF PROCEDURE: The patient was seen, evaluated, and cleared for surgery by Anesthesia, met in the preoperative holding area. The operative site was marked, consent was reviewed, History and Physical was updated, and preoperative labs were reviewed. A regional block was then placed per Anesthesia. The patient was then taken to the operating room and placed in the supine position on a regular OR table. After successful intubation per Anesthesia, the patient was turned in the lateral decubitus position and positioned with the universal hip positioner. An axillary roll was placed. The patient was secured to the table by a strap. All bony prominences were well-padded. The left lower extremity was then sterilely prepped and draped in  a standard fashion.   Formal timeout was completed, including confirmation of History and Physical, operative consent, surgical site, patient identification number, and preoperative antibiotic administration. The procedure was begun with the a 10 cm curvilinear incision over the left hip through skin and subcutaneous tissue with a #15 blade. The incision was then carried down to the level of the IT band which was opened in a longitudinal fashion in line with the femur and curving posterior proximally at the posterosuperior edge of the greater trochanter. A Charnley retractor was placed at this point in time. Trochanteric bursa was excised. Sciatic nerve was identified and protected throughout the remainder of the case. Piriformis tendon was then released followed by short external rotators consistent with a standard posterior approach to the hip. Capsule was then opened in a T-shaped fashion with the leaflets of the capsule being tagged with #2 FiberWire. The hip was then maximally internally rotated and flexed identifying the fracture site at this point in time. A revised femoral neck cut was made at this point in time with templating from a flag, 1 fingerbreadth above the lesser trochanter. The neck osteotomy was completed laterally with the use of an osteotome protecting the greater trochanter. Once this was completed the bone hook was used to pull the proximal femur out of the way while the femoral head was captured with a corkscrew and extracted from the acetabulum. Ligamentum teres was resected at this point in time. Acetabular cartilage was evaluated with no significant full-thickness focal defect noted. Femoral head was sized as a 47 at this point in time. A 47 trial was placed in the acetabulum with good suction fit noted. Labrum was noted to be carefully preserved.   Acetabulum was then thoroughly irrigated with normal saline at this point in time, as well as proximal femur.  The proximal femur was then  internally rotated and flexed with preparation of the femoral side with a box osteotome, followed by a canal entering reamer, followed by a lateralizing reamer. Broaching was then accomplished starting with a size 9 proceeding up to a size 11 with good fit noted proximally. Trial reduction was carried out at this point in time with a +3.5 neck noted to be appropriate length, assessed for stability on reduction as well as position of femoral head in relation to tip of trochanter, and these were noted to be appropriate.   Trial components were removed at this point in time. The femoral canal was thoroughly irrigated with normal saline and the final components were opened on the back table except for the head and neck portions. The distal cement plug was placed as well as the centralizer on the distal end of the stem. Antibiotic cement was prepared on the back table and inserted with third-generation cement technique under pressurization into the femoral canal. Femoral stem was then placed with appropriate anteversion to match the patient's native anteversion with extraneous cement being removed with freers. Once cement had appropriately cured, a 2nd trial reduction was carried out at this point in time with a +3.5 neck and leg lengths were noted to be grossly symmetric at this point in time with good stability to the hip to 90° of flexion and 90° of internal rotation. No evidence of anterior or posterior impingement through a full range of motion. Negative quadriceps reflex test.   The femur was once again dislocated at this point in time. The trial head and neck components were removed. Acetabulum and stem were then thoroughly irrigated with normal saline. The bipolar components were assembled on the back table and placed onto the femoral stem and impacted, engaging the Niño taper at this time. The hip was then once again reduced and stability noted to be appropriate. Capsulotomy was closed with #2 FiberWire.  Piriformis was repaired to the gluteus medius insertion with #2 FiberWire. The wound was then thoroughly irrigated additionally in the deep layer at this point in time, with a betadine lavage, followed by irrigation with normal saline.   Charnley retractor was then removed and IT band was closed with strata fix in a running fashion followed by closure of the subcutaneous layer with 2-0 Vicryl and skin closure 3-0 strata fix, prineo mesh, and glue.  The wound was dressed with Telfa, 4 x 4's, ABD pad, and Medipore tape. The patient was placed in an abduction pillow.   At the end of the procedure all lap, needle, and sponge, and instrument counts were correct x2. The patient had brisk capillary refill to all digits of the left lower extremity. Compartments were soft and easily compressible at the end of the procedure.   DISPOSITION: The patient was extubated per Anesthesia and taken to the recovery room in stable condition. Will be admitted to the Hospitalist service postoperatively. Weight-bear as tolerated to left lower extremity and posterior hip precautions for 6 weeks. Results of the procedure were discussed immediately postoperatively with the patient's family and they had all questions answered at that point in time.

## 2020-03-02 NOTE — ANESTHESIA PROCEDURE NOTES
Spinal Block      Patient reassessed immediately prior to procedure    Patient location during procedure: pre-op  Start Time: 3/2/2020 3:15 PM  Stop Time: 3/2/2020 3:20 PM  Preanesthetic Checklist  Completed: patient identified, site marked, surgical consent, pre-op evaluation, timeout performed, IV checked, risks and benefits discussed and monitors and equipment checked  Spinal Block Prep:  Patient Position:right lateral decubitus  Sterile Tech:cap, gloves, mask and sterile barriers  Prep:Betadine  Patient Monitoring:blood pressure monitoring, continuous pulse oximetry and EKG  Spinal Block Procedure  Approach:midline  Guidance:palpation technique  Location:L3-L4  Needle Type:Sprotte  Needle Gauge:24 G  Placement of Spinal needle event:cerebrospinal fluid aspirated  Paresthesia: no  Fluid Appearance:clear  Medications: bupivacaine (MARCAINE) injection 0.5%, 2.8 mL  Med Administered at 3/2/2020 3:18 PM   Post Assessment  Patient Tolerance:patient tolerated the procedure well with no apparent complications  Complications no

## 2020-03-02 NOTE — PLAN OF CARE
Problem: Patient Care Overview  Goal: Plan of Care Review  Outcome: Ongoing (interventions implemented as appropriate)  Flowsheets (Taken 3/1/2020 1942)  Progress: improving  Plan of Care Reviewed With: patient  Outcome Summary: Left hip hemiarthroplasty planned tomorrow. Pt reports 5-7/10 pain. Good relief with Ultram if not moved - more painful toileting on bedpan. 1-1.5 L nasal cannula O2 for sats 90-95% - desaturates when she sleeps. PRN labetolol for high BP x 1 during shift.

## 2020-03-03 LAB
ANION GAP SERPL CALCULATED.3IONS-SCNC: 10.6 MMOL/L (ref 5–15)
BUN BLD-MCNC: 12 MG/DL (ref 8–23)
BUN/CREAT SERPL: 21.1 (ref 7–25)
CALCIUM SPEC-SCNC: 8.4 MG/DL (ref 8.6–10.5)
CHLORIDE SERPL-SCNC: 100 MMOL/L (ref 98–107)
CO2 SERPL-SCNC: 27.4 MMOL/L (ref 22–29)
CREAT BLD-MCNC: 0.57 MG/DL (ref 0.57–1)
GFR SERPL CREATININE-BSD FRML MDRD: 100 ML/MIN/1.73
GLUCOSE BLD-MCNC: 112 MG/DL (ref 65–99)
HCT VFR BLD AUTO: 31.2 % (ref 34–46.6)
HGB BLD-MCNC: 10.5 G/DL (ref 12–15.9)
POTASSIUM BLD-SCNC: 3.4 MMOL/L (ref 3.5–5.2)
SODIUM BLD-SCNC: 138 MMOL/L (ref 136–145)

## 2020-03-03 PROCEDURE — 25010000002 VANCOMYCIN 1 G RECONSTITUTED SOLUTION 1 EACH VIAL: Performed by: ORTHOPAEDIC SURGERY

## 2020-03-03 PROCEDURE — 99233 SBSQ HOSP IP/OBS HIGH 50: CPT | Performed by: INTERNAL MEDICINE

## 2020-03-03 PROCEDURE — 25010000002 ENOXAPARIN PER 10 MG: Performed by: ORTHOPAEDIC SURGERY

## 2020-03-03 PROCEDURE — 25010000002 ONDANSETRON PER 1 MG: Performed by: ORTHOPAEDIC SURGERY

## 2020-03-03 PROCEDURE — 85018 HEMOGLOBIN: CPT | Performed by: ORTHOPAEDIC SURGERY

## 2020-03-03 PROCEDURE — 80048 BASIC METABOLIC PNL TOTAL CA: CPT | Performed by: ORTHOPAEDIC SURGERY

## 2020-03-03 PROCEDURE — 25010000002 NALOXONE PER 1 MG: Performed by: ORTHOPAEDIC SURGERY

## 2020-03-03 PROCEDURE — 25010000002 HALOPERIDOL LACTATE PER 5 MG: Performed by: INTERNAL MEDICINE

## 2020-03-03 PROCEDURE — 25010000002 VANCOMYCIN 1 G RECONSTITUTED SOLUTION

## 2020-03-03 PROCEDURE — 25010000002 MORPHINE PER 10 MG: Performed by: ORTHOPAEDIC SURGERY

## 2020-03-03 PROCEDURE — 85014 HEMATOCRIT: CPT | Performed by: ORTHOPAEDIC SURGERY

## 2020-03-03 RX ORDER — MORPHINE SULFATE 2 MG/ML
2 INJECTION, SOLUTION INTRAMUSCULAR; INTRAVENOUS EVERY 4 HOURS PRN
Status: DISCONTINUED | OUTPATIENT
Start: 2020-03-03 | End: 2020-03-04

## 2020-03-03 RX ORDER — NALOXONE HCL 0.4 MG/ML
0.4 VIAL (ML) INJECTION
Status: DISCONTINUED | OUTPATIENT
Start: 2020-03-03 | End: 2020-03-06

## 2020-03-03 RX ORDER — NALOXONE HCL 0.4 MG/ML
0.4 VIAL (ML) INJECTION
Status: DISCONTINUED | OUTPATIENT
Start: 2020-03-03 | End: 2020-03-04

## 2020-03-03 RX ORDER — OLANZAPINE 2.5 MG/1
5 TABLET ORAL 2 TIMES DAILY PRN
Status: DISCONTINUED | OUTPATIENT
Start: 2020-03-03 | End: 2020-03-06

## 2020-03-03 RX ORDER — VANCOMYCIN HYDROCHLORIDE 1 G/20ML
INJECTION, POWDER, LYOPHILIZED, FOR SOLUTION INTRAVENOUS
Status: COMPLETED
Start: 2020-03-03 | End: 2020-03-03

## 2020-03-03 RX ADMIN — DILTIAZEM HYDROCHLORIDE 30 MG: 30 TABLET, FILM COATED ORAL at 21:35

## 2020-03-03 RX ADMIN — LISINOPRIL 20 MG: 20 TABLET ORAL at 09:30

## 2020-03-03 RX ADMIN — LABETALOL HYDROCHLORIDE 10 MG: 5 INJECTION INTRAVENOUS at 17:29

## 2020-03-03 RX ADMIN — SODIUM CHLORIDE, POTASSIUM CHLORIDE, SODIUM LACTATE AND CALCIUM CHLORIDE 100 ML/HR: 600; 310; 30; 20 INJECTION, SOLUTION INTRAVENOUS at 12:36

## 2020-03-03 RX ADMIN — SENNOSIDES AND DOCUSATE SODIUM 2 TABLET: 8.6; 5 TABLET ORAL at 09:30

## 2020-03-03 RX ADMIN — MORPHINE SULFATE 6 MG: 10 INJECTION INTRAVENOUS at 02:21

## 2020-03-03 RX ADMIN — BRIMONIDINE TARTRATE 1 DROP: 2 SOLUTION/ DROPS OPHTHALMIC at 09:33

## 2020-03-03 RX ADMIN — LORAZEPAM 1 MG: 1 TABLET ORAL at 09:30

## 2020-03-03 RX ADMIN — SODIUM CHLORIDE, PRESERVATIVE FREE 10 ML: 5 INJECTION INTRAVENOUS at 09:31

## 2020-03-03 RX ADMIN — LORAZEPAM 2 MG: 1 TABLET ORAL at 21:35

## 2020-03-03 RX ADMIN — DORZOLAMIDE HCL 1 DROP: 20 SOLUTION/ DROPS OPHTHALMIC at 21:47

## 2020-03-03 RX ADMIN — OLANZAPINE 10 MG: 2.5 TABLET, FILM COATED ORAL at 21:35

## 2020-03-03 RX ADMIN — CETIRIZINE HYDROCHLORIDE 10 MG: 10 TABLET, FILM COATED ORAL at 09:30

## 2020-03-03 RX ADMIN — ENOXAPARIN SODIUM 40 MG: 40 INJECTION SUBCUTANEOUS at 09:31

## 2020-03-03 RX ADMIN — LATANOPROST 1 DROP: 50 SOLUTION OPHTHALMIC at 21:36

## 2020-03-03 RX ADMIN — DILTIAZEM HYDROCHLORIDE 30 MG: 30 TABLET, FILM COATED ORAL at 09:31

## 2020-03-03 RX ADMIN — SODIUM CHLORIDE 1000 MG: 900 INJECTION, SOLUTION INTRAVENOUS at 00:34

## 2020-03-03 RX ADMIN — VANCOMYCIN HYDROCHLORIDE: 1 INJECTION, POWDER, LYOPHILIZED, FOR SOLUTION INTRAVENOUS at 00:33

## 2020-03-03 RX ADMIN — SENNOSIDES AND DOCUSATE SODIUM 2 TABLET: 8.6; 5 TABLET ORAL at 21:35

## 2020-03-03 RX ADMIN — ONDANSETRON 4 MG: 2 INJECTION, SOLUTION INTRAMUSCULAR; INTRAVENOUS at 17:41

## 2020-03-03 RX ADMIN — TIMOLOL MALEATE 1 DROP: 5 SOLUTION/ DROPS OPHTHALMIC at 09:32

## 2020-03-03 RX ADMIN — TIMOLOL MALEATE 1 DROP: 5 SOLUTION/ DROPS OPHTHALMIC at 21:44

## 2020-03-03 RX ADMIN — FLUTICASONE PROPIONATE 2 SPRAY: 50 SPRAY, METERED NASAL at 09:32

## 2020-03-03 RX ADMIN — MORPHINE SULFATE 1 MG: 2 INJECTION, SOLUTION INTRAMUSCULAR; INTRAVENOUS at 01:42

## 2020-03-03 RX ADMIN — MORPHINE SULFATE 6 MG: 10 INJECTION INTRAVENOUS at 14:09

## 2020-03-03 RX ADMIN — SODIUM CHLORIDE, PRESERVATIVE FREE 10 ML: 5 INJECTION INTRAVENOUS at 21:45

## 2020-03-03 RX ADMIN — HALOPERIDOL LACTATE 2 MG: 5 INJECTION, SOLUTION INTRAMUSCULAR at 01:13

## 2020-03-03 RX ADMIN — LABETALOL HYDROCHLORIDE 10 MG: 5 INJECTION INTRAVENOUS at 12:45

## 2020-03-03 RX ADMIN — MELATONIN TAB 5 MG 10 MG: 5 TAB at 21:35

## 2020-03-03 RX ADMIN — BRIMONIDINE TARTRATE 1 DROP: 2 SOLUTION/ DROPS OPHTHALMIC at 21:49

## 2020-03-03 RX ADMIN — DORZOLAMIDE HCL 1 DROP: 20 SOLUTION/ DROPS OPHTHALMIC at 09:32

## 2020-03-03 RX ADMIN — NALOXONE HYDROCHLORIDE 0.4 MG: 0.4 INJECTION, SOLUTION INTRAMUSCULAR; INTRAVENOUS; SUBCUTANEOUS at 14:44

## 2020-03-03 NOTE — PROGRESS NOTES
"SERVICE: [facility] HOSPITALIST    CHIEF COMPLAINT: Hypoxia    SUBJECTIVE:    Pt examined on rounds around 1430, pt was non-responsive to sternal rub, SPo2 reading 60%, but unable to tell if there was a good pleth, notified respiratory who were occupied with a critically ill pt elsewhere, Stat response was called. Pt responded to Narcan.    When examined later, the pt was agitated and showing signs of hyperactive delirium. Not perseveratively complaining of pain as she had been yesterday. Re-orientated somewhat when interacting w/ grand-daughter care giver.    Pt was agitated overnight, got scheduled melatonin, and olanzapine, and her home ativan. (Family still insistent that ativan w/d is what caused encephalopathy during her last admission.), got 2mg of Haldol x1 last night at 0113, by report pt settled down for the rest of the night, but was lethargic this AM.     Reinforced with family and staff that awake during the day, and asleep at night in the best Delirium treatment possible.     ROS able to be obtained due to patient's mental status      OBJECTIVE:    /80 (BP Location: Left arm, Patient Position: Lying)   Pulse 99   Temp 98.6 °F (37 °C) (Oral)   Resp 18   Ht 157.5 cm (62\")   Wt 61.6 kg (135 lb 12.8 oz)   SpO2 91%   BMI 24.84 kg/m²     MEDS/LABS REVIEWED AND ORDERED      brimonidine 1 drop Right Eye BID   cetirizine 10 mg Oral Daily   dilTIAZem 30 mg Oral BID   dorzolamide 1 drop Right Eye BID   enoxaparin 40 mg Subcutaneous Daily   fluticasone 2 spray Nasal Daily   labetalol 10 mg Intravenous Q6H   latanoprost 1 drop Right Eye Nightly   levothyroxine 50 mcg Oral Q AM   lisinopril 20 mg Oral Daily   LORazepam 1 mg Oral Daily   LORazepam 2 mg Oral Nightly   melatonin 10 mg Oral Nightly   OLANZapine 10 mg Oral Nightly   pantoprazole 40 mg Oral Q AM   sennosides-docusate 2 tablet Oral BID   sodium chloride 10 mL Intravenous Q12H   timolol 1 drop Right Eye Q12H       Physical " Exam   Constitutional:   Initial exam, pt was non-responsive, breathing non-labored, No asscessory muscle use, blue tinted nail beds, with purple tint to lips.     After Narcan, pt awake, alert, agitated, rolled over looking intently at her grand daughter, interactive, but easily distractable   Eyes: Conjunctivae and EOM are normal.   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   Cardiovascular: Normal rate, regular rhythm and normal heart sounds. Exam reveals no friction rub.   No murmur heard.  Pulmonary/Chest: Effort normal and breath sounds normal. No stridor. No respiratory distress. She has no wheezes. She has no rales.   Abdominal: Soft. Bowel sounds are normal. She exhibits no distension. There is no tenderness. There is no guarding.   Musculoskeletal: Normal range of motion. She exhibits no edema.   Skin: Skin is warm and dry.   After narcan, lips pink, nail beds white, cap refil < 2sec   Nursing note and vitals reviewed.      LAB/DIAGNOSTICS:    Lab Results (last 24 hours)     Procedure Component Value Units Date/Time    Basic Metabolic Panel [[id number]]  (Abnormal) Collected:  03/03/20 0546    Specimen:  Blood Updated:  03/03/20 0621     Glucose 112 mg/dL      BUN 12 mg/dL      Creatinine 0.57 mg/dL      Sodium 138 mmol/L      Potassium 3.4 mmol/L      Chloride 100 mmol/L      CO2 27.4 mmol/L      Calcium 8.4 mg/dL      eGFR Non African Amer 100 mL/min/1.73      BUN/Creatinine Ratio 21.1     Anion Gap 10.6 mmol/L     Narrative:       GFR Normal >60  Chronic Kidney Disease <60  Kidney Failure <15      Hemoglobin & Hematocrit, Blood [[id number]]  (Abnormal) Collected:  03/03/20 0546    Specimen:  Blood Updated:  03/03/20 0555     Hemoglobin 10.5 g/dL      Hematocrit 31.2 %            Xr Hip With Or Without Pelvis 1 View Left    Result Date: 3/2/2020  Satisfactory postoperative exam. Signer Name: Martínez Archibald MD  Signed: 3/2/2020  6:37 PM  Workstation Name: [id number]-  [facility]        ASSESSMENT/PLAN:    Postmenopausal 89WF with hypothyroidism and hypertension who presents for closed L hip fragility fracture following a mechanical fall from ground height. She attempted to ambulate without assistance of a cane or walker, Now s/p Hip ORIF by Alok ( 3/2/2020), Stat response 3/3 Non-responsive, hypoxic, resolved w/ Narcan     #Closed left hip fragility fracture, POA, reason for admission  #Left ankle sprain, clinically diagnosed. POA.   #Osteoporosis, new diagnosis based on fragility fracture of l hip from standing height, POA, needs follow up and work up by PCP  #Periperative risk stratification  #Gait instability requiring ambulatory assistance  #Essential Hypertension, POA.   #Hypothyroidism  #Metabolic Encephalopathy w/ behavorial disturbance  #Acute Hypoxic Respiratory Failure due to Accidental Overdose of pain medication     -Management of pain, DVT prophylaxis, activity, and dressing changes per Ortho  - Will need to follow up with PCP regarding initiation of Bisphosphonate therapy  - Continue home antihypertensives. SBP's > 200's after Labetolol wears off, and with what appears to be adequate pain control, will schedule IV for now, and add prn hydralazine, Will monitor post op  - Continue home medications     - Continue home Ativan to avoid family's perception of w/d, though could be making Encephalopathy worse. Cautious use of opiates in this setting. Decreased Pain medications ordered from Morphine 6mg Q4H for sev pain, to 4mg.   - Maintain SpO2 >90% with supplemental O2. And capnography   - Will need to follow up with PT/OT once ms clears     - Have scheduled melatonin, feel though pt takes Ativan as an outpatient, PRN Ativan may be making the issue worse, On scheduled Olanzapine, and Haldol for prn, Will add olanzapine doses for PO prn, appears to be being under dosed on Haldol as only given x1 in last  24 hours, but multiple reports of continued agitation      - Has a living will stating that she is DNR Full Treatment. She and her family are agreeable to reversing code status for the purpose of surgery.

## 2020-03-03 NOTE — SIGNIFICANT NOTE
03/03/20 0838   Rehab Time/Intention   Evaluation Not Performed patient/family declined evaluation  (attempted to see for evaluation.  family present in room and state patient has only slept 3 hours all night and has been hallucinating. Family declines evaluation at this time.  will return in PM to attempt.)

## 2020-03-03 NOTE — SIGNIFICANT NOTE
03/03/20 1425   Rehab Time/Intention   Evaluation Not Performed other (see comments)  (Attempted eval, patient sleeping, O2 sats reading 60. MD entered and attmpeted to arouse patient with sternal rub. MD asked for respiratory to evaluate. RN notifed.  Will attempt eval again in AM.)   Rehab Treatment   Discipline occupational therapist

## 2020-03-03 NOTE — NURSING NOTE
Patient very confused and fidgiting in bed, removing pulse oxymeter and fidgiting with medical devices.  Patient very impulsive and RN designated as unsafe to ambulate.  At begininning of shift (roughly 2000) patient said limb was numb.  By the time numbness resolved confusion had set in.  Haldol administered as ordered.    Martínez Gay RN

## 2020-03-03 NOTE — PROGRESS NOTES
Patient: Paulo Hampton  Procedure(s):  HIP HEMIARTHROPLASTY and all associated procedures  Anesthesia type: MAC, spinal    Patient location: [facility] Surgical Floor  Last vitals:   Vitals:    03/03/20 0915   BP: 167/66   Pulse: 96   Resp: 14   Temp:    SpO2: 93%     Level of consciousness: Family reports some confusion overnight, but improved.  Patient asleep at time of visit.    Post-anesthesia pain: adequate analgesia  Airway patency: patent  Respiratory: unassisted  Cardiovascular: stable and blood pressure at baseline  Hydration: euvolemic    Anesthetic complications: no

## 2020-03-03 NOTE — PROGRESS NOTES
"SERVICE: [facility] HOSPITALIST    CHIEF COMPLAINT: Acute metabolic encephalopathy with behavioral disturbance    SUBJECTIVE:    Met with family in the room while patient was still in recovery in the late afternoon, family stated that the patient's behavior became angry and agitated at her primary caregiver last night when medications were given to treat her pain.    Discussed that we would intensify her therapy to help reduce her metabolic encephalopathy, that this likely represented at least a baseline mild cognitive decline since she is now had severe behavioral disturbances on the last 2 hospital admissions.    Patient was then seen in the recovery room, complaining of pain \"when I move my hip like this\".  Patient was hyperactive, and not oriented.  Asking to be returned to the hospital so she could find her family.    PACU nurse stated that patient tolerated the procedure well.  No issues.    ROS limited by PT's MS.    OBJECTIVE:    /68 (BP Location: Left arm, Patient Position: Lying)   Pulse 85   Temp 98.2 °F (36.8 °C) (Oral)   Resp 18   Ht 157.5 cm (62\")   Wt 61.6 kg (135 lb 12.8 oz)   SpO2 94%   BMI 24.84 kg/m²     MEDS/LABS REVIEWED AND ORDERED      brimonidine 1 drop Right Eye BID   cetirizine 10 mg Oral Daily   dilTIAZem 30 mg Oral BID   dorzolamide 1 drop Right Eye BID   [START ON 3/3/2020] enoxaparin 40 mg Subcutaneous Daily   fluticasone 2 spray Nasal Daily   labetalol 10 mg Intravenous Q6H   latanoprost 1 drop Right Eye Nightly   levothyroxine 50 mcg Oral Q AM   lisinopril 20 mg Oral Daily   LORazepam 1 mg Oral Daily   LORazepam 2 mg Oral Nightly   melatonin 10 mg Oral Nightly   OLANZapine 10 mg Oral Nightly   pantoprazole 40 mg Oral Q AM   sennosides-docusate 2 tablet Oral BID   sodium chloride 10 mL Intravenous Q12H   timolol 1 drop Right Eye Q12H   [START ON 3/3/2020] vancomycin 15 mg/kg Intravenous Once       Physical Exam   Constitutional: No distress.   Capnography " in nose, pt repeatedly attempted to remove   HENT:   Head: Normocephalic and atraumatic.   Right Ear: External ear normal.   Left Ear: External ear normal.   Nose: Nose normal.   Eyes: Pupils are equal, round, and reactive to light. Conjunctivae and EOM are normal.   Neck: No tracheal deviation present.   Cardiovascular: Normal rate, regular rhythm and normal heart sounds. Exam reveals no friction rub.   No murmur heard.  Pulmonary/Chest: Effort normal and breath sounds normal. No stridor. No respiratory distress. She has no wheezes. She has no rales.   Abdominal: Soft. Bowel sounds are normal. She exhibits no distension. There is no tenderness. There is no guarding.   Musculoskeletal: She exhibits no edema.   Neurological: She is disoriented. No cranial nerve deficit. She exhibits normal muscle tone. GCS eye subscore is 4. GCS verbal subscore is 4. GCS motor subscore is 6.   Skin: Skin is warm and dry. She is not diaphoretic. No erythema.   Psychiatric: Her affect is labile. Her speech is delayed, tangential and slurred. She is hyperactive. She is not aggressive, not actively hallucinating and not combative. Cognition and memory are impaired. She expresses impulsivity and inappropriate judgment. She exhibits abnormal recent memory. She is inattentive.   Nursing note and vitals reviewed.      LAB/DIAGNOSTICS:    Lab Results (last 24 hours)     ** No results found for the last 24 hours. **           Xr Chest 1 View    Result Date: 2/29/2020  No acute cardiopulmonary findings. Signer Name: Kris Quintana MD  Signed: 2/29/2020 9:44 PM  Workstation Name: [facility]    Xr Hip With Or Without Pelvis 1 View Left    Result Date: 3/2/2020  Satisfactory postoperative exam. Signer Name: Martínez Archibald MD  Signed: 3/2/2020 6:37 PM  Workstation Name: [facility]  [facility]        ASSESSMENT/PLAN:    Postmenopausal 89WF with hypothyroidism and hypertension who presents  for closed L hip fragility fracture following a mechanical fall from ground height. She attempted to ambulate without assistance of a cane or walker, Now s/p Hip ORIF by Alok ( 3/2/2020)     #Closed left hip fragility fracture, POA, reason for admission  #Left ankle sprain, clinically diagnosed. POA.   #Osteoporosis, new diagnosis based on fragility fracture of l hip from standing height, POA, needs follow up and work up by PCP  #Periperative risk stratification  #Gait instability requiring ambulatory assistance  #Essential Hypertension, POA.   #Hypothyroidism     -Management of pain, DVT prophylaxis, activity, and dressing changes per Ortho  - Will need to follow up with PCP regarding initiation of Bisphosphonate therapy  - Continue home antihypertensives. SBP's > 200's after Labetolol wears off, and with what appears to be adequate pain control, will schedule IV for now, and add prn hydralazine, Will monitor post op  - Continue home medications     - Continue home Ativan to avoid withdrawal. Cautious use of opiates in this setting.  - Maintain SpO2 >90% with supplemental O2. And capnography   - Will need to follow up with PT/OT post-operatively.      - Have scheduled melatonin, feel though pt takes Ativan as an outpatient, PRN Ativan may be making the issue worse, will add schedule a low Memo pain, and Haldol for the night, have counseled family on likely possibility that patient's agitation will be worse tonight than last night       - Has a living will stating that she is DNR Full Treatment. She and her family are agreeable to reversing code status for the purpose of surgery.

## 2020-03-03 NOTE — PLAN OF CARE
Problem: Patient Care Overview  Goal: Plan of Care Review  Outcome: Ongoing (interventions implemented as appropriate)  Flowsheets (Taken 3/3/2020 9538)  Progress: no change  Plan of Care Reviewed With: patient; family  Outcome Summary: Pt continues to appear agitated and fidgity. Pt does not follow commands. Pt is pleasant but does not cooperate. She remains confused. Coaching needed to take a drink of water, could not feed herself or follow commands to assist feed. Family at bedside. Unable to ambulate in [place] this shift. IVFs infusing.

## 2020-03-03 NOTE — SIGNIFICANT NOTE
03/03/20 0903   Rehab Time/Intention   Evaluation Not Performed patient/family declined evaluation  (Family stated that patient was up most of night (confused and figidety). pt was in bed reaching her arms in the air and grasping for objects that were not present. Will follow up later in day to see if her status has improved.)   Rehab Treatment   Discipline occupational therapist

## 2020-03-03 NOTE — PROGRESS NOTES
POD# 1 s/p left hip bipolar hemiarthroplasty for treatment of femoral neck fracture.     Subjective: Paulo Hampton resting in bed, confused and unable to follow commands. Afebrile overnight, family in room this am states patient was able to finally calm down around 0300, drowsy this am.     Objective:  Vitals:    03/03/20 0127 03/03/20 0312 03/03/20 0404 03/03/20 0633   BP:       BP Location:       Patient Position:       Pulse:  78 73 93   Resp:  14 14 16   Temp:    98.6 °F (37 °C)   TempSrc:    Oral   SpO2: 93% 93% 99% 90%   Weight:       Height:           Results from last 7 days   Lab Units 03/03/20  0546 02/29/20  2112   WBC 10*3/mm3  --  10.83*   HEMOGLOBIN g/dL 10.5* 13.5   HEMATOCRIT % 31.2* 40.9   PLATELETS 10*3/mm3  --  175       Results from last 7 days   Lab Units 03/03/20  0546 02/29/20  2112   SODIUM mmol/L 138 140   POTASSIUM mmol/L 3.4* 3.6   CHLORIDE mmol/L 100 103   CO2 mmol/L 27.4 23.0   BUN mg/dL 12 19   CREATININE mg/dL 0.57 0.80   GLUCOSE mg/dL 112* 118*   CALCIUM mg/dL 8.4* 9.5       Exam:    Left lower extremity-  Dressing clean/dry/intact  Compartment soft and easily compressible  First cap refill all digits left foot  2+ dorsalis pedis pulse  Localizes pain light palpation anterior, medial aspect thigh    Impression: s/p left hip hemiarthroplasty     Plan:  1. PT/OT-weightbearing as tolerated left lower extremity, posterior hip precautions 6 weeks  2. Pain control-Norco  3. DVT prophylaxis-Lovenox  4. Wound care-Dermabond dressing remain intact until follow-up in office, remove postop dressing day 3  5. Disposition- short term rehab verses home health once medically stable cleared by PT OT per hospitalist

## 2020-03-03 NOTE — NURSING NOTE
Vancomycin IV dose was not prepared by pharmacy.  1g overridden and pul in 250 bag of 0.9% NaCL.  Attempted to link lines but unsuscessful.    Martínez Gay RN

## 2020-03-03 NOTE — NURSING NOTE
Continued Stay Note  PAULO Pillai     Patient Name: Paulo Hampton  MRN: [id number]  Today's Date: 3/3/2020    Admit Date: 2/29/2020    Discharge Plan     Row Name 03/03/20 0948       Plan    Plan  SNF    Plan Comments  Per patients family - request referral to [facility]. Spoke with Yu/[facility], she will review - await decision. CM will continue to follow.        Discharge Codes    No documentation.             Jorge Wolf RN

## 2020-03-03 NOTE — NURSING NOTE
Continued Stay Note  PAULO Pillai     Patient Name: Paulo Hampton  MRN: [id number]  Today's Date: 3/3/2020    Admit Date: 2/29/2020    Discharge Plan     Row Name 03/03/20 1616       Plan    Plan  SNF @ dc    Plan Comments  Attempt to follow up with patient, PT is exiting room. Per Rosa PT, patient remains too sleepy to participate with therapy, will discuss [facility]CU vs [facility] once therapy is able to determine patients ability to work with therapy and assess progress, CM will continue to follow.        Discharge Codes    No documentation.             Jorge Wolf RN

## 2020-03-03 NOTE — SIGNIFICANT NOTE
03/03/20 1446   Rehab Time/Intention   Evaluation Not Performed   (attempted to see this x2 this PM.  pt remains lethargic and unable to remain alert to actively participate.  Will follow up to attempt evaluation in AM)   Rehab Treatment   Discipline physical therapist

## 2020-03-03 NOTE — PLAN OF CARE
Problem: Patient Care Overview  Goal: Plan of Care Review  Outcome: Ongoing (interventions implemented as appropriate)  Flowsheets (Taken 3/3/2020 0328)  Progress: no change  Plan of Care Reviewed With: family  Outcome Summary: Patient has had a very rough night, patient very agitated and fidgity.  Unable to ambulate on shift because of initial report of numbness of leg.  Patinet began to get very confused and agitated.  PRN medications given.  Pain assessed and treated and patient finally calmed down and stabilized respiratory status with 6mg morphine administered.  Patinet sleeping after dose of IV pain medication.  Environment manipulated to promote sleeping.  Goal: Individualization and Mutuality  Outcome: Ongoing (interventions implemented as appropriate)  Goal: Discharge Needs Assessment  Outcome: Ongoing (interventions implemented as appropriate)  Goal: Interprofessional Rounds/Family Conf  Outcome: Ongoing (interventions implemented as appropriate)     Problem: Fractured Hip (Adult)  Goal: Signs and Symptoms of Listed Potential Problems Will be Absent, Minimized or Managed (Fractured Hip)  Outcome: Ongoing (interventions implemented as appropriate)  Flowsheets (Taken 3/3/2020 0328)  Problems Assessed (Fractured Hip): all  Problems Present (Fractured Hip): acute cognitive dysfunction; pain; functional deficit/self-care deficit     Problem: Fall Risk (Adult)  Goal: Identify Related Risk Factors and Signs and Symptoms  Outcome: Ongoing (interventions implemented as appropriate)  Flowsheets (Taken 3/3/2020 0328)  Related Risk Factors (Fall Risk): confusion/agitation; gait/mobility problems; history of falls; polypharmacy; sleep pattern alteration; environment unfamiliar  Goal: Absence of Fall  Outcome: Ongoing (interventions implemented as appropriate)  Flowsheets (Taken 3/3/2020 0328)  Absence of Fall: making progress toward outcome

## 2020-03-04 LAB
HCT VFR BLD AUTO: 25.8 % (ref 34–46.6)
HGB BLD-MCNC: 8.9 G/DL (ref 12–15.9)

## 2020-03-04 PROCEDURE — 97166 OT EVAL MOD COMPLEX 45 MIN: CPT

## 2020-03-04 PROCEDURE — 97162 PT EVAL MOD COMPLEX 30 MIN: CPT

## 2020-03-04 PROCEDURE — 85018 HEMOGLOBIN: CPT | Performed by: ORTHOPAEDIC SURGERY

## 2020-03-04 PROCEDURE — 99232 SBSQ HOSP IP/OBS MODERATE 35: CPT | Performed by: INTERNAL MEDICINE

## 2020-03-04 PROCEDURE — 94799 UNLISTED PULMONARY SVC/PX: CPT

## 2020-03-04 PROCEDURE — 85014 HEMATOCRIT: CPT | Performed by: ORTHOPAEDIC SURGERY

## 2020-03-04 PROCEDURE — 25010000002 ENOXAPARIN PER 10 MG: Performed by: ORTHOPAEDIC SURGERY

## 2020-03-04 RX ORDER — MORPHINE SULFATE 2 MG/ML
2 INJECTION, SOLUTION INTRAMUSCULAR; INTRAVENOUS
Status: DISCONTINUED | OUTPATIENT
Start: 2020-03-04 | End: 2020-03-05

## 2020-03-04 RX ORDER — OLANZAPINE 2.5 MG/1
5 TABLET ORAL NIGHTLY
Status: DISCONTINUED | OUTPATIENT
Start: 2020-03-04 | End: 2020-03-06

## 2020-03-04 RX ORDER — NALOXONE HCL 0.4 MG/ML
0.4 VIAL (ML) INJECTION
Status: DISCONTINUED | OUTPATIENT
Start: 2020-03-04 | End: 2020-03-06

## 2020-03-04 RX ADMIN — LABETALOL HYDROCHLORIDE 10 MG: 5 INJECTION INTRAVENOUS at 05:13

## 2020-03-04 RX ADMIN — DORZOLAMIDE HCL 1 DROP: 20 SOLUTION/ DROPS OPHTHALMIC at 20:57

## 2020-03-04 RX ADMIN — ENOXAPARIN SODIUM 40 MG: 40 INJECTION SUBCUTANEOUS at 09:36

## 2020-03-04 RX ADMIN — DILTIAZEM HYDROCHLORIDE 30 MG: 30 TABLET, FILM COATED ORAL at 09:35

## 2020-03-04 RX ADMIN — HYDROCODONE BITARTRATE AND ACETAMINOPHEN 1 TABLET: 7.5; 325 TABLET ORAL at 16:17

## 2020-03-04 RX ADMIN — LEVOTHYROXINE SODIUM 50 MCG: 50 TABLET ORAL at 05:05

## 2020-03-04 RX ADMIN — HYDROCODONE BITARTRATE AND ACETAMINOPHEN 1 TABLET: 7.5; 325 TABLET ORAL at 05:05

## 2020-03-04 RX ADMIN — DILTIAZEM HYDROCHLORIDE 30 MG: 30 TABLET, FILM COATED ORAL at 20:56

## 2020-03-04 RX ADMIN — HYDROCODONE BITARTRATE AND ACETAMINOPHEN 1 TABLET: 7.5; 325 TABLET ORAL at 23:47

## 2020-03-04 RX ADMIN — SODIUM CHLORIDE, POTASSIUM CHLORIDE, SODIUM LACTATE AND CALCIUM CHLORIDE 100 ML/HR: 600; 310; 30; 20 INJECTION, SOLUTION INTRAVENOUS at 13:35

## 2020-03-04 RX ADMIN — CETIRIZINE HYDROCHLORIDE 10 MG: 10 TABLET, FILM COATED ORAL at 09:35

## 2020-03-04 RX ADMIN — BRIMONIDINE TARTRATE 1 DROP: 2 SOLUTION/ DROPS OPHTHALMIC at 20:57

## 2020-03-04 RX ADMIN — OLANZAPINE 5 MG: 2.5 TABLET, FILM COATED ORAL at 05:05

## 2020-03-04 RX ADMIN — SODIUM CHLORIDE, PRESERVATIVE FREE 10 ML: 5 INJECTION INTRAVENOUS at 09:36

## 2020-03-04 RX ADMIN — FLUTICASONE PROPIONATE 2 SPRAY: 50 SPRAY, METERED NASAL at 09:37

## 2020-03-04 RX ADMIN — LABETALOL HYDROCHLORIDE 10 MG: 5 INJECTION INTRAVENOUS at 18:27

## 2020-03-04 RX ADMIN — SENNOSIDES AND DOCUSATE SODIUM 2 TABLET: 8.6; 5 TABLET ORAL at 09:36

## 2020-03-04 RX ADMIN — DORZOLAMIDE HCL 1 DROP: 20 SOLUTION/ DROPS OPHTHALMIC at 09:36

## 2020-03-04 RX ADMIN — LORAZEPAM 1 MG: 1 TABLET ORAL at 09:36

## 2020-03-04 RX ADMIN — LABETALOL HYDROCHLORIDE 10 MG: 5 INJECTION INTRAVENOUS at 23:40

## 2020-03-04 RX ADMIN — SENNOSIDES AND DOCUSATE SODIUM 2 TABLET: 8.6; 5 TABLET ORAL at 20:56

## 2020-03-04 RX ADMIN — TIMOLOL MALEATE 1 DROP: 5 SOLUTION/ DROPS OPHTHALMIC at 09:36

## 2020-03-04 RX ADMIN — LATANOPROST 1 DROP: 50 SOLUTION OPHTHALMIC at 20:57

## 2020-03-04 RX ADMIN — SODIUM CHLORIDE, PRESERVATIVE FREE 10 ML: 5 INJECTION INTRAVENOUS at 20:56

## 2020-03-04 RX ADMIN — SODIUM CHLORIDE, POTASSIUM CHLORIDE, SODIUM LACTATE AND CALCIUM CHLORIDE 100 ML/HR: 600; 310; 30; 20 INJECTION, SOLUTION INTRAVENOUS at 00:49

## 2020-03-04 RX ADMIN — MELATONIN TAB 5 MG 10 MG: 5 TAB at 20:56

## 2020-03-04 RX ADMIN — LISINOPRIL 20 MG: 20 TABLET ORAL at 09:35

## 2020-03-04 RX ADMIN — LABETALOL HYDROCHLORIDE 10 MG: 5 INJECTION INTRAVENOUS at 11:59

## 2020-03-04 RX ADMIN — BRIMONIDINE TARTRATE 1 DROP: 2 SOLUTION/ DROPS OPHTHALMIC at 09:36

## 2020-03-04 RX ADMIN — OLANZAPINE 5 MG: 2.5 TABLET, FILM COATED ORAL at 20:55

## 2020-03-04 RX ADMIN — PANTOPRAZOLE SODIUM 40 MG: 40 TABLET, DELAYED RELEASE ORAL at 05:05

## 2020-03-04 RX ADMIN — TIMOLOL MALEATE 1 DROP: 5 SOLUTION/ DROPS OPHTHALMIC at 20:57

## 2020-03-04 RX ADMIN — LORAZEPAM 2 MG: 1 TABLET ORAL at 20:56

## 2020-03-04 NOTE — PROGRESS NOTES
"SERVICE: [facility] HOSPITALIST    CHIEF COMPLAINT: hip fx     SUBJECTIVE:    Mental status seems improved but otherwise unobtainable.  She is less somnolent..    ROS able to be obtained due to patient's mental status      OBJECTIVE:    BP (!) 181/77 (BP Location: Right arm, Patient Position: Lying)   Pulse 86   Temp 99.1 °F (37.3 °C) (Oral)   Resp 18   Ht 157.5 cm (62\")   Wt 61.6 kg (135 lb 12.8 oz)   SpO2 93%   BMI 24.84 kg/m²     MEDS/LABS REVIEWED AND ORDERED      brimonidine 1 drop Right Eye BID   cetirizine 10 mg Oral Daily   dilTIAZem 30 mg Oral BID   dorzolamide 1 drop Right Eye BID   enoxaparin 40 mg Subcutaneous Daily   fluticasone 2 spray Nasal Daily   labetalol 10 mg Intravenous Q6H   latanoprost 1 drop Right Eye Nightly   levothyroxine 50 mcg Oral Q AM   lisinopril 20 mg Oral Daily   LORazepam 1 mg Oral Daily   LORazepam 2 mg Oral Nightly   melatonin 10 mg Oral Nightly   OLANZapine 10 mg Oral Nightly   pantoprazole 40 mg Oral Q AM   sennosides-docusate 2 tablet Oral BID   sodium chloride 10 mL Intravenous Q12H   timolol 1 drop Right Eye Q12H       Physical Exam   Constitutional:   Somnolent but awake and alert to person and place   Eyes: Conjunctivae and EOM are normal.    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   Cardiovascular: Normal rate, regular rhythm and normal heart sounds. Exam reveals no friction rub.   No murmur heard.  Pulmonary/Chest: Effort normal and breath sounds normal. No stridor. No respiratory distress. She has no wheezes. She has no rales.   Abdominal: Soft. Bowel sounds are normal. She exhibits no distension. There is no tenderness. There is no guarding.   Musculoskeletal: Normal range of motion. She exhibits no edema.   Skin: Skin is warm and dry.   Nursing note and vitals reviewed.      LAB/DIAGNOSTICS:    Lab Results (last 24 hours)     Procedure Component Value Units Date/Time    Hemoglobin & " Hematocrit, Blood [[id number]]  (Abnormal) Collected:  03/04/20 0528    Specimen:  Blood Updated:  03/04/20 0603     Hemoglobin 8.9 g/dL      Hematocrit 25.8 %            Xr Hip With Or Without Pelvis 1 View Left    Result Date: 3/2/2020  Satisfactory postoperative exam. Signer Name: Martínez Archibald MD  Signed: 3/2/2020 6:37 PM  Workstation Name: [facility]  [facility] [place]        ASSESSMENT/PLAN:    Postmenopausal 89WF with hypothyroidism and hypertension who presents for closed L hip fragility fracture following a mechanical fall from ground height. She attempted to ambulate without assistance of a cane or walker, Now s/p Hip ORIF by Alok ( 3/2/2020),     #Closed left hip fragility fracture, POA, reason for admission  #Left ankle sprain, clinically diagnosed. POA.   #Osteoporosis, new diagnosis based on fragility fracture of l hip from standing height, POA, needs follow up and work up by PCP  #Gait instability requiring ambulatory assistance  #Essential Hypertension, POA.   #Hypothyroidism  #Metabolic Encephalopathy -improving  #Acute Hypoxic Respiratory Failure due to oversedation pain medication-improved  #Acute anemia likely acute blood loss anemia as expected postop    - DVT prophylaxis, activity, and dressing changes per Ortho  -Limit narcotics as much as able  - Will need to follow up with PCP regarding initiation of Bisphosphonate therapy  - Continue home antihypertensives and add prn hydralazine, Will monitor post op  - Continue home medications     - Continue home Ativan to avoid family's perception of w/d, though could be making Encephalopathy worse.    - Maintain SpO2 >90% with supplemental O2. And capnography   - Will need to follow up with PT/OT once ms clears     - Have scheduled melatonin, feel though pt takes Ativan as an outpatient, PRN Ativan may be making the issue worse, On scheduled Olanzapine, and  olanzapine doses for PO prn,     -For anemia hemoglobin remains 8.9  baseline appears to be around 11.  Follow and transfuse if less than 7     - Has a living will stating that she is DNR Full Treatment  -If continues to improve feel that she will be ready for rehab in the next several days

## 2020-03-04 NOTE — PLAN OF CARE
Problem: Patient Care Overview  Goal: Plan of Care Review  Flowsheets (Taken 3/4/2020 1526)  Plan of Care Reviewed With: patient  Outcome Summary: PT Evaluation  Complete: Patient is dependent x 2 for rolling, supine to/from sit and scooting in bed. She initially is dependent to maintain static sitting balance with significant right lateral lean with periods of min A. Patient more alert today, oriented to person only however significantly confused. She follows approximately 10% of one step commands with significant cues. Patient would benefit from skilled PT services to address deficits in functional mobility. Recommend SNF at discharge with potential to transition to LTC due to cognitive status.

## 2020-03-04 NOTE — NURSING NOTE
Continued Stay Note  PAULO Pillai     Patient Name: Paulo Hampton  MRN: [id number]  Today's Date: 3/4/2020    Admit Date: 2/29/2020    Discharge Plan     Row Name 03/04/20 1046       Plan    Plan  [facility].      Plan Comments  Spoke with Neisha at [facility].  Neisha is following pt for admit, Neisha feels pt is too medically and cognitivly unstable for admit at [facility] at this time.  They will follow along to make sure that pt is able to participate in PT/Ot and O2 sats remain stable.  Precert will not be started until stable.  Neisha states she will also run insurance to see if pt can come as a Long term resident          Discharge Codes    No documentation.             Mario Zamora RN

## 2020-03-04 NOTE — NURSING NOTE
Dr Mujica notified RN that patient was not responding and O2 sats noted in 70s. Upon entering room, patient not responsive. Stat response called. Narcan given and effective.

## 2020-03-04 NOTE — PLAN OF CARE
Problem: Patient Care Overview  Goal: Plan of Care Review  Outcome: Ongoing (interventions implemented as appropriate)  Flowsheets (Taken 3/4/2020 9815)  Progress: no change  Plan of Care Reviewed With: patient; family  Outcome Summary: Pt calmer and less agitated today. Oxygen titrated down to 2L/NC. Attempt room air, sats noted mid 80s, O2 back to 2L/NC. Multiple BM today. Pt needs encouragement to eat. Family as been at bedside and attentive. Saline lock IV.

## 2020-03-04 NOTE — THERAPY EVALUATION
Acute Care - Occupational Therapy Initial Evaluation   Dania Boland     Patient Name: Paulo Hampton  : 1930  MRN: [id number]  Today's Date: 3/4/2020  Onset of Illness/Injury or Date of Surgery: 20(surgery 3/2/20)  Date of Referral to OT: 20  Referring Physician: Dr. Dickinson    Admit Date: 2020       ICD-10-CM ICD-9-CM   1. Closed fracture of left hip, initial encounter (CMS/[facility]) S72.002A 820.8   2. Fall, initial encounter W19.XXXA E888.9   3. Skin avulsion T14.8XXA 879.8   4. 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 difficile colitis    • Diarrhea    • GERD (gastroesophageal reflux disease)    • Hyperlipidemia    • Hypertension    • Hypothyroid    • Presbyesophagus    • Sinus congestion    • Thrush      Past Surgical History:   Procedure Laterality Date   • ABDOMINAL SURGERY     • APPENDECTOMY     • CHOLECYSTECTOMY     • ENDOSCOPY N/A 2017    Procedure: ESOPHAGOGASTRODUODENOSCOPY WITH DILATION;  Surgeon: Betito Montejo MD;  Location:  [facility] OR;  Service:    • HIP HEMIARTHROPLASTY Left 3/2/2020    Procedure: HIP HEMIARTHROPLASTY and all associated procedures;  Surgeon: Jeff Dickinson MD;  Location:  [facility] OR;  Service: Orthopedics;  Laterality: Left;   • HYSTERECTOMY     • KNEE ARTHROSCOPY Right    • THYROIDECTOMY            OT ASSESSMENT FLOWSHEET (last 12 hours)      Occupational Therapy Evaluation     Row Name 20 0914                    OT Evaluation Time/Intention    Subjective Information  -- patient very confused, agreed to eval  -EN        Document Type  evaluation  -EN        Mode of Treatment  occupational therapy  -EN        Patient Effort  adequate  -EN        Symptoms Noted During/After Treatment  increased pain  -EN           General Information    Patient Profile Reviewed?  yes  -EN        Onset of Illness/Injury or Date of Surgery  02/29/20 surgery 3/2/20  -EN        Referring Physician  Dr. Dickinson  -EN        Patient Observations  alert;agree to therapy  -EN        Patient/Family Observations  Patient reclined in bed, RN present. Agreed to evaluation.  -EN        Prior Level of Function  independent:;all household mobility;ADL's per family  -EN        Equipment Currently Used at Home  cane, straight;rollator  -EN        Pertinent History of Current Functional Problem  Patient brought to the ED after a fall at home (tripped on rug) resulting in a L hip fracture and a left sprained ankle. Patient is now s/p L bipolar hemiarthoplasty with posterior hip precautions.  -EN        Existing Precautions/Restrictions  fall;hip, posterior  -EN        Risks Reviewed  patient:;family:;increased discomfort  -EN        Benefits Reviewed  patient:;family:;improve function;increase independence;increase strength;increase balance  -EN        Barriers to Rehab  cognitive status patient with confusion  -EN           Relationship/Environment    Lives With  alone  -EN           Resource/Environmental Concerns    Current Living Arrangements  home/apartment/condo  -EN           Cognitive Assessment/Intervention- PT/OT    Orientation Status (Cognition)  oriented to;person name and birthdate only  -EN        Follows Commands (Cognition)  follows one step commands;0-24% accuracy;physical/tactile prompts required;repetition of directions required;verbal cues/prompting required;initiation impaired required significant cues for one step commands  -EN         Safety Deficit (Cognitive)  insight into deficits/self awareness;ability to follow commands;awareness of need for assistance;problem solving;safety precautions awareness;safety precautions follow-through/compliance;other (see comments) signficant confusion and very [place]  -EN           Bed Mobility Assessment/Treatment    Bed Mobility Assessment/Treatment  supine-sit;sit-supine;scooting/bridging  -EN        Scooting/Bridging [place] (Bed Mobility)  dependent (less than 25% patient effort);2 person assist  -EN        Supine-Sit [place] (Bed Mobility)  dependent (less than 25% patient effort);2 person assist  -EN        Sit-Supine [place] (Bed Mobility)  verbal cues;dependent (less than 25% patient effort);2 person assist  -EN        Bed Mobility, Safety Issues  cognitive deficits limit understanding;decreased use of legs for bridging/pushing  -EN        Assistive Device (Bed Mobility)  bed rails;draw sheet;head of bed elevated  -EN        Comment (Bed Mobility)  Patient required significant assistance and cues for all bed mobility  -EN           Functional Mobility    Functional Mobility- Comment  deferred  -EN           Transfer Assessment/Treatment    Comment (Transfers)  Patient with poor sitting balance, did not attempt standing  -EN           ADL Assessment/Intervention    BADL Assessment/Intervention  bathing;upper body dressing;lower body dressing  -EN           Bathing Assessment/Intervention    Comment (Bathing)  Anticipate max to dependent for bathing and dressing due to cognition and poor balance  -EN           General ROM    GENERAL ROM COMMENTS  B UE AROM WFL  -EN           MMT (Manual Muscle Testing)    General MMT Comments  unable to formally assess due to cognition  -EN           Motor Assessment/Interventions    Additional Documentation  Balance (Group)  -EN           Balance    Balance  static sitting balance  -EN           Static Sitting Balance    Level of [place]  (Unsupported Sitting, Static Balance)  dependent, less than 25% patient effort;other (see comments);minimal assist, 75% patient effort;contact guard assist brief periods of CGA to min assist  -EN        Time Able to Maintain Position (Unsupported Sitting, Static Balance)  4 to 5 minutes  -EN           Wound 03/01/20 0907 Left elbow Skin Tear    Wound - Properties Group Date first assessed: 03/01/20  -MD Time first assessed: 0907  -MD Present on Hospital Admission: Y  -MD Side: Left  -MD Location: elbow  -MD Primary Wound Type: Skin tear  -MD Stage, Pressure Injury: other (see comments)  -MD Additional Comments: From fall pre-admission  -MD       Wound 03/02/20 1621 Left lateral hip Incision    Wound - Properties Group Date first assessed: 03/02/20  -ME Time first assessed: 1621  -ME Side: Left  -ME Orientation: lateral  -ME Location: hip  -ME Primary Wound Type: Incision  -ME Additional Comments: prineo, telfa, tegaderms, abduction pillow  -ME       Plan of Care Review    Plan of Care Reviewed With  patient;grandchild(vitor)  -EN        Outcome Summary  OT evaluation completed. patient very confused and oriented to name only. Patient dependent X 2 for all bed mobility and was dependent at times for static sitting balance with lean to the right side but had brief periods of CGA to min assist for sitting balance. UE AROM was WFL, unable to formally test UE strength due to cognition. Anticipate max to dependent for ADLs. OT to follow while in the hospital. Patient will need SNF at discharge with possible transition to ECF due to cognitive status.    -EN           Clinical Impression (OT)    Date of Referral to OT  03/03/20  -EN        OT Diagnosis  weakness, decreased ADL independence.  -EN        Criteria for Skilled Therapeutic Interventions Met (OT Eval)  yes;treatment indicated  -EN        Rehab Potential (OT Eval)  good, to achieve stated therapy goals  -EN        Predicted Duration of Therapy Intervention (Therapy  Eval)  will continue to assess  -EN        Care Plan Review (OT)  evaluation/treatment results reviewed  -EN        Care Plan Review, Other Participant (OT Eval)  other (see comments) [facility]  -EN        Anticipated Equipment Needs at Discharge (OT)  -- will continue to assess equipment needs  -EN        Patient/Family Concerns, Equipment Needs at Discharge (OT)  [facility] would like patient to discharge to [facility].  -EN        Anticipated Discharge Disposition (OT)  skilled nursing facility;extended care facility  -EN           OT Goals    Transfer Goal Selection (OT)  transfer, OT goal 1  -EN        Balance Goal Selection (OT)  balance, OT goal 1  -EN        Caregiver Training Goal Selection (OT)  caregiver training, OT goal 1  -EN        Additional Documentation  Balance Goal Selection (OT) (Row);Caregiver Training Goal Selection (OT) (Row)  -EN           Transfer Goal 1 (OT)    Activity/Assistive Device (Transfer Goal 1, OT)  sit-to-stand/stand-to-sit;walker, rolling for improved ind. with toileting/adls  -EN        [place] Level/Cues Needed (Transfer Goal 1, OT)  maximum assist (25-49% patient effort);2 person assist  -EN        Time Frame (Transfer Goal 1, OT)  by discharge  -EN        Progress/Outcome (Transfer Goal 1, OT)  other (see comments) new goal  -EN           Balance Goal 1 (OT)    Activity/Assistive Device (Balance Goal 1, OT)  sitting, static  -EN        [place] Level/Cues Needed (Balance Goal 1, OT)  contact guard assist  -EN        Time Frame (Balance Goal 1, OT)  by discharge  -EN        Progress/Outcomes (Balance Goal 1, OT)  other (see comments) new goal  -EN           Caregiver Training Goal 1 (OT)    Caregiver Training Goal 1 (OT)  Caregivers recall 3/3 hip precautions  -EN        Time Frame (Caregiver Training Goal 1, OT)  2 days  -EN        Progress/Outcomes (Caregiver Training Goal 1, OT)  other (see comments) new goal  -EN           Living Environment    Home  Accessibility  other (see comments) no stairs  -EN          User Key  (r) = Recorded By, (t) = Taken By, (c) = Cosigned By    Initials Name Effective Dates    EN Concepcion Enriquez NEAL, OTR 06/22/16 -     Clary Coulter, RN 09/30/16 -     Jackelyn Kwon RN 07/18/19 -                OT Recommendation and Plan  Outcome Summary/Treatment Plan (OT)  Anticipated Equipment Needs at Discharge (OT): (will continue to assess equipment needs)  Patient/Family Concerns, Equipment Needs at Discharge (OT): [facility] would like patient to discharge [facility].  Anticipated Discharge Disposition (OT): skilled nursing facility, extended care facility  Plan of Care Review  Plan of Care Reviewed With: patient, grandchild(vitor)  Plan of Care Reviewed With: patient, grandchild(vitor)  Outcome Summary: OT evaluation completed. patient very confused and oriented to name only. Patient dependent X 2 for all bed mobility and was dependent at times for static sitting balance with lean to the right side but had brief periods of CGA to min assist for sitting balance. UE AROM was WFL, unable to formally test UE strength due to cognition. Anticipate max to dependent for ADLs. OT to follow while in the hospital. Patient will need SNF at discharge with possible transition to ECF due to cognitive status.      Outcome Measures     Row Name 03/04/20 1100 03/04/20 0914          How much help from another is currently needed...    Putting on and taking off regular lower body clothing?  --  1  -EN     Bathing (including washing, rinsing, and drying)  --  1  -EN     Toileting (which includes using toilet bed pan or urinal)  --  1  -EN     Putting on and taking off regular upper body clothing  --  1  -EN     Taking care of personal grooming (such as brushing teeth)  --  2  -EN     Eating meals  --  2  -EN     AM-PAC 6 Clicks Score (OT)  --  8  -EN        Functional Assessment    Outcome Measure Options  AM-PAC 6 Clicks Daily Activity (OT)   -EN  --       User Key  (r) = Recorded By, (t) = Taken By, (c) = Cosigned By    Initials Name Provider Type    Concepcion Willard OTR Occupational Therapist          Time Calculation:   Time Calculation- OT     Row Name 03/04/20 1155             Time Calculation- OT    OT Start Time  0913  -EN        User Key  (r) = Recorded By, (t) = Taken By, (c) = Cosigned By    Initials Name Provider Type    Concepcion Willard OTR Occupational Therapist        Therapy Charges for Today     Code Description Service Date Service Provider Modifiers Qty    [id number]  OT EVAL MOD COMPLEXITY 3 3/4/2020 Concepcion Enriquez OTR GO 1               NELDA Benson  3/4/2020

## 2020-03-04 NOTE — PLAN OF CARE
Problem: Patient Care Overview  Goal: Plan of Care Review  Flowsheets (Taken 3/4/2020 0930)  Outcome Summary: OT evaluation completed. patient very confused and oriented to name only. Patient dependent X 2 for all bed mobility and was dependent at times for static sitting balance with lean to the right side but had brief periods of CGA to min assist for sitting balance. UE AROM was WFL, unable to formally test UE strength due to cognition. Anticipate max to dependent for ADLs. OT to follow while in the hospital. Patient will need SNF at discharge with possible transition to ECF due to cognitive status.

## 2020-03-04 NOTE — PROGRESS NOTES
POD# 2 s/p left hip bipolar hemiarthroplasty for treatment of femoral neck fracture    Subjective:Paulo Hampton resting in bed this a.m. alert to self, extremely hard of hearing.  Pain well controlled.  Denies presence of numbness or tingling left lower extremity.    Objective:  Vitals:    03/04/20 0600 03/04/20 0935 03/04/20 1047 03/04/20 1155   BP: 127/60   (!) 181/77   BP Location: Left arm   Right arm   Patient Position: Lying   Lying   Pulse: 72 90  86   Resp: 18   18   Temp: 98.9 °F (37.2 °C)   99.1 °F (37.3 °C)   TempSrc: Oral   Oral   SpO2: 99%  98% 94%   Weight:       Height:           Results from last 7 days   Lab Units 03/04/20  0528 03/03/20  0546 02/29/20  2112   WBC 10*3/mm3  --   --  10.83*   HEMOGLOBIN g/dL 8.9* 10.5* 13.5   HEMATOCRIT % 25.8* 31.2* 40.9   PLATELETS 10*3/mm3  --   --  175       Results from last 7 days   Lab Units 03/03/20  0546 02/29/20  2112   SODIUM mmol/L 138 140   POTASSIUM mmol/L 3.4* 3.6   CHLORIDE mmol/L 100 103   CO2 mmol/L 27.4 23.0   BUN mg/dL 12 19   CREATININE mg/dL 0.57 0.80   GLUCOSE mg/dL 112* 118*   CALCIUM mg/dL 8.4* 9.5       Exam:    Left lower extremity-  Dressing clean/dry/intact  Compartment soft and easily compressible  First cap refill all digits left foot  2+ dorsalis pedis pulse  Moderate ecchymosis dorsum foot  Flex extend all digits left foot  Plantarflex dorsiflex ankle    Impression: s/p left hip bipolar hemiarthroplasty for treatment of femoral neck fracture    Plan:  1. PT/OT- weightbearing as tolerated left lower extremity, posterior hip precautions 6 weeks  2. Pain control- Norco  3. DVT prophylaxis- Lovenox  4. Wound care- Dermabond dressing remain intact until follow-up in office, remove postop dressing day 3  5. Disposition- likely short term rehab once medically stable cleared by PT/OT per hospitalist

## 2020-03-04 NOTE — THERAPY EVALUATION
Acute Care - Physical Therapy Initial Evaluation   Dania Boland     Patient Name: Paulo Hampton  : 1930  MRN: [id number]  Today's Date: 3/4/2020   Onset of Illness/Injury or Date of Surgery: 20(surgery 3/2/20)  Date of Referral to PT: 20  Referring Physician: Dr. Dickinson      Admit Date: 2020    Visit Dx:     ICD-10-CM ICD-9-CM   1. Closed fracture of left hip, initial encounter (CMS/[facility]) S72.002A 820.8   2. Fall, initial encounter W19.XXXA E888.9   3. Skin avulsion T14.8XXA 879.8   4. 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 difficile colitis    • Diarrhea    • GERD (gastroesophageal reflux disease)    • Hyperlipidemia    • Hypertension    • Hypothyroid    • Presbyesophagus    • Sinus congestion    • Thrush      Past Surgical History:   Procedure Laterality Date   • ABDOMINAL SURGERY     • APPENDECTOMY     • CHOLECYSTECTOMY     • ENDOSCOPY N/A 2017    Procedure: ESOPHAGOGASTRODUODENOSCOPY WITH DILATION;  Surgeon: Betito Montejo MD;  Location: [facility] OR;  Service:    • HIP HEMIARTHROPLASTY Left 3/2/2020    Procedure: HIP HEMIARTHROPLASTY and all associated procedures;  Surgeon: Jeff Dickinson MD;  Location:  [facility] OR;  Service: Orthopedics;  Laterality: Left;   • HYSTERECTOMY     • KNEE ARTHROSCOPY Right    • THYROIDECTOMY          PT ASSESSMENT (last 12 hours)      Physical Therapy Evaluation     Row Name 20 0913          PT  Evaluation Time/Intention    Subjective Information  -- Patient confused. No complaints   -BP     Document Type  evaluation  -BP     Mode of Treatment  physical therapy  -BP     Patient Effort  adequate  -BP     Symptoms Noted During/After Treatment  increased pain  -BP     Row Name 03/04/20 0913          General Information    Patient Profile Reviewed?  yes  -BP     Onset of Illness/Injury or Date of Surgery  02/29/20  -BP     Referring Physician  Dr. Dickinson  -BP     Patient Observations  cooperative;agree to therapy  -BP     Patient/Family Observations  Patient supine in bed with HOB elevated. Agreeable to PT evaluation. Patient on 4L O2/NC   -BP     Prior Level of Function  -- See pertinent history   -BP     Equipment Currently Used at Home  cane, straight;rollator  -BP     Pertinent History of Current Functional Problem  Patient presents to ED s/p fall at home. Patient diagnosed with L hip fracture, now s/p L bipolar hemiarthoplasty with posterior hip precautions. Per family, patient was independent with mobility with use of rollator at home.   -BP     Existing Precautions/Restrictions  fall;hip, posterior;left  -BP     Risks Reviewed  patient:;family:;LOB;increased discomfort  -BP     Benefits Reviewed  patient:;improve function;increase independence;increase strength  -BP     Barriers to Rehab  cognitive status Patient with post op confusion   -BP     Row Name 03/04/20 0913          Relationship/Environment    Lives With  alone  -BP     Row Name 03/04/20 0913          Living Environment    Living Arrangements  house  -BP     Home Accessibility  -- no stairs to enter home   -BP     Row Name 03/04/20 0913          Cognitive Assessment/Interventions    Additional Documentation  Cognitive Assessment/Intervention (Group)  -BP     Row Name 03/04/20 0913          Cognitive Assessment/Intervention- PT/OT    Orientation Status (Cognition)  disoriented to;person  -BP     Follows Commands (Cognition)  follows one step  commands;initiation impaired;verbal cues/prompting required;physical/tactile prompts required;delayed response/completion;0-24% accuracy approximately 10% of one step commands with cues   -BP     Safety Deficit (Cognitive)  insight into deficits/self awareness;problem solving;safety precautions awareness;safety precautions follow-through/compliance;awareness of need for assistance significnt confusion and [place]   -BP     Row Name 03/04/20 0913          Bed Mobility Assessment/Treatment    Bed Mobility Assessment/Treatment  supine-sit;sit-supine;rolling left;rolling right;scooting/bridging  -BP     Rolling Left [place] (Bed Mobility)  maximum assist (25% patient effort);verbal cues;2 person assist  -BP     Rolling Right [place] (Bed Mobility)  maximum assist (25% patient effort);verbal cues;2 person assist  -BP     Scooting/Bridging [place] (Bed Mobility)  dependent (less than 25% patient effort);2 person assist  -BP     Supine-Sit [place] (Bed Mobility)  dependent (less than 25% patient effort);2 person assist  -BP     Sit-Supine [place] (Bed Mobility)  dependent (less than 25% patient effort);2 person assist;verbal cues  -BP     Bed Mobility, Safety Issues  cognitive deficits limit understanding;decreased use of legs for bridging/pushing  -BP     Assistive Device (Bed Mobility)  bed rails;draw sheet;head of bed elevated  -BP     Comment (Bed Mobility)  Patient requires significant assist with all bed mobility. She requires assist to maintain hip precautions.   -BP     Row Name 03/04/20 0913          Transfer Assessment/Treatment    Transfer Assessment/Treatment  --  -BP     Comment (Transfers)  Unable to attempt transfers due to poor sitting balance   -BP     Row Name 03/04/20 0913          General ROM    GENERAL ROM COMMENTS  R LE AROM WFL. L knee and ankle AROM WFL. Noted significnt bruising to L ankle   -BP     Row Name 03/04/20 0913          MMT (Manual Muscle Testing)    General MMT  Comments  Unable to formerly assess due to poor sitting balance. Patient able to complete LAQ bilaterally  -BP     Row Name 03/04/20 0913          Balance    Balance  static sitting balance  -BP     Row Name 03/04/20 0913          Static Sitting Balance    Level of [place] (Unsupported Sitting, Static Balance)  dependent, less than 25% patient effort;minimal assist, 75% patient effort;2 person assist  -BP     Time Able to Maintain Position (Unsupported Sitting, Static Balance)  4 to 5 minutes  -BP     Comment (Unsupported Sitting, Static Balance)  significant right lateral lean. Requires varying amounts of assist from dependent to min   -BP     Row Name 03/04/20 0913          Pain Assessment    Additional Documentation  Pain Scale: Numbers Pre/Post-Treatment (Group)  -BP     Row Name 03/04/20 0913          Pain Scale: Numbers Pre/Post-Treatment    Pre/Post Treatment Pain Comment  Patient unable to rate pain due to cognition   -BP     Row Name             Wound 03/01/20 0907 Left elbow Skin Tear    Wound - Properties Group Date first assessed: 03/01/20  -MD Time first assessed: 0907  -MD Present on Hospital Admission: Y  -MD Side: Left  -MD Location: elbow  -MD Primary Wound Type: Skin tear  -MD Stage, Pressure Injury: other (see comments)  -MD Additional Comments: From fall pre-admission  -MD    Row Name             Wound 03/02/20 1621 Left lateral hip Incision    Wound - Properties Group Date first assessed: 03/02/20  -ME Time first assessed: 1621  -ME Side: Left  -ME Orientation: lateral  -ME Location: hip  -ME Primary Wound Type: Incision  -ME Additional Comments: prineo, telfa, tegaderms, abduction pillow  -ME    Row Name 03/04/20 0913          Plan of Care Review    Plan of Care Reviewed With  patient;grandchild(vitor)  -     Outcome Summary  PT Evaluation  Complete: Patient is dependent x 2 for rolling, supine to/from sit and scooting in bed. She initially is dependent to maintain static sitting balance  with significant right lateral lean with periods of min A. Patient more alert today, oriented to person only however significantly confused. She follows approximately 10% of one step commands with significant cues. Patient would benefit from skilled PT services to address deficits in functional mobility. Recommend SNF at discharge with potential to transition to LTC due to cognitive status.   -BP     Row Name 03/04/20 0913          Physical Therapy Clinical Impression    Date of Referral to PT  03/02/20  -BP     PT Diagnosis (PT Clinical Impression)  Decreased functional mobility   -BP     Criteria for Skilled Interventions Met (PT Clinical Impression)  yes;treatment indicated  -BP     Rehab Potential (PT Clinical Summary)  good, to achieve stated therapy goals  -BP     Predicted Duration of Therapy (PT)  5 days   -BP     Care Plan Review (PT)  evaluation/treatment results reviewed;care plan/treatment goals reviewed;risks/benefits reviewed  -BP     Care Plan Review, Other Participant (PT Clinical Impression)  family  -BP     Patient/Family Concerns, Anticipated Discharge Disposition (PT)  SNF with possible transition to LTC   -BP     Row Name 03/04/20 0913          Physical Therapy Goals    Bed Mobility Goal Selection (PT)  bed mobility, PT goal 1  -BP     Transfer Goal Selection (PT)  transfer, PT goal 1;transfer, PT goal 2  -BP     Row Name 03/04/20 0913          Bed Mobility Goal 1 (PT)    Activity/Assistive Device (Bed Mobility Goal 1, PT)  bed mobility activities, all  -BP     [place] Level/Cues Needed (Bed Mobility Goal 1, PT)  moderate assist (50-74% patient effort);2 person assist  -BP     Time Frame (Bed Mobility Goal 1, PT)  5 days  -BP     Progress/Outcomes (Bed Mobility Goal 1, PT)  goal ongoing  -BP     Row Name 03/04/20 0913          Transfer Goal 1 (PT)    Activity/Assistive Device (Transfer Goal 1, PT)  sit-to-stand/stand-to-sit;walker, rolling  -BP     [place] Level/Cues Needed (Transfer  Goal 1, PT)  moderate assist (50-74% patient effort);2 person assist  -BP     Time Frame (Transfer Goal 1, PT)  5 days  -BP     Progress/Outcome (Transfer Goal 1, PT)  goal ongoing  -BP     Row Name 03/04/20 0913          Transfer Goal 2 (PT)    Activity/Assistive Device (Transfer Goal 2, PT)  bed-to-chair/chair-to-bed;walker, rolling  -BP     [place] Level/Cues Needed (Transfer Goal 2, PT)  maximum assist (25-49% patient effort);2 person assist  -BP     Time Frame (Transfer Goal 2, PT)  5 days  -BP     Progress/Outcome (Transfer Goal 2, PT)  goal ongoing  -BP     Row Name 03/04/20 0913          Positioning and Restraints    Pre-Treatment Position  in bed  -BP     Post Treatment Position  bed  -BP     In Bed  with nsg;call light within reach;encouraged to call for assist  -BP       User Key  (r) = Recorded By, (t) = Taken By, (c) = Cosigned By    Initials Name Provider Type    BP Alida Brayn, PT Physical Therapist    Clary Coulter RN Registered Nurse    Jackelyn Kwon RN Registered Nurse          PT Recommendation and Plan  Anticipated Discharge Disposition (PT): skilled nursing facility, extended care facility  Planned Therapy Interventions (PT Eval): bed mobility training, gait training, patient/family education, strengthening, transfer training  Therapy Frequency (PT Clinical Impression): daily  Outcome Summary/Treatment Plan (PT)  Anticipated Discharge Disposition (PT): skilled nursing facility, extended care facility  Patient/Family Concerns, Anticipated Discharge Disposition (PT): SNF with possible transition to LTC   Plan of Care Reviewed With: patient  Outcome Summary: PT Evaluation  Complete: Patient is dependent x 2 for rolling, supine to/from sit and scooting in bed. She initially is dependent to maintain static sitting balance with significant right lateral lean with periods of min A. Patient more alert today, oriented to person only however significantly confused. She follows  approximately 10% of one step commands with significant cues. Patient would benefit from skilled PT services to address deficits in functional mobility. Recommend SNF at discharge with potential to transition to LTC due to cognitive status.  Outcome Measures     Row Name 03/04/20 1100 03/04/20 0914 03/04/20 0913       How much help from another person do you currently need...    Turning from your back to your side while in flat bed without using bedrails?  --  --  1  -BP    Moving from lying on back to sitting on the side of a flat bed without bedrails?  --  --  1  -BP    Moving to and from a bed to a chair (including a wheelchair)?  --  --  1  -BP    Standing up from a chair using your arms (e.g., wheelchair, bedside chair)?  --  --  1  -BP    Climbing 3-5 steps with a railing?  --  --  1  -BP    To walk in hospital room?  --  --  1  -BP    AM-PAC 6 Clicks Score (PT)  --  --  6  -BP       How much help from another is currently needed...    Putting on and taking off regular lower body clothing?  --  1  -EN  --    Bathing (including washing, rinsing, and drying)  --  1  -EN  --    Toileting (which includes using toilet bed pan or urinal)  --  1  -EN  --    Putting on and taking off regular upper body clothing  --  1  -EN  --    Taking care of personal grooming (such as brushing teeth)  --  2  -EN  --    Eating meals  --  2  -EN  --    AM-PAC 6 Clicks Score (OT)  --  8  -EN  --       Functional Assessment    Outcome Measure Options  AM-PAC 6 Clicks Daily Activity (OT)  -EN  --  AM-PAC 6 Clicks Basic Mobility (PT)  -BP      User Key  (r) = Recorded By, (t) = Taken By, (c) = Cosigned By    Initials Name Provider Type    EN Concepcion Enriquez OTR Occupational Therapist    BP Alida Bryan, PT Physical Therapist         Time Calculation:   PT Charges     Row Name 03/04/20 1527             Time Calculation    Start Time  0913  -BP        User Key  (r) = Recorded By, (t) = Taken By, (c) = Cosigned By    Initials  Name Provider Type    BP Alida Bryan, PT Physical Therapist        Therapy Charges for Today     Code Description Service Date Service Provider Modifiers Qty    87372264951 HC PT EVAL MOD COMPLEXITY 3 3/4/2020 Alida rByan, PT GP 1          PT G-Codes  Outcome Measure Options: AM-PAC 6 Clicks Daily Activity (OT)  AM-PAC 6 Clicks Score (PT): 6  AM-PAC 6 Clicks Score (OT): 8      Alida Bryan, PT  3/4/2020

## 2020-03-04 NOTE — PLAN OF CARE
Problem: Patient Care Overview  Goal: Plan of Care Review  Outcome: Ongoing (interventions implemented as appropriate)  Flowsheets (Taken 3/4/2020 0603)  Plan of Care Reviewed With: patient; family  Note:   Pt oriented this shift. Did become fidgety prn meds given. Pain meds prn. Family at BS this shift. Dressing C/D/I small bruising around it.

## 2020-03-05 LAB
ANION GAP SERPL CALCULATED.3IONS-SCNC: 10.3 MMOL/L (ref 5–15)
BASOPHILS # BLD AUTO: 0.02 10*3/MM3 (ref 0–0.2)
BASOPHILS NFR BLD AUTO: 0.2 % (ref 0–1.5)
BUN BLD-MCNC: 12 MG/DL (ref 8–23)
BUN/CREAT SERPL: 21.8 (ref 7–25)
CALCIUM SPEC-SCNC: 8.2 MG/DL (ref 8.6–10.5)
CHLORIDE SERPL-SCNC: 100 MMOL/L (ref 98–107)
CO2 SERPL-SCNC: 28.7 MMOL/L (ref 22–29)
CREAT BLD-MCNC: 0.55 MG/DL (ref 0.57–1)
DEPRECATED RDW RBC AUTO: 41.1 FL (ref 37–54)
EOSINOPHIL # BLD AUTO: 0.07 10*3/MM3 (ref 0–0.4)
EOSINOPHIL NFR BLD AUTO: 0.7 % (ref 0.3–6.2)
ERYTHROCYTE [DISTWIDTH] IN BLOOD BY AUTOMATED COUNT: 12.1 % (ref 12.3–15.4)
GFR SERPL CREATININE-BSD FRML MDRD: 104 ML/MIN/1.73
GLUCOSE BLD-MCNC: 123 MG/DL (ref 65–99)
HCT VFR BLD AUTO: 26.4 % (ref 34–46.6)
HGB BLD-MCNC: 8.9 G/DL (ref 12–15.9)
IMM GRANULOCYTES # BLD AUTO: 0.06 10*3/MM3 (ref 0–0.05)
IMM GRANULOCYTES NFR BLD AUTO: 0.6 % (ref 0–0.5)
LYMPHOCYTES # BLD AUTO: 1.47 10*3/MM3 (ref 0.7–3.1)
LYMPHOCYTES NFR BLD AUTO: 14.9 % (ref 19.6–45.3)
MCH RBC QN AUTO: 31.6 PG (ref 26.6–33)
MCHC RBC AUTO-ENTMCNC: 33.7 G/DL (ref 31.5–35.7)
MCV RBC AUTO: 93.6 FL (ref 79–97)
MONOCYTES # BLD AUTO: 0.76 10*3/MM3 (ref 0.1–0.9)
MONOCYTES NFR BLD AUTO: 7.7 % (ref 5–12)
NEUTROPHILS # BLD AUTO: 7.49 10*3/MM3 (ref 1.7–7)
NEUTROPHILS NFR BLD AUTO: 75.9 % (ref 42.7–76)
NRBC BLD AUTO-RTO: 0 /100 WBC (ref 0–0.2)
PLATELET # BLD AUTO: 163 10*3/MM3 (ref 140–450)
PMV BLD AUTO: 11.7 FL (ref 6–12)
POTASSIUM BLD-SCNC: 2.9 MMOL/L (ref 3.5–5.2)
RBC # BLD AUTO: 2.82 10*6/MM3 (ref 3.77–5.28)
SODIUM BLD-SCNC: 139 MMOL/L (ref 136–145)
WBC NRBC COR # BLD: 9.87 10*3/MM3 (ref 3.4–10.8)

## 2020-03-05 PROCEDURE — 97110 THERAPEUTIC EXERCISES: CPT

## 2020-03-05 PROCEDURE — 97535 SELF CARE MNGMENT TRAINING: CPT

## 2020-03-05 PROCEDURE — 80048 BASIC METABOLIC PNL TOTAL CA: CPT | Performed by: INTERNAL MEDICINE

## 2020-03-05 PROCEDURE — 85025 COMPLETE CBC W/AUTO DIFF WBC: CPT | Performed by: INTERNAL MEDICINE

## 2020-03-05 PROCEDURE — 94799 UNLISTED PULMONARY SVC/PX: CPT

## 2020-03-05 PROCEDURE — 25010000002 ENOXAPARIN PER 10 MG: Performed by: ORTHOPAEDIC SURGERY

## 2020-03-05 PROCEDURE — 25010000002 ONDANSETRON PER 1 MG: Performed by: ORTHOPAEDIC SURGERY

## 2020-03-05 PROCEDURE — 99232 SBSQ HOSP IP/OBS MODERATE 35: CPT | Performed by: INTERNAL MEDICINE

## 2020-03-05 RX ORDER — POTASSIUM CHLORIDE 7.45 MG/ML
10 INJECTION INTRAVENOUS
Status: DISCONTINUED | OUTPATIENT
Start: 2020-03-05 | End: 2020-03-07 | Stop reason: HOSPADM

## 2020-03-05 RX ORDER — POTASSIUM CHLORIDE 20 MEQ/1
40 TABLET, EXTENDED RELEASE ORAL AS NEEDED
Status: DISCONTINUED | OUTPATIENT
Start: 2020-03-05 | End: 2020-03-07 | Stop reason: HOSPADM

## 2020-03-05 RX ORDER — POTASSIUM CHLORIDE 1.5 G/1.77G
40 POWDER, FOR SOLUTION ORAL AS NEEDED
Status: DISCONTINUED | OUTPATIENT
Start: 2020-03-05 | End: 2020-03-07 | Stop reason: HOSPADM

## 2020-03-05 RX ORDER — LORAZEPAM 0.5 MG/1
0.5 TABLET ORAL DAILY
Status: DISCONTINUED | OUTPATIENT
Start: 2020-03-06 | End: 2020-03-07 | Stop reason: HOSPADM

## 2020-03-05 RX ADMIN — POTASSIUM CHLORIDE 40 MEQ: 1500 TABLET, EXTENDED RELEASE ORAL at 21:55

## 2020-03-05 RX ADMIN — SENNOSIDES AND DOCUSATE SODIUM 2 TABLET: 8.6; 5 TABLET ORAL at 21:56

## 2020-03-05 RX ADMIN — BRIMONIDINE TARTRATE 1 DROP: 2 SOLUTION/ DROPS OPHTHALMIC at 21:58

## 2020-03-05 RX ADMIN — POTASSIUM CHLORIDE 40 MEQ: 1500 TABLET, EXTENDED RELEASE ORAL at 12:18

## 2020-03-05 RX ADMIN — HYDROCODONE BITARTRATE AND ACETAMINOPHEN 1 TABLET: 7.5; 325 TABLET ORAL at 23:28

## 2020-03-05 RX ADMIN — LISINOPRIL 20 MG: 20 TABLET ORAL at 09:06

## 2020-03-05 RX ADMIN — CETIRIZINE HYDROCHLORIDE 10 MG: 10 TABLET, FILM COATED ORAL at 09:05

## 2020-03-05 RX ADMIN — BRIMONIDINE TARTRATE 1 DROP: 2 SOLUTION/ DROPS OPHTHALMIC at 09:05

## 2020-03-05 RX ADMIN — ONDANSETRON 4 MG: 2 INJECTION, SOLUTION INTRAMUSCULAR; INTRAVENOUS at 18:08

## 2020-03-05 RX ADMIN — TIMOLOL MALEATE 1 DROP: 5 SOLUTION/ DROPS OPHTHALMIC at 21:57

## 2020-03-05 RX ADMIN — MELATONIN TAB 5 MG 10 MG: 5 TAB at 21:55

## 2020-03-05 RX ADMIN — DORZOLAMIDE HCL 1 DROP: 20 SOLUTION/ DROPS OPHTHALMIC at 09:05

## 2020-03-05 RX ADMIN — DILTIAZEM HYDROCHLORIDE 30 MG: 30 TABLET, FILM COATED ORAL at 21:55

## 2020-03-05 RX ADMIN — SENNOSIDES AND DOCUSATE SODIUM 2 TABLET: 8.6; 5 TABLET ORAL at 09:05

## 2020-03-05 RX ADMIN — NYSTATIN 500000 UNITS: 500000 SUSPENSION ORAL at 22:05

## 2020-03-05 RX ADMIN — FLUTICASONE PROPIONATE 2 SPRAY: 50 SPRAY, METERED NASAL at 09:06

## 2020-03-05 RX ADMIN — PANTOPRAZOLE SODIUM 40 MG: 40 TABLET, DELAYED RELEASE ORAL at 07:38

## 2020-03-05 RX ADMIN — LABETALOL HYDROCHLORIDE 10 MG: 5 INJECTION INTRAVENOUS at 23:27

## 2020-03-05 RX ADMIN — LORAZEPAM 2 MG: 1 TABLET ORAL at 21:56

## 2020-03-05 RX ADMIN — DILTIAZEM HYDROCHLORIDE 30 MG: 30 TABLET, FILM COATED ORAL at 09:05

## 2020-03-05 RX ADMIN — DORZOLAMIDE HCL 1 DROP: 20 SOLUTION/ DROPS OPHTHALMIC at 21:58

## 2020-03-05 RX ADMIN — LATANOPROST 1 DROP: 50 SOLUTION OPHTHALMIC at 21:58

## 2020-03-05 RX ADMIN — LABETALOL HYDROCHLORIDE 10 MG: 5 INJECTION INTRAVENOUS at 07:38

## 2020-03-05 RX ADMIN — TIMOLOL MALEATE 1 DROP: 5 SOLUTION/ DROPS OPHTHALMIC at 09:05

## 2020-03-05 RX ADMIN — OLANZAPINE 5 MG: 2.5 TABLET, FILM COATED ORAL at 21:54

## 2020-03-05 RX ADMIN — ENOXAPARIN SODIUM 40 MG: 40 INJECTION SUBCUTANEOUS at 09:06

## 2020-03-05 RX ADMIN — LABETALOL HYDROCHLORIDE 10 MG: 5 INJECTION INTRAVENOUS at 18:20

## 2020-03-05 RX ADMIN — SODIUM CHLORIDE, PRESERVATIVE FREE 10 ML: 5 INJECTION INTRAVENOUS at 09:06

## 2020-03-05 RX ADMIN — POTASSIUM CHLORIDE 40 MEQ: 1500 TABLET, EXTENDED RELEASE ORAL at 16:40

## 2020-03-05 RX ADMIN — HYDROCODONE BITARTRATE AND ACETAMINOPHEN 1 TABLET: 7.5; 325 TABLET ORAL at 09:05

## 2020-03-05 RX ADMIN — NYSTATIN 500000 UNITS: 500000 SUSPENSION ORAL at 16:41

## 2020-03-05 RX ADMIN — LEVOTHYROXINE SODIUM 50 MCG: 50 TABLET ORAL at 07:38

## 2020-03-05 RX ADMIN — LORAZEPAM 1 MG: 1 TABLET ORAL at 09:05

## 2020-03-05 RX ADMIN — SODIUM CHLORIDE, PRESERVATIVE FREE 10 ML: 5 INJECTION INTRAVENOUS at 21:56

## 2020-03-05 NOTE — PROGRESS NOTES
POD# 3 s/p left hip bipolar hemiarthroplasty for treatment of femoral neck fracture    Subjective: Paulo Hampton resting in bed this am, states pain fairly well controlled until she attempts to stand. Remains confused when attempting to work with PT, requiring max assist x 2. Denies presence of numbness or tingling left lower extremity.     Objective:  Vitals:    03/04/20 2327 03/05/20 0641 03/05/20 0905 03/05/20 1100   BP: 171/68 170/71  132/76   BP Location: Left arm Left arm  Left arm   Patient Position: Lying Lying  Sitting   Pulse: 84 89 76 72   Resp: 18 18  18   Temp: 100.3 °F (37.9 °C) 99.2 °F (37.3 °C)  99 °F (37.2 °C)   TempSrc: Axillary Axillary  Oral   SpO2: 90% 96%  97%   Weight:       Height:           Results from last 7 days   Lab Units 03/05/20  0523 03/04/20  0528 03/03/20  0546 02/29/20  2112   WBC 10*3/mm3 9.87  --   --  10.83*   HEMOGLOBIN g/dL 8.9* 8.9* 10.5* 13.5   HEMATOCRIT % 26.4* 25.8* 31.2* 40.9   PLATELETS 10*3/mm3 163  --   --  175       Results from last 7 days   Lab Units 03/05/20  0523 03/03/20  0546 02/29/20  2112   SODIUM mmol/L 139 138 140   POTASSIUM mmol/L 2.9* 3.4* 3.6   CHLORIDE mmol/L 100 100 103   CO2 mmol/L 28.7 27.4 23.0   BUN mg/dL 12 12 19   CREATININE mg/dL 0.55* 0.57 0.80   GLUCOSE mg/dL 123* 112* 118*   CALCIUM mg/dL 8.2* 8.4* 9.5       Exam:    Left lower extremity   Dressing clean/dry/intact  Compartment soft and easily compressible  Brisk cap refill all digits left foot  2+ dorsalis pedis pulse  Moderate ecchymosis dorsum foot  Flex extend all digits left foot  Plantarflex/dorsiflex ankle    Impression: s/p left hip bipolar hemiarthroplasty for treatment     Plan:  1. PT/OT- weightbearing as tolerated left lower extremity, posterior hip precautions 6 weeks  2. Pain control- Norco  3. DVT prophylaxis- Lovenox  4. Wound care- dressing remain intact until follow-up in office  5. Disposition- short term rehab with potential to transition to long-term care if needed  per PT OT; discharge per hospitalist

## 2020-03-05 NOTE — PLAN OF CARE
Problem: Patient Care Overview  Goal: Plan of Care Review  3/5/2020 1830 by Anna Smith RN  Outcome: Ongoing (interventions implemented as appropriate)  Flowsheets (Taken 3/5/2020 1830)  Progress: no change  Plan of Care Reviewed With: patient; family  Outcome Summary: Multiple loose BM today, external cath removed due to having to replace it multiple times. Confused. Needs encouragement to eat. K+ replacement. Nystatin for complaints of mouth pain and dark tongue. Family at bedside.

## 2020-03-05 NOTE — PLAN OF CARE
Problem: Patient Care Overview  Goal: Plan of Care Review  Flowsheets (Taken 3/5/2020 0838 by John Irwin, OTR)  Outcome Summary: OT: Pt tolerated therapy better this am. She demonstrated better sitting balance this am and was able to stand with support of therapist (max assist). Patient oriented x1 and remains confused within session.  Patient performs supine to sit with max assist x2 and sit to stand with max assist x2 with additional staff member to assist.  Patient unable to tolerate fully upright posture and difficulty maintaining standing balance. Pt did not attempt adl activity due to confusion and physical limitations. Rec pt go to rehab facility with 24hr care untill hip precautions are removed with potential to transition to long term care if needed.

## 2020-03-05 NOTE — PLAN OF CARE
Problem: Patient Care Overview  Goal: Plan of Care Review  Outcome: Ongoing (interventions implemented as appropriate)  Flowsheets  Taken 3/5/2020 0458  Progress: no change  Outcome Summary: pt has more restlessness and confused at night, continues on 02 at 2 liters n/c, resting at this time  Taken 3/4/2020 2000  Plan of Care Reviewed With: patient

## 2020-03-05 NOTE — PROGRESS NOTES
Adult Nutrition  Assessment/PES    Patient Name:  Paulo Hampton  YOB: 1930  MRN: [id number]  Admit Date:  2/29/2020    Assessment Date:  3/5/2020    Comments:  Pt ate 50%, improvement today. Encourage po and Boost Plus. Will cont to follow.    Reason for Assessment     Row Name 03/05/20 1316          Reason for Assessment    Reason For Assessment  per organizational policy LOS     Diagnosis  trauma s/p hip fx repair , ankle sprain, met [facility]         Nutrition/Diet History     Row Name 03/05/20 1317          Nutrition/Diet History    Typical Food/Fluid Intake  Pt alone at visit, grabbing at shoulder, helped move gown up on shoulder. Pt somewhat garbled speech, reports she te eggs this am. NKFA. Pt is [place]. No family present at visit. Per rounds likely rehab/LTC         Anthropometrics     Row Name 03/05/20 1318          Anthropometrics    Weight  61.6 kg (135 lb 12.9 oz)        Usual Body Weight (UBW)    Weight Loss Time Frame  9/2019 143# BSW, current wt is 7.2# below this wt which is 5% loss in 6 months         Body Mass Index (BMI)    BMI Assessment  BMI 18.5-24.9: normal         Labs/Tests/Procedures/Meds     Row Name 03/05/20 1324          Labs/Procedures/Meds    Lab Results Reviewed  reviewed     Lab Results Comments  K 2. 9 L         Diagnostic Tests/Procedures    Diagnostic Test/Procedure Reviewed  reviewed        Medications    Pertinent Medications Reviewed  reviewed     Pertinent Medications Comments  melatonin         Physical Findings     Row Name 03/05/20 1324          Physical Findings    Skin  surgical incision;other (see comments) elbow tear         Estimated/Assessed Needs     Row Name 03/05/20 1324          Estimated/Assessed Needs    Additional Documentation  Calorie Requirements (Group);Protein Requirements (Group);Fluid Requirements (Group)        Calorie Requirements    Estimated Calorie Need Method  Davian Acevedo     Estimated Calorie Requirement Comment  1194 kcal (  mifflin 1. 2)         Protein Requirements    Est Protein Requirement Amount (gms/kg)  1.1 gm protein 68 gm pro        Fluid Requirements    Estimated Fluid Requirement Method  RDA Method 1194 ml         Nutrition Prescription Ordered     Row Name 03/05/20 1325          Nutrition Prescription PO    Current PO Diet  Regular     Supplement  Boost Plus (Ensure Enlive, Ensure Plus)     Supplement Frequency  3 times a day         Evaluation of Received Nutrient/Fluid Intake     Row Name 03/05/20 1325          Fluid Intake Evaluation    Oral Fluid (mL)  360 insufficient data        PO Evaluation    % PO Intake  25% x 4 meals, 25% x 2 supplements               Problem/Interventions:  Problem 1     Row Name 03/05/20 1326          Nutrition Diagnoses Problem 1    Problem 1  Inadequate Intake/Infusion     Inadequate Intake Type  Oral     Etiology (related to)  Medical Diagnosis;Factors Affecting Nutrition     Signs/Symptoms (evidenced by)  PO Intake               Intervention Goal     Row Name 03/05/20 1327          Intervention Goal    General  Meet nutritional needs for age/condition     PO  Increase intake;PO intake (%)     PO Intake %  50 % or greater * needs are low & on reg diet     Weight  Maintain weight         Nutrition Intervention     Row Name 03/05/20 1328          Nutrition Intervention    RD/Tech Action  Encourage intake;Follow Tx progress           Education/Evaluation     Row Name 03/05/20 1328          Education    Education  Education offered and refused Oriented to self only        Monitor/Evaluation    Monitor  Per protocol;I&O;PO intake;Supplement intake;Pertinent labs;Weight;Symptoms           Electronically signed by:  Jeannie Glynn RD  03/05/20 1:29 PM

## 2020-03-05 NOTE — THERAPY TREATMENT NOTE
Inpatient Rehabilitation - Occupational Therapy Treatment Note   Dania Boland     Patient Name: Paulo Hampton  : 1930  MRN: [id number]  Today's Date: 3/5/2020  Onset of Illness/Injury or Date of Surgery: 20(surgery 3/2/20)  Date of Referral to OT: 20  Referring Physician: Dr. Dickinson    Admit Date: 2020       ICD-10-CM ICD-9-CM   1. Closed fracture of left hip, initial encounter (CMS/[facility]) S72.002A 820.8   2. Fall, initial encounter W19.XXXA E888.9   3. Skin avulsion T14.8XXA 879.8   4. 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 difficile colitis    • Diarrhea    • GERD (gastroesophageal reflux disease)    • Hyperlipidemia    • Hypertension    • Hypothyroid    • Presbyesophagus    • Sinus congestion    • Thrush      Past Surgical History:   Procedure Laterality Date   • ABDOMINAL SURGERY     • APPENDECTOMY     • CHOLECYSTECTOMY     • ENDOSCOPY N/A 2017    Procedure: ESOPHAGOGASTRODUODENOSCOPY WITH DILATION;  Surgeon: Betito Montejo MD;  Location:  [facility] OR;  Service:    • HIP HEMIARTHROPLASTY Left 3/2/2020    Procedure: HIP HEMIARTHROPLASTY and all associated procedures;  Surgeon: Jeff Dickinson MD;  Location:  [facility] OR;  Service: Orthopedics;  Laterality: Left;   • HYSTERECTOMY     • KNEE ARTHROSCOPY Right    • THYROIDECTOMY         Therapy Treatment    Rehabilitation Treatment Summary     Row Name 20 0838 20 0815           Treatment Time/Intention    Discipline  occupational therapist  -SD,GC,SD2  physical therapist  -     Document Type  therapy note (daily note)  -SD,GC,SD2  therapy note (daily note)  -JW     Subjective Information  complains of;pain  -SD,GC,SD2  -- pt confused throughout treatment  -JW     Mode of Treatment  occupational therapy  -SD,GC,SD2  physical therapy  -JW     Patient/Family Observations  pt supine, agrees to therapy.  family present during portion of treatment  -SD,GC,SD2  pt supine, agrees to therapy.  family present during portion of treatment  -JW     Care Plan Review  risks/benefits reviewed;care plan/treatment goals reviewed;patient/other agree to care plan  -SD,GC,SD2  care plan/treatment goals reviewed;risks/benefits reviewed;patient/other agree to care plan  -JW     Patient Effort  fair  -SD,GC,SD2  fair  -JW     Existing Precautions/Restrictions  fall;hip, posterior  -SD,GC,SD2  fall;hip, posterior  -JW     Treatment Considerations/Comments  Pt confused and has difficulty following one step commands  -SD,GC,SD2  pt with difficulty consistently following commands during session.  -JW     Recorded by [SD,GC,SD2] John Irwin, OTR (r) Conchis Crawford, OT Student (t) John Irwin, OTR (c) 03/05/20 1549 [JW] Rosa Cuenca, PT 03/05/20 1144     Row Name 03/05/20 0838 03/05/20 0815          Cognitive Assessment/Intervention- PT/OT    Orientation Status (Cognition)  person;oriented to  -SD  oriented to;person  -JW     Follows Commands (Cognition)  repetition of directions required;0-24% accuracy  -SD2,GC,SD3  -- requires repeated cues for command following  -     Safety Deficit (Cognitive)  severe deficit;ability to follow commands;awareness of need for assistance;insight into deficits/self awareness;judgment;problem solving;safety precautions awareness  -SD2,GC,SD3  severe deficit;ability to follow commands;awareness of need for assistance;insight into deficits/self  awareness;judgment;problem solving;safety precautions awareness  -JW     Personal Safety Interventions  gait belt;nonskid shoes/slippers when out of bed  -SD2,GC,SD3  gait belt;nonskid shoes/slippers when out of bed  -JW     Recorded by [SD] John Irwin OTR 03/05/20 1550  [SD2,GC,SD3] John Irwin OTR (r) Conchis Crawford, OT Student (t) John Irwin OTR (c) 03/05/20 1549 [JW] Rosa Cuenca, PT 03/05/20 1144     Row Name 03/05/20 0838 03/05/20 0815          Bed Mobility Assessment/Treatment    Supine-Sit [place] (Bed Mobility)  maximum assist (25% patient effort);2 person assist;verbal cues  -SD,GC,SD2  maximum assist (25% patient effort);2 person assist;verbal cues  -JW     Sit-Supine [place] (Bed Mobility)  dependent (less than 25% patient effort);2 person assist;verbal cues  -SD,GC,SD2  dependent (less than 25% patient effort);2 person assist;verbal cues  -JW     Assistive Device (Bed Mobility)  bed rails;head of bed elevated;draw sheet  -SD,GC,SD2  bed rails;head of bed elevated;draw sheet  -JW     Comment (Bed Mobility)  requires significant cues and assist to obtain sitting balance at EOB  -SD,GC,SD2  requires significant cues and assist to obtain sitting balance at EOB  -JW     Recorded by [SD,GC,SD2] John Irwin OTR (r) Conchis Crawofrd, OT Student (t) John Irwin OTR (c) 03/05/20 1549 [JW] Rosa Cuenca, PT 03/05/20 1144     Row Name 03/05/20 0838 03/05/20 0815          Transfer Assessment/Treatment    Transfer Assessment/Treatment  sit-stand transfer;stand-sit transfer  -SD,GC,SD2  sit-stand transfer;stand-sit transfer  -JW     Comment (Transfers)  Patient performs transfer from elevated bed surface with significant assistance for upright posture and proper midline orientation  -SD,GC,SD2  Patient performs transfer from elevated bed surface with significant assistance for upright posture and proper midline orientation  -JW     Recorded by [SD,GC,SD2] Deburger,  NELDA Lopez (r) Conchis Crawford, OT Student (t) John Irwin OTR (c) 03/05/20 1549 [JW] Bang Rosa, PT 03/05/20 1144     Row Name 03/05/20 0838 03/05/20 0815          Sit-Stand Transfer    Sit-Stand [place] (Transfers)  maximum assist (25% patient effort);2 person assist;verbal cues  -SD,GC,SD2  maximum assist (25% patient effort);2 person assist;verbal cues 3rd staff member required for safety  -JW     Assistive Device (Sit-Stand Transfers)  walker, front-wheeled  -SD,GC,SD2  walker, front-wheeled  -JW     Recorded by [SD,GC,SD2] John Irwin OTR (r) Conchis Crawford, OT Student (t) John Irwin OTSOCORRO (c) 03/05/20 1549 [JW] Rosa Cuenca, PT 03/05/20 1144     Row Name 03/05/20 0838 03/05/20 0815          Stand-Sit Transfer    Stand-Sit [place] (Transfers)  maximum assist (25% patient effort);2 person assist;verbal cues  -SD,GC,SD2  maximum assist (25% patient effort);2 person assist;verbal cues  -JW     Assistive Device (Stand-Sit Transfers)  walker, front-wheeled  -SD,GC,SD2  walker, front-wheeled  -JW     Recorded by [SD,GC,SD2] John Irwin OTR (r) Conchis Crawford, OT Student (t) John Irwin OTSOCORRO (c) 03/05/20 1549 [JW] Rosa Cuenca, PT 03/05/20 1144     Row Name 03/05/20 0815             Gait/Stairs Assessment/Training    Comment (Gait/Stairs)  unable to attempt at this time  -JW      Recorded by [JW] Rosa Cuenca, PT 03/05/20 1144      Row Name 03/05/20 0838             BADL Safety/Performance    Impairments, BADL Safety/Performance  cognition;pain;balance;strength  -SD,GC,SD2      Cognitive Impairments, BADL Safety/Performance  problem solving/reasoning;safety precaution awareness;safety precaution follow-through  -SD,GC,SD2      Skilled BADL Treatment/Intervention  cognitive/safety deficit modifications  -SD,GC,SD2      Recorded by [SD,FAISAL,SD2] John Irwin, TANYAR (r) Conchis Crawford OT Student (t) John Irwin, TANYAR (c) 03/05/20 1549      Row Name  03/05/20 0838 03/05/20 0815          Static Sitting Balance    Level of [place] (Unsupported Sitting, Static Balance)  other (see comments) asisstance level varied from CGA to max  -SD  --     Time Able to Maintain Position (Unsupported Sitting, Static Balance)  -- sat at EOB >5 min, CGA approx 1-2 min  -SD  --     Comment (Unsupported Sitting, Static Balance)  pt with intermittent periods of significant right lateral lean.  assistance levels vary from CGA to max assist for static sitting balance, requires significant assistance to correct to proper midline.  -SD2,GC,SD3  pt with intermittent periods of significant right lateral lean.  assistance levels vary from CGA to max assist for static sitting balance, requires significant assistance to correct to proper midline.  -JW     Recorded by [SD] John Irwin OTR 03/05/20 1550  [SD2,GC,SD3] John Irwin OTR (r) Conchis Crawford, OT Student (t) John Irwin OTR (c) 03/05/20 1549 [JW] Rosa Cuenca, PT 03/05/20 1144     Row Name 03/05/20 0838 03/05/20 0815          Positioning and Restraints    Pre-Treatment Position  in bed  -SD,GC,SD2  in bed  -JW     Post Treatment Position  bed  -SD,GC,SD2  bed  -JW     In Bed  supine;call light within reach;encouraged to call for assist  -SD,GC,SD2  supine;call light within reach;encouraged to call for assist;exit alarm on;with family/caregiver  -JW     Recorded by [SD,GC,SD2] John Irwin OTR (r) Conchis Crawford, OT Student (t) John Irwin OTR (c) 03/05/20 1549 [JW] Rosa Cuenca, PT 03/05/20 1144     Row Name 03/05/20 0838 03/05/20 0815          Pain Scale: Numbers Pre/Post-Treatment    Pre/Post Treatment Pain Comment  Pt did not rate pain  -SD,GC,SD2  -- pt unable to rate pain due to cognition  -JW     Recorded by [SD,GC,SD2] John Irwin OTR (r) Conchis Crawford, OT Student (t) John Irwin, OTR (c) 03/05/20 1549 [JW] Rosa Cuenca, PT 03/05/20 1144     Row Name                 Wound 03/01/20 0907 Left elbow Skin Tear    Wound - Properties Group Date first assessed: 03/01/20 [MD] Time first assessed: 0907 [MD] Present on Hospital Admission: Y [MD] Side: Left [MD] Location: elbow [MD] Primary Wound Type: Skin tear [MD] Stage, Pressure Injury: other (see comments) [MD] Additional Comments: From fall pre-admission [MD] Recorded by:  [MD] Jackelyn Jean RN 03/01/20 0908    Row Name                Wound 03/02/20 1621 Left lateral hip Incision    Wound - Properties Group Date first assessed: 03/02/20 [ME] Time first assessed: 1621 [ME] Side: Left [ME] Orientation: lateral [ME] Location: hip [ME] Primary Wound Type: Incision [ME] Additional Comments: prineo, telfa, tegaderms, abduction pillow [ME] Recorded by:  [ME] Clary Asif RN 03/02/20 1621    Row Name 03/05/20 0838 03/05/20 0815          Plan of Care Review    Plan of Care Reviewed With  patient  -SD,GC,SD2  patient  -JW     Outcome Summary  OT: Pt tolerated therapy better this am. She demonstrated better sitting balance this am and was able to stand with support of therapist (max assist). Patient oriented x1 and remains confused within session.  Patient performs supine to sit with max assist x2 and sit to stand with max assist x2 with additional staff member to assist.  Patient unable to tolerate fully upright posture and difficulty maintaining standing balance. Pt did not attempt adl activity due to confusion and physical limitations. Rec pt go to rehab facility with 24hr care untill hip precautions are removed with potential to transition to long term care if needed.   -SD3  PT: Patient oriented x1 and remains confused within session.  Patient performs supine to sit with max assist x2 and sit to stand with max assist x2 with additional staff member to assist.  Patient unable to tolerate fully upright posture and difficulty maintaining standing balance.  Patient unable to take steps at this time.  Due to current physical and  cognitive status, recommend further rehab in facility where patient can remain until hip precautions are removed with potential to transition to long term care if needed.    -JW     Recorded by [SD,GC,SD2] John Irwin OTR (r) Conchis Crawford OT Student (t) John Irwin, OTR (c) 03/05/20 1549  [SD3] John Irwin OTR 03/05/20 1550 [JW] Rosa Cuenca, PT 03/05/20 1144     Row Name 03/05/20 0838             Outcome Summary/Treatment Plan (OT)    Daily Summary of Progress (OT)  progress toward functional goals is gradual  -SD,GC,SD2      Plan for Continued Treatment (OT)  cont plan  -SD,GC,SD2      Recorded by [SD,GC,SD2] John Irwin OTR (r) Conchis Crawford OT Student (t) John Irwin, OTR (c) 03/05/20 1549      Row Name 03/05/20 0815             Outcome Summary/Treatment Plan (PT)    Daily Summary of Progress (PT)  progress towards functional goals is fair  -JW      Anticipated Discharge Disposition (PT)  skilled nursing facility;extended care facility  -JW      Recorded by [JW] Rosa Cuenca, PT 03/05/20 1144        User Key  (r) = Recorded By, (t) = Taken By, (c) = Cosigned By    Initials Name Effective Dates Discipline    SD John Irwin, OTR 06/22/16 -  OT    JW Rosa Cuenca, PT 04/03/18 -  PT    Clary Coulter, RN 09/30/16 -  Nurse    Jackelyn Kwon, RN 07/18/19 -  Nurse    Conchis Hogan OT Student 01/08/20 -  OT        Wound 03/01/20 0907 Left elbow Skin Tear (Active)   Dressing Appearance dry;intact 3/5/2020  9:00 AM   Closure Open to air 3/5/2020  9:00 AM   Base dry;scab 3/5/2020  9:00 AM   Drainage Amount none 3/5/2020  9:00 AM       Wound 03/02/20 1621 Left lateral hip Incision (Active)   Dressing Appearance dry;intact;no drainage 3/5/2020  9:00 AM   Closure HEATH 3/5/2020  9:00 AM   Periwound intact;ecchymotic 3/5/2020  9:00 AM   Drainage Amount none 3/5/2020  9:00 AM     Rehab Goal Summary     Row Name 03/05/20 1500             Transfer Goal 1 (OT)     Activity/Assistive Device (Transfer Goal 1, OT)  sit-to-stand/stand-to-sit;walker, rolling  -SD      [place] Level/Cues Needed (Transfer Goal 1, OT)  maximum assist (25-49% patient effort);2 person assist  -SD      Time Frame (Transfer Goal 1, OT)  by discharge  -SD      Progress/Outcome (Transfer Goal 1, OT)  goal ongoing;continuing progress toward goal  -SD         Balance Goal 1 (OT)    Activity/Assistive Device (Balance Goal 1, OT)  sitting, static  -SD      [place] Level/Cues Needed (Balance Goal 1, OT)  contact guard assist  -SD      Time Frame (Balance Goal 1, OT)  by discharge  -SD      Progress/Outcomes (Balance Goal 1, OT)  goal ongoing;continuing progress toward goal  -SD         Caregiver Training Goal 1 (OT)    Caregiver Training Goal 1 (OT)  Caregivers recall 3/3 hip precautions  -SD      Time Frame (Caregiver Training Goal 1, OT)  2 days  -SD      Progress/Outcomes (Caregiver Training Goal 1, OT)  goal met grand daughter able to recall TH precautions.  -SD        User Key  (r) = Recorded By, (t) = Taken By, (c) = Cosigned By    Initials Name Provider Type Discipline    SD John Irwin, OTR Occupational Therapist OT            OT Recommendation and Plan  Outcome Summary/Treatment Plan (OT)  Daily Summary of Progress (OT): progress toward functional goals is gradual  Plan for Continued Treatment (OT): cont plan  Daily Summary of Progress (OT): progress toward functional goals is gradual  Plan of Care Review  Plan of Care Reviewed With: patient  Plan of Care Reviewed With: patient  Outcome Measures     Row Name 03/05/20 0838 03/05/20 0815 03/04/20 1100       How much help from another person do you currently need...    Turning from your back to your side while in flat bed without using bedrails?  --  1  -JW  --    Moving from lying on back to sitting on the side of a flat bed without bedrails?  --  1  -JW  --    Moving to and from a bed to a chair (including a wheelchair)?  --  1  -JW  --     Standing up from a chair using your arms (e.g., wheelchair, bedside chair)?  --  1  -JW  --    Climbing 3-5 steps with a railing?  --  1  -JW  --    To walk in hospital room?  --  1  -JW  --    AM-[facility] 6 Clicks Score (PT)  --  6  -JW  --       How much help from another is currently needed...    Putting on and taking off regular lower body clothing?  1  -SD (r) GC (t) SD (c)  --  --    Bathing (including washing, rinsing, and drying)  1  -SD (r) GC (t) SD (c)  --  --    Toileting (which includes using toilet bed pan or urinal)  1  -SD (r) GC (t) SD (c)  --  --    Putting on and taking off regular upper body clothing  1  -SD (r) GC (t) SD (c)  --  --    Taking care of personal grooming (such as brushing teeth)  2  -SD (r) GC (t) SD (c)  --  --    Eating meals  2  -SD (r) GC (t) SD (c)  --  --    AM-[facility] 6 Clicks Score (OT)  8  -SD (r) GC (t)  --  --       Functional Assessment    Outcome Measure Options  AM-[facility] 6 Clicks Daily Activity (OT)  -SD (r) GC (t) SD (c)  -[facility] 6 Clicks Basic Mobility (PT)  -[facility] 6 Clicks Daily Activity (OT)  -EN    Row Name 03/04/20 0914 03/04/20 0913          How much help from another person do you currently need...    Turning from your back to your side while in flat bed without using bedrails?  --  1  -BP     Moving from lying on back to sitting on the side of a flat bed without bedrails?  --  1  -BP     Moving to and from a bed to a chair (including a wheelchair)?  --  1  -BP     Standing up from a chair using your arms (e.g., wheelchair, bedside chair)?  --  1  -BP     Climbing 3-5 steps with a railing?  --  1  -BP     To walk in hospital room?  --  1  -BP     AM-[facility] 6 Clicks Score (PT)  --  6  -BP        How much help from another is currently needed...    Putting on and taking off regular lower body clothing?  1  -EN  --     Bathing (including washing, rinsing, and drying)  1  -EN  --     Toileting (which includes using toilet bed pan or urinal)  1  -EN  --     Putting on and  taking off regular upper body clothing  1  -EN  --     Taking care of personal grooming (such as brushing teeth)  2  -EN  --     Eating meals  2  -EN  --     AM-PAC 6 Clicks Score (OT)  8  -EN  --        Functional Assessment    Outcome Measure Options  --  AM-PAC 6 Clicks Basic Mobility (PT)  -BP       User Key  (r) = Recorded By, (t) = Taken By, (c) = Cosigned By    Initials Name Provider Type    EN Concepcion Enriquez, OTR Occupational Therapist    John Vides, OTR Occupational Therapist    BP Alida Bryan, PT Physical Therapist    Rosa Nieves, PT Physical Therapist    GC Conchis Crawford, OT Student OT Student           Time Calculation:   Time Calculation- OT     Row Name 03/05/20 1458             Time Calculation- OT    OT Start Time  0814  -SD (r) GC (t) SD (c)      OT Stop Time  0838  -SD (r) GC (t) SD (c)      OT Time Calculation (min)  24 min  -SD (r) GC (t)        User Key  (r) = Recorded By, (t) = Taken By, (c) = Cosigned By    Initials Name Provider Type    John Vides OTR Occupational Therapist    Conchis Hogan, OT Student OT Student        Therapy Charges for Today     Code Description Service Date Service Provider Modifiers Qty    [id number]  OT SELF CARE/MGMT/TRAIN EA 15 MIN 3/5/2020 Conchis Crawford, OT Student GO 2               Conchis Crawford OT Student  3/5/2020

## 2020-03-05 NOTE — PROGRESS NOTES
"SERVICE: [facility] HOSPITALIST    CHIEF COMPLAINT: hip fx     SUBJECTIVE:    Sleeping but is arousable and awake and alert today seems to be slowly improving with mental status.  She remains intermittently hypoxic mainly with sleeping and is tolerating nasal cannula at 2 L.  No other new issues.    ROS able to be obtained due to patient's mental status      OBJECTIVE:    /76 (BP Location: Left arm, Patient Position: Sitting)   Pulse 72   Temp 99 °F (37.2 °C) (Oral)   Resp 18   Ht 157.5 cm (62\")   Wt 61.6 kg (135 lb 12.9 oz)   SpO2 96%   BMI 24.84 kg/m²     MEDS/LABS REVIEWED AND ORDERED      brimonidine 1 drop Right Eye BID   cetirizine 10 mg Oral Daily   dilTIAZem 30 mg Oral BID   dorzolamide 1 drop Right Eye BID   enoxaparin 40 mg Subcutaneous Daily   fluticasone 2 spray Nasal Daily   labetalol 10 mg Intravenous Q6H   latanoprost 1 drop Right Eye Nightly   levothyroxine 50 mcg Oral Q AM   lisinopril 20 mg Oral Daily   [START ON 3/6/2020] LORazepam 0.5 mg Oral Daily   LORazepam 2 mg Oral Nightly   melatonin 10 mg Oral Nightly   nystatin 5 mL Swish & Spit TID   OLANZapine 5 mg Oral Nightly   pantoprazole 40 mg Oral Q AM   sennosides-docusate 2 tablet Oral BID   sodium chloride 10 mL Intravenous Q12H   timolol 1 drop Right Eye Q12H       Physical Exam   Constitutional:   Somnolent but awake and alert to person and place   Eyes: Conjunctivae and EOM are normal.    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   Cardiovascular: Normal rate, regular rhythm and normal heart sounds. Exam reveals no friction rub.   No murmur heard.  Pulmonary/Chest: Effort normal and breath sounds normal. No stridor. No respiratory distress. She has no wheezes. She has no rales.   Abdominal: Soft. Bowel sounds are normal. She exhibits no distension. There is no tenderness. There is no guarding.   Musculoskeletal: Normal range of motion. She exhibits no " edema.   Skin: Skin is warm and dry.   Nursing note and vitals reviewed.      LAB/DIAGNOSTICS:    Lab Results (last 24 hours)     Procedure Component Value Units Date/Time    Basic Metabolic Panel [[id number]]  (Abnormal) Collected:  03/05/20 0523    Specimen:  Blood Updated:  03/05/20 0613     Glucose 123 mg/dL      BUN 12 mg/dL      Creatinine 0.55 mg/dL      Sodium 139 mmol/L      Potassium 2.9 mmol/L      Chloride 100 mmol/L      CO2 28.7 mmol/L      Calcium 8.2 mg/dL      eGFR Non African Amer 104 mL/min/1.73      BUN/Creatinine Ratio 21.8     Anion Gap 10.3 mmol/L     Narrative:       GFR Normal >60  Chronic Kidney Disease <60  Kidney Failure <15      CBC & Differential [[id number]] Collected:  03/05/20 0523    Specimen:  Blood Updated:  03/05/20 0545    Narrative:       The following orders were created for panel order CBC & Differential.  Procedure                               Abnormality         Status                     ---------                               -----------         ------                     CBC Auto Differential[[id number]]        Abnormal            Final result                 Please view results for these tests on the individual orders.    CBC Auto Differential [[id number]]  (Abnormal) Collected:  03/05/20 0523    Specimen:  Blood Updated:  03/05/20 0545     WBC 9.87 10*3/mm3      RBC 2.82 10*6/mm3      Hemoglobin 8.9 g/dL      Hematocrit 26.4 %      MCV 93.6 fL      MCH 31.6 pg      MCHC 33.7 g/dL      RDW 12.1 %      RDW-SD 41.1 fl      MPV 11.7 fL      Platelets 163 10*3/mm3      Neutrophil % 75.9 %      Lymphocyte % 14.9 %      Monocyte % 7.7 %      Eosinophil % 0.7 %      Basophil % 0.2 %      Immature Grans % 0.6 %      Neutrophils, Absolute 7.49 10*3/mm3      Lymphocytes, Absolute 1.47 10*3/mm3      Monocytes, Absolute 0.76 10*3/mm3      Eosinophils, Absolute 0.07 10*3/mm3      Basophils, Absolute 0.02 10*3/mm3      Immature Grans, Absolute 0.06 10*3/mm3      nRBC 0.0 /100 WBC                No radiology results for the last day      ASSESSMENT/PLAN:    Postmenopausal 89WF with hypothyroidism and hypertension who presents for closed L hip fragility fracture following a mechanical fall from ground height. She attempted to ambulate without assistance of a cane or walker, Now s/p Hip ORIF by Alok ( 3/2/2020),     #Closed left hip fragility fracture, POA, reason for admission  #Left ankle sprain, clinically diagnosed. POA.   #Osteoporosis, new diagnosis based on fragility fracture of l hip from standing height, POA, needs follow up and work up by PCP  #Gait instability requiring ambulatory assistance  #Essential Hypertension, POA.   #Hypothyroidism  #Metabolic Encephalopathy -improving  #Acute Hypoxic Respiratory Failure due to oversedation pain medication-improved  #Acute anemia likely acute blood loss anemia as expected postop    - DVT prophylaxis, activity, and dressing changes per Ortho  -Limit narcotics as much as able  - Will need to follow up with PCP regarding initiation of Bisphosphonate therapy  - Continue home antihypertensives and add prn hydralazine, Will monitor post op  - Continue home medications     - Continue home Ativan to avoid family's perception of w/d, though could be making Encephalopathy worse.    - Maintain SpO2 >90% with supplemental O2. And capnography   - Will need to follow up with PT/OT once ms clears     - Have scheduled melatonin, feel though pt takes Ativan as an outpatient, PRN Ativan may be making the issue worse, On scheduled Olanzapine, and  olanzapine doses for PO prn,     -For anemia hemoglobin remains 8.9 baseline appears to be around 11.  Follow and transfuse if less than 7     - Has a living will stating that she is DNR Full Treatment  -If continues to improve feel that she will be ready for rehab in the next several days

## 2020-03-05 NOTE — THERAPY TREATMENT NOTE
Acute Care - Physical Therapy Treatment Note   [place]     Patient Name: Paulo Hampton  : 1930  MRN: [id number]  Today's Date: 3/5/2020  Onset of Illness/Injury or Date of Surgery: 20(surgery 3/2/20)  Date of Referral to PT: 20  Referring Physician: Dr. Dickinson    Admit Date: 2020    Visit Dx:    ICD-10-CM ICD-9-CM   1. Closed fracture of left hip, initial encounter (CMS/[facility]) S72.002A 820.8   2. Fall, initial encounter W19.XXXA E888.9   3. Skin avulsion T14.8XXA 879.8   4. 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  family present during portion of treatment  -TODD      Care Plan Review  care plan/treatment goals reviewed;risks/benefits reviewed;patient/other agree to care plan  -JW      Patient Effort  fair  -JW      Existing Precautions/Restrictions  fall;hip, posterior  -      Treatment Considerations/Comments  pt with difficulty consistently following commands during session.  -JW      Recorded by [TODD] Rosa Cuenca, PT 20 1144      Row Name 20 0815             Cognitive Assessment/Intervention- PT/OT    Orientation Status (Cognition)  oriented to;person  -JW      Follows Commands (Cognition)  -- requires  repeated cues for command following  -      Safety Deficit (Cognitive)  severe deficit;ability to follow commands;awareness of need for assistance;insight into deficits/self awareness;judgment;problem solving;safety precautions awareness  -      Personal Safety Interventions  gait belt;nonskid shoes/slippers when out of bed  -JW      Recorded by [JW] Rosa Cuenca, PT 03/05/20 1144      Row Name 03/05/20 0815             Bed Mobility Assessment/Treatment    Supine-Sit [place] (Bed Mobility)  maximum assist (25% patient effort);2 person assist;verbal cues  -JW      Sit-Supine [place] (Bed Mobility)  dependent (less than 25% patient effort);2 person assist;verbal cues  -      Assistive Device (Bed Mobility)  bed rails;head of bed elevated;draw sheet  -      Comment (Bed Mobility)  requires significant cues and assist to obtain sitting balance at EOB  -      Recorded by [JW] Rosa Cuenca, PT 03/05/20 1144      Row Name 03/05/20 0815             Transfer Assessment/Treatment    Transfer Assessment/Treatment  sit-stand transfer;stand-sit transfer  -      Comment (Transfers)  Patient performs transfer from elevated bed surface with significant assistance for upright posture and proper midline orientation  -JW      Recorded by [JW] Rosa Cuenca, PT 03/05/20 1144      Row Name 03/05/20 0815             Sit-Stand Transfer    Sit-Stand [place] (Transfers)  maximum assist (25% patient effort);2 person assist;verbal cues 3rd staff member required for safety  -      Assistive Device (Sit-Stand Transfers)  walker, front-wheeled  -      Recorded by [JW] Rosa Cuenca, PT 03/05/20 1144      Row Name 03/05/20 0815             Stand-Sit Transfer    Stand-Sit [place] (Transfers)  maximum assist (25% patient effort);2 person assist;verbal cues  -      Assistive Device (Stand-Sit Transfers)  walker, front-wheeled  -      Recorded by [JW] Rosa Cuenca, PT 03/05/20 1144      Row Name  03/05/20 0815             Gait/Stairs Assessment/Training    Comment (Gait/Stairs)  unable to attempt at this time  -JW      Recorded by [JW] Rosa Cuenca, PT 03/05/20 1144      Row Name 03/05/20 0815             Static Sitting Balance    Comment (Unsupported Sitting, Static Balance)  pt with intermittent periods of significant right lateral lean.  assistance levels vary from CGA to max assist for static sitting balance, requires significant assistance to correct to proper midline.  -JW      Recorded by [JW] Rosa Cuenca, PT 03/05/20 1144      Row Name 03/05/20 0815             Positioning and Restraints    Pre-Treatment Position  in bed  -JW      Post Treatment Position  bed  -JW      In Bed  supine;call light within reach;encouraged to call for assist;exit alarm on;with family/caregiver  -JW      Recorded by [JW] Rosa Cuenca, PT 03/05/20 1144      Row Name 03/05/20 0815             Pain Scale: Numbers Pre/Post-Treatment    Pre/Post Treatment Pain Comment  -- pt unable to rate pain due to cognition  -JW      Recorded by [JW] Rosa Cuenca, PT 03/05/20 1144      Row Name                Wound 03/01/20 0907 Left elbow Skin Tear    Wound - Properties Group Date first assessed: 03/01/20 [MD] Time first assessed: 0907 [MD] Present on Hospital Admission: Y [MD] Side: Left [MD] Location: elbow [MD] Primary Wound Type: Skin tear [MD] Stage, Pressure Injury: other (see comments) [MD] Additional Comments: From fall pre-admission [MD] Recorded by:  [MD] Jackelyn Jean, RN 03/01/20 0908    Row Name                Wound 03/02/20 1621 Left lateral hip Incision    Wound - Properties Group Date first assessed: 03/02/20 [ME] Time first assessed: 1621 [ME] Side: Left [ME] Orientation: lateral [ME] Location: hip [ME] Primary Wound Type: Incision [ME] Additional Comments: prineo, telfa, tegaderms, abduction pillow [ME] Recorded by:  [ME] Clary Asif, RN 03/02/20 1621    Row Name 03/05/20 0815             Plan of Care  Review    Plan of Care Reviewed With  patient  -JW      Outcome Summary  PT: Patient oriented x1 and remains confused within session.  Patient performs supine to sit with max assist x2 and sit to stand with max assist x2 with additional staff member to assist.  Patient unable to tolerate fully upright posture and difficulty maintaining standing balance.  Patient unable to take steps at this time.  Due to current physical and cognitive status, recommend further rehab in facility where patient can remain until hip precautions are removed with potential to transition to long term care if needed.    -JW      Recorded by [JW] Rosa Cuenca, PT 03/05/20 1144      Row Name 03/05/20 0815             Outcome Summary/Treatment Plan (PT)    Daily Summary of Progress (PT)  progress towards functional goals is fair  -TODD      Anticipated Discharge Disposition (PT)  skilled nursing facility;extended care facility  -JW      Recorded by [JW] Rosa Cuenca, PT 03/05/20 1144        User Key  (r) = Recorded By, (t) = Taken By, (c) = Cosigned By    Initials Name Effective Dates Discipline    JW Rosa Cuenca, PT 04/03/18 -  PT    Clary Coulter, RN 09/30/16 -  Nurse    Jackelyn Kwon, RN 07/18/19 -  Nurse          Wound 03/01/20 0907 Left elbow Skin Tear (Active)   Dressing Appearance dry;intact 3/4/2020  8:00 PM   Closure Open to air 3/4/2020  8:00 PM   Base dry;scab 3/4/2020  8:00 PM   Drainage Amount none 3/4/2020  8:00 PM       Wound 03/02/20 1621 Left lateral hip Incision (Active)   Dressing Appearance dry;intact;no drainage 3/4/2020  8:00 PM   Closure HEATH 3/4/2020  8:00 PM   Periwound intact;ecchymotic 3/4/2020  8:00 PM               PT Recommendation and Plan  Anticipated Discharge Disposition (PT): skilled nursing facility, extended care facility  Outcome Summary/Treatment Plan (PT)  Daily Summary of Progress (PT): progress towards functional goals is fair  Anticipated Discharge Disposition (PT): skilled nursing  facility, extended care facility  Plan of Care Reviewed With: patient  Outcome Summary: PT: Patient oriented x1 and remains confused within session.  Patient performs supine to sit with max assist x2 and sit to stand with max assist x2 with additional staff member to assist.  Patient unable to tolerate fully upright posture and difficulty maintaining standing balance.  Patient unable to take steps at this time.  Due to current physical and cognitive status, recommend further rehab in facility where patient can remain until hip precautions are removed with potential to transition to long term care if needed.    Outcome Measures     Row Name 03/05/20 0815 03/04/20 1100 03/04/20 0914       How much help from another person do you currently need...    Turning from your back to your side while in flat bed without using bedrails?  1  -JW  --  --    Moving from lying on back to sitting on the side of a flat bed without bedrails?  1  -JW  --  --    Moving to and from a bed to a chair (including a wheelchair)?  1  -JW  --  --    Standing up from a chair using your arms (e.g., wheelchair, bedside chair)?  1  -JW  --  --    Climbing 3-5 steps with a railing?  1  -JW  --  --    To walk in hospital room?  1  -JW  --  --    AM-PAC 6 Clicks Score (PT)  6  -JW  --  --       How much help from another is currently needed...    Putting on and taking off regular lower body clothing?  --  --  1  -EN    Bathing (including washing, rinsing, and drying)  --  --  1  -EN    Toileting (which includes using toilet bed pan or urinal)  --  --  1  -EN    Putting on and taking off regular upper body clothing  --  --  1  -EN    Taking care of personal grooming (such as brushing teeth)  --  --  2  -EN    Eating meals  --  --  2  -EN    AM-PAC 6 Clicks Score (OT)  --  --  8  -EN       Functional Assessment    Outcome Measure Options  AM-PAC 6 Clicks Basic Mobility (PT)  -JW  AM-PAC 6 Clicks Daily Activity (OT)  -EN  --    Row Name 03/04/20 0913              How much help from another person do you currently need...    Turning from your back to your side while in flat bed without using bedrails?  1  -BP      Moving from lying on back to sitting on the side of a flat bed without bedrails?  1  -BP      Moving to and from a bed to a chair (including a wheelchair)?  1  -BP      Standing up from a chair using your arms (e.g., wheelchair, bedside chair)?  1  -BP      Climbing 3-5 steps with a railing?  1  -BP      To walk in hospital room?  1  -BP      AM-PAC 6 Clicks Score (PT)  6  -BP         Functional Assessment    Outcome Measure Options  AM-PAC 6 Clicks Basic Mobility (PT)  -BP        User Key  (r) = Recorded By, (t) = Taken By, (c) = Cosigned By    Initials Name Provider Type    Concepcion Willard, OTR Occupational Therapist    BP Alida Bryan, PT Physical Therapist    Rosa Nieves, PT Physical Therapist         Time Calculation:   PT Charges     Row Name 03/05/20 1145             Time Calculation    Start Time  0815  -      Stop Time  0839  -      Time Calculation (min)  24 min  -      PT Received On  03/05/20  -TODD      PT - Next Appointment  03/06/20  -         Timed Charges    30084 - PT Therapeutic Exercise Minutes  24  -        User Key  (r) = Recorded By, (t) = Taken By, (c) = Cosigned By    Initials Name Provider Type    Rosa Nieves, MORGAN Physical Therapist        Therapy Charges for Today     Code Description Service Date Service Provider Modifiers Qty    94552502223 HC PT THER PROC EA 15 MIN 3/5/2020 Rosa Cuenca, PT GP 2          PT G-Codes  Outcome Measure Options: AM-PAC 6 Clicks Basic Mobility (PT)  AM-PAC 6 Clicks Score (PT): 6  AM-PAC 6 Clicks Score (OT): 8    Rosa Cuenca PT  3/5/2020

## 2020-03-05 NOTE — PLAN OF CARE
Problem: Patient Care Overview  Goal: Plan of Care Review  Flowsheets (Taken 3/5/2020 7981)  Outcome Summary: PT: Patient oriented x1 and remains confused within session.  Patient performs supine to sit with max assist x2 and sit to stand with max assist x2 with additional staff member to assist.  Patient unable to tolerate fully upright posture and difficulty maintaining standing balance.  Patient unable to take steps at this time.  Due to current physical and cognitive status, recommend further rehab in facility where patient can remain until hip precautions are removed with potential to transition to long term care if needed.

## 2020-03-05 NOTE — NURSING NOTE
Continued Stay Note  PAULO Pillai     Patient Name: Paulo Hampton  MRN: [id number]  Today's Date: 3/5/2020    Admit Date: 2/29/2020    Discharge Plan     Row Name 03/05/20 1323       Plan    Plan  SNF [facility]    Plan Comments  Phone call to Neisha at [facility].  Neisha has been following pt's progress and will start precert at this time.  F/U visit in pt's room.  Pt is sitting up in bed eating. Pt's son at bedside.  Pt appears to be doing better, more alert and no further needs noted at this time.  CM will continue to follow for needs.          Discharge Codes    No documentation.             Mario Zamora RN

## 2020-03-06 LAB
ANION GAP SERPL CALCULATED.3IONS-SCNC: 9.7 MMOL/L (ref 5–15)
BACTERIA UR QL AUTO: ABNORMAL /HPF
BASOPHILS # BLD AUTO: 0.02 10*3/MM3 (ref 0–0.2)
BASOPHILS NFR BLD AUTO: 0.2 % (ref 0–1.5)
BILIRUB UR QL STRIP: NEGATIVE
BUN BLD-MCNC: 12 MG/DL (ref 8–23)
BUN/CREAT SERPL: 20 (ref 7–25)
CALCIUM SPEC-SCNC: 7.9 MG/DL (ref 8.6–10.5)
CHLORIDE SERPL-SCNC: 100 MMOL/L (ref 98–107)
CLARITY UR: CLEAR
CO2 SERPL-SCNC: 28.3 MMOL/L (ref 22–29)
COLOR UR: YELLOW
CREAT BLD-MCNC: 0.6 MG/DL (ref 0.57–1)
DEPRECATED RDW RBC AUTO: 43.8 FL (ref 37–54)
EOSINOPHIL # BLD AUTO: 0.13 10*3/MM3 (ref 0–0.4)
EOSINOPHIL NFR BLD AUTO: 1.3 % (ref 0.3–6.2)
ERYTHROCYTE [DISTWIDTH] IN BLOOD BY AUTOMATED COUNT: 12.5 % (ref 12.3–15.4)
GFR SERPL CREATININE-BSD FRML MDRD: 94 ML/MIN/1.73
GLUCOSE BLD-MCNC: 127 MG/DL (ref 65–99)
GLUCOSE UR STRIP-MCNC: NEGATIVE MG/DL
HCT VFR BLD AUTO: 27.8 % (ref 34–46.6)
HGB BLD-MCNC: 9.1 G/DL (ref 12–15.9)
HGB UR QL STRIP.AUTO: ABNORMAL
HYALINE CASTS UR QL AUTO: ABNORMAL /LPF
IMM GRANULOCYTES # BLD AUTO: 0.04 10*3/MM3 (ref 0–0.05)
IMM GRANULOCYTES NFR BLD AUTO: 0.4 % (ref 0–0.5)
KETONES UR QL STRIP: NEGATIVE
LEUKOCYTE ESTERASE UR QL STRIP.AUTO: NEGATIVE
LYMPHOCYTES # BLD AUTO: 2.1 10*3/MM3 (ref 0.7–3.1)
LYMPHOCYTES NFR BLD AUTO: 20.6 % (ref 19.6–45.3)
MCH RBC QN AUTO: 31.3 PG (ref 26.6–33)
MCHC RBC AUTO-ENTMCNC: 32.7 G/DL (ref 31.5–35.7)
MCV RBC AUTO: 95.5 FL (ref 79–97)
MONOCYTES # BLD AUTO: 0.93 10*3/MM3 (ref 0.1–0.9)
MONOCYTES NFR BLD AUTO: 9.1 % (ref 5–12)
NEUTROPHILS # BLD AUTO: 6.99 10*3/MM3 (ref 1.7–7)
NEUTROPHILS NFR BLD AUTO: 68.4 % (ref 42.7–76)
NITRITE UR QL STRIP: NEGATIVE
NRBC BLD AUTO-RTO: 0 /100 WBC (ref 0–0.2)
PH UR STRIP.AUTO: 8 [PH] (ref 4.5–8)
PLATELET # BLD AUTO: 196 10*3/MM3 (ref 140–450)
PMV BLD AUTO: 12.4 FL (ref 6–12)
POTASSIUM BLD-SCNC: 3.8 MMOL/L (ref 3.5–5.2)
PROT UR QL STRIP: NEGATIVE
RBC # BLD AUTO: 2.91 10*6/MM3 (ref 3.77–5.28)
RBC # UR: ABNORMAL /HPF
REF LAB TEST METHOD: ABNORMAL
SODIUM BLD-SCNC: 138 MMOL/L (ref 136–145)
SP GR UR STRIP: 1.01 (ref 1–1.03)
SQUAMOUS #/AREA URNS HPF: ABNORMAL /HPF
UROBILINOGEN UR QL STRIP: ABNORMAL
WBC NRBC COR # BLD: 10.21 10*3/MM3 (ref 3.4–10.8)
WBC UR QL AUTO: ABNORMAL /HPF

## 2020-03-06 PROCEDURE — 25010000002 HALOPERIDOL LACTATE PER 5 MG: Performed by: INTERNAL MEDICINE

## 2020-03-06 PROCEDURE — 81001 URINALYSIS AUTO W/SCOPE: CPT | Performed by: HOSPITALIST

## 2020-03-06 PROCEDURE — 94799 UNLISTED PULMONARY SVC/PX: CPT

## 2020-03-06 PROCEDURE — 85025 COMPLETE CBC W/AUTO DIFF WBC: CPT | Performed by: INTERNAL MEDICINE

## 2020-03-06 PROCEDURE — 25010000002 ENOXAPARIN PER 10 MG: Performed by: ORTHOPAEDIC SURGERY

## 2020-03-06 PROCEDURE — 80048 BASIC METABOLIC PNL TOTAL CA: CPT | Performed by: INTERNAL MEDICINE

## 2020-03-06 PROCEDURE — 99232 SBSQ HOSP IP/OBS MODERATE 35: CPT | Performed by: HOSPITALIST

## 2020-03-06 RX ORDER — OLANZAPINE 2.5 MG/1
10 TABLET ORAL
Status: DISCONTINUED | OUTPATIENT
Start: 2020-03-06 | End: 2020-03-07 | Stop reason: HOSPADM

## 2020-03-06 RX ORDER — OLANZAPINE 2.5 MG/1
10 TABLET ORAL NIGHTLY
Status: DISCONTINUED | OUTPATIENT
Start: 2020-03-06 | End: 2020-03-06

## 2020-03-06 RX ORDER — NYSTATIN 100000 [USP'U]/G
POWDER TOPICAL EVERY 12 HOURS SCHEDULED
Status: DISCONTINUED | OUTPATIENT
Start: 2020-03-06 | End: 2020-03-07 | Stop reason: HOSPADM

## 2020-03-06 RX ADMIN — LATANOPROST 1 DROP: 50 SOLUTION OPHTHALMIC at 20:34

## 2020-03-06 RX ADMIN — TIMOLOL MALEATE 1 DROP: 5 SOLUTION/ DROPS OPHTHALMIC at 10:02

## 2020-03-06 RX ADMIN — HALOPERIDOL LACTATE 2 MG: 5 INJECTION, SOLUTION INTRAMUSCULAR at 07:43

## 2020-03-06 RX ADMIN — DORZOLAMIDE HCL 1 DROP: 20 SOLUTION/ DROPS OPHTHALMIC at 10:01

## 2020-03-06 RX ADMIN — NYSTATIN 500000 UNITS: 500000 SUSPENSION ORAL at 10:10

## 2020-03-06 RX ADMIN — NYSTATIN: 100000 POWDER TOPICAL at 20:24

## 2020-03-06 RX ADMIN — DORZOLAMIDE HCL 1 DROP: 20 SOLUTION/ DROPS OPHTHALMIC at 20:34

## 2020-03-06 RX ADMIN — BRIMONIDINE TARTRATE 1 DROP: 2 SOLUTION/ DROPS OPHTHALMIC at 20:34

## 2020-03-06 RX ADMIN — HALOPERIDOL LACTATE 2 MG: 5 INJECTION, SOLUTION INTRAMUSCULAR at 04:42

## 2020-03-06 RX ADMIN — BRIMONIDINE TARTRATE 1 DROP: 2 SOLUTION/ DROPS OPHTHALMIC at 10:01

## 2020-03-06 RX ADMIN — MELATONIN TAB 5 MG 10 MG: 5 TAB at 20:23

## 2020-03-06 RX ADMIN — SODIUM CHLORIDE, PRESERVATIVE FREE 10 ML: 5 INJECTION INTRAVENOUS at 10:03

## 2020-03-06 RX ADMIN — ENOXAPARIN SODIUM 40 MG: 40 INJECTION SUBCUTANEOUS at 09:49

## 2020-03-06 RX ADMIN — LEVOTHYROXINE SODIUM 50 MCG: 50 TABLET ORAL at 05:41

## 2020-03-06 RX ADMIN — PANTOPRAZOLE SODIUM 40 MG: 40 TABLET, DELAYED RELEASE ORAL at 05:41

## 2020-03-06 RX ADMIN — HALOPERIDOL LACTATE 2 MG: 5 INJECTION, SOLUTION INTRAMUSCULAR at 09:45

## 2020-03-06 RX ADMIN — SODIUM CHLORIDE, PRESERVATIVE FREE 10 ML: 5 INJECTION INTRAVENOUS at 20:24

## 2020-03-06 RX ADMIN — ACETAMINOPHEN 650 MG: 325 TABLET, FILM COATED ORAL at 09:47

## 2020-03-06 RX ADMIN — OLANZAPINE 10 MG: 2.5 TABLET, FILM COATED ORAL at 17:08

## 2020-03-06 RX ADMIN — CETIRIZINE HYDROCHLORIDE 10 MG: 10 TABLET, FILM COATED ORAL at 09:55

## 2020-03-06 RX ADMIN — LISINOPRIL 20 MG: 20 TABLET ORAL at 09:56

## 2020-03-06 RX ADMIN — ACETAMINOPHEN 650 MG: 325 TABLET, FILM COATED ORAL at 06:06

## 2020-03-06 RX ADMIN — LABETALOL HYDROCHLORIDE 10 MG: 5 INJECTION INTRAVENOUS at 05:41

## 2020-03-06 RX ADMIN — TIMOLOL MALEATE 1 DROP: 5 SOLUTION/ DROPS OPHTHALMIC at 20:34

## 2020-03-06 RX ADMIN — LORAZEPAM 0.5 MG: 0.5 TABLET ORAL at 09:56

## 2020-03-06 RX ADMIN — LORAZEPAM 2 MG: 1 TABLET ORAL at 20:23

## 2020-03-06 RX ADMIN — DILTIAZEM HYDROCHLORIDE 30 MG: 30 TABLET, FILM COATED ORAL at 09:55

## 2020-03-06 RX ADMIN — SENNOSIDES AND DOCUSATE SODIUM 2 TABLET: 8.6; 5 TABLET ORAL at 09:55

## 2020-03-06 RX ADMIN — NYSTATIN 500000 UNITS: 500000 SUSPENSION ORAL at 17:11

## 2020-03-06 RX ADMIN — POTASSIUM CHLORIDE 40 MEQ: 1500 TABLET, EXTENDED RELEASE ORAL at 03:31

## 2020-03-06 RX ADMIN — SENNOSIDES AND DOCUSATE SODIUM 2 TABLET: 8.6; 5 TABLET ORAL at 20:23

## 2020-03-06 RX ADMIN — NYSTATIN 500000 UNITS: 500000 SUSPENSION ORAL at 20:24

## 2020-03-06 RX ADMIN — DILTIAZEM HYDROCHLORIDE 30 MG: 30 TABLET, FILM COATED ORAL at 20:23

## 2020-03-06 NOTE — PROGRESS NOTES
POD# 4 s/p left hip bipolar hemiarthroplasty for treatment of femoral neck fracture    Subjective: Paulo Hampton resting in bed,.Granddaughter at bedside.  Denies that she is experiencing pain left lower extremity she remains confused and agitated this morning.  Denies presence of numbness or tingling left lower extremity.    Objective:  Vitals:    03/05/20 2320 03/05/20 2327 03/06/20 0531 03/06/20 0541   BP: 150/75 150/75 (!) 183/78 (!) 183/78   BP Location: Left arm  Left arm Left arm   Patient Position: Lying  Lying Lying   Pulse: 85 85  78   Resp: 20   18   Temp: 100.1 °F (37.8 °C)   99 °F (37.2 °C)   TempSrc: Axillary   Axillary   SpO2: 92%   96%   Weight:       Height:           Results from last 7 days   Lab Units 03/06/20  0523 03/05/20  0523 03/04/20  0528  02/29/20  2112   WBC 10*3/mm3 10.21 9.87  --   --  10.83*   HEMOGLOBIN g/dL 9.1* 8.9* 8.9*   < > 13.5   HEMATOCRIT % 27.8* 26.4* 25.8*   < > 40.9   PLATELETS 10*3/mm3 196 163  --   --  175    < > = values in this interval not displayed.       Results from last 7 days   Lab Units 03/06/20  0523 03/05/20  0523 03/03/20  0546   SODIUM mmol/L 138 139 138   POTASSIUM mmol/L 3.8 2.9* 3.4*   CHLORIDE mmol/L 100 100 100   CO2 mmol/L 28.3 28.7 27.4   BUN mg/dL 12 12 12   CREATININE mg/dL 0.60 0.55* 0.57   GLUCOSE mg/dL 127* 123* 112*   CALCIUM mg/dL 7.9* 8.2* 8.4*       Exam:    Left lower extremity   Dressing clean/dry/intact  Compartment soft and easily compressible  Brisk cap refill all digits left foot  2+ dorsalis pedis pulse  Moderate ecchymosis dorsum foot    Impression: s/p left hip bipolar hemiarthroplasty for treatment     Plan:  1. PT/OT- weightbearing as tolerated left lower extremity, posterior hip precautions 6 weeks  2. Pain control-tylenol instead of norco unless pain significantly increases   3. DVT prophylaxis- Lovenox  4. Wound care-postop dressing removed this a.m. pernio Dermabond dressing remains intact  5. Disposition- short term rehab  with potential to transition to long-term care if needed per PT/OT; discharge per hospitalist.  We will plan to see her back in clinic in 2 weeks for wound check. If inpatient, will need x-ray imaging left hip prior to arrival thank you for allowing us to participate in the care of your patient, we will sign off, please call for questions.

## 2020-03-06 NOTE — SIGNIFICANT NOTE
03/06/20 1443   Rehab Treatment   Discipline physical therapist   Reason Treatment Not Performed   (attempted to see patient x2, pt very lethargic in AM and unable to participate.  Returned in PM, pt with eyes open, does not respond to questions and makes no eye contact with therapists present.  Pt unable to actively participate at this time.  Will follow up in AM)

## 2020-03-06 NOTE — NURSING NOTE
Continued Stay Note  PAULO Pillai     Patient Name: Paulo Hampton  MRN: [id number]  Today's Date: 3/6/2020    Admit Date: 2/29/2020    Discharge Plan     Row Name 03/06/20 1231       Plan    Plan  SNF [facility]    Plan Comments  Phone call recieved from Neisha at [facility].  Precert has come through, however pt has had a bad night involving increased agitation and restlessness.  PRN use of haldol.  [facility] reviewed pt again and will delay admit until symptoms are under control and pt requires no IV medications.  Precert will be good until Saturday.  Md and staff aware.  Orders noted          Discharge Codes    No documentation.             Mario Zamora RN

## 2020-03-06 NOTE — SIGNIFICANT NOTE
03/06/20 1624   Rehab Treatment   Discipline occupational therapist   Reason Treatment Not Performed   (attempted therapy x 2. pt unable to actively participate in therapy today, follow commands or make eye contact with therapists)

## 2020-03-06 NOTE — PROGRESS NOTES
"Hospitalist Team      Patient Care Team:  Laci Freedman MD as PCP - General (Family Medicine)        Chief Complaint:  Follow-up Agitation; Left Hip ALAINA    Subjective    Events noted overnight.  I've monitored her throughout the day, and she seems to be \"sundowning\" this evening, but she doesn't appear changed much.  Staff report fairly good PO intake today.  She has \"cat napped\" maybe twice.      Objective    Vital Signs  Temp:  [98 °F (36.7 °C)-100.1 °F (37.8 °C)] 98 °F (36.7 °C)  Heart Rate:  [72-97] 85  Resp:  [18-20] 18  BP: (132-187)/(59-80) 168/59  Oxygen Therapy  SpO2: 94 %  Pulse Oximetry Type: Continuous  Device (Oxygen Therapy): nasal cannula  Device (Oxygen Therapy): nasal cannula  Flow (L/min): 5(decreased to 3L)  ETCO2 (mmHg): 38 mmHg  $ ETCO2 Daily Assessment (RT Only): yes  Oximetry Probe Site Changed: Yes}    Flowsheet Rows      First Filed Value   Admission Height  144.8 cm (57\") Documented at 02/29/2020 1834   Admission Weight  62.3 kg (137 lb 6 oz) Documented at 02/29/2020 1834          Physical Exam:    General: Elderly appearing female in NAD  Lungs: Diminished throughout.  I appreciate no wheezed or rhonchi.  No accessory use.  CV: Regular rate and rhythm.  No murmur appreciated.  Radial pulses are 2+ and symmetric.  Abdomen: Soft and non-tender w/ diminished bowel sounds  MSK: No C/C.  No asymmetry.  Neuro: CN II-XII grossly intact.  Psych: Ox1    Results Review:     I reviewed the patient's new clinical results.    Lab Results (last 24 hours)     Procedure Component Value Units Date/Time    Basic Metabolic Panel [[id number]]  (Abnormal) Collected:  03/06/20 0523    Specimen:  Blood Updated:  03/06/20 0634     Glucose 127 mg/dL      BUN 12 mg/dL      Creatinine 0.60 mg/dL      Sodium 138 mmol/L      Potassium 3.8 mmol/L      Chloride 100 mmol/L      CO2 28.3 mmol/L      Calcium 7.9 mg/dL      eGFR Non African Amer 94 mL/min/1.73      BUN/Creatinine Ratio 20.0     Anion Gap 9.7 mmol/L     " Narrative:       GFR Normal >60  Chronic Kidney Disease <60  Kidney Failure <15      CBC & Differential [[id number]] Collected:  03/06/20 0523    Specimen:  Blood Updated:  03/06/20 0611    Narrative:       The following orders were created for panel order CBC & Differential.  Procedure                               Abnormality         Status                     ---------                               -----------         ------                     CBC Auto Differential[[id number]]        Abnormal            Final result                 Please view results for these tests on the individual orders.    CBC Auto Differential [[id number]]  (Abnormal) Collected:  03/06/20 0523    Specimen:  Blood Updated:  03/06/20 0611     WBC 10.21 10*3/mm3      RBC 2.91 10*6/mm3      Hemoglobin 9.1 g/dL      Hematocrit 27.8 %      MCV 95.5 fL      MCH 31.3 pg      MCHC 32.7 g/dL      RDW 12.5 %      RDW-SD 43.8 fl      MPV 12.4 fL      Platelets 196 10*3/mm3      Neutrophil % 68.4 %      Lymphocyte % 20.6 %      Monocyte % 9.1 %      Eosinophil % 1.3 %      Basophil % 0.2 %      Immature Grans % 0.4 %      Neutrophils, Absolute 6.99 10*3/mm3      Lymphocytes, Absolute 2.10 10*3/mm3      Monocytes, Absolute 0.93 10*3/mm3      Eosinophils, Absolute 0.13 10*3/mm3      Basophils, Absolute 0.02 10*3/mm3      Immature Grans, Absolute 0.04 10*3/mm3      nRBC 0.0 /100 WBC           Imaging Results (Last 24 Hours)     ** No results found for the last 24 hours. **            Medication Review:   I have reviewed the patient's current medication list    Current Facility-Administered Medications:   •  acetaminophen (TYLENOL) tablet 650 mg, 650 mg, Oral, Q4H PRN, Jeff Dickinson MD, 650 mg at 03/06/20 0947  •  bisacodyl (DULCOLAX) EC tablet 5 mg, 5 mg, Oral, Daily PRN, Jeff Dickinson MD  •  brimonidine (ALPHAGAN) 0.2 % ophthalmic solution 1 drop, 1 drop, Right Eye, BID, Jeff Dickinson MD, 1 drop at 03/06/20 1001  •  cetirizine (zyrTEC)  tablet 10 mg, 10 mg, Oral, Daily, Jeff Dickinson MD, 10 mg at 03/06/20 0955  •  clotrimazole-betamethasone (LOTRISONE) 1-0.05 % cream 1 application, 1 application, Topical, BID PRN, Jfef Dickinson MD  •  dicyclomine (BENTYL) capsule 10 mg, 10 mg, Oral, 4x Daily AC & at Bedtime PRN, Jeff Dickinson MD  •  dilTIAZem (CARDIZEM) tablet 30 mg, 30 mg, Oral, BID, Jeff Dickinson MD, 30 mg at 03/06/20 0955  •  dorzolamide (TRUSOPT) 2 % ophthalmic solution 1 drop, 1 drop, Right Eye, BID, Jeff Dickinson MD, 1 drop at 03/06/20 1001  •  enoxaparin (LOVENOX) syringe 40 mg, 40 mg, Subcutaneous, Daily, Jeff Dickinson MD, 40 mg at 03/06/20 0949  •  fluticasone (FLONASE) 50 MCG/ACT nasal spray 2 spray, 2 spray, Nasal, Daily, Jeff Dickinson MD, 2 spray at 03/05/20 0906  •  guaiFENesin (MUCINEX) 12 hr tablet 600 mg, 600 mg, Oral, Q12H PRN, Jeff Dickinson MD  •  haloperidol lactate (HALDOL) injection 2 mg, 2 mg, Intravenous, Q1H PRN, Liz Mujica MD, 2 mg at 03/06/20 0945  •  hydrALAZINE (APRESOLINE) injection 10 mg, 10 mg, Intravenous, Q6H PRN, Jeff Dickinson MD  •  HYDROcodone-acetaminophen (NORCO) 7.5-325 MG per tablet 1 tablet, 1 tablet, Oral, Q4H PRN, Jeff Dickinson MD, 1 tablet at 03/05/20 2328  •  labetalol (NORMODYNE,TRANDATE) injection 10 mg, 10 mg, Intravenous, Q6H, Jeff Dickinson MD, 10 mg at 03/06/20 0541  •  latanoprost (XALATAN) 0.005 % ophthalmic solution 1 drop, 1 drop, Right Eye, Nightly, Jeff Dickinson MD, 1 drop at 03/05/20 2158  •  levothyroxine (SYNTHROID, LEVOTHROID) tablet 50 mcg, 50 mcg, Oral, Q AM, Jeff Dickinson MD, 50 mcg at 03/06/20 0541  •  lisinopril (PRINIVIL,ZESTRIL) tablet 20 mg, 20 mg, Oral, Daily, Jeff Dickinson MD, 20 mg at 03/06/20 0956  •  LORazepam (ATIVAN) tablet 0.5 mg, 0.5 mg, Oral, Daily, Juan Jose Venegas DO, 0.5 mg at 03/06/20 0956  •  LORazepam (ATIVAN) tablet 2 mg, 2 mg, Oral, Nightly, Jeff Dickinson MD, 2 mg at  03/05/20 2156  •  melatonin tablet 10 mg, 10 mg, Oral, Nightly, Jeff Dickinson MD, 10 mg at 03/05/20 2155  •  [DISCONTINUED] HYDROmorphone PF (DILAUDID) injection 1 mg, 1 mg, Intravenous, Q2H PRN, 1 mg at 03/02/20 0244 **AND** naloxone (NARCAN) injection 0.4 mg, 0.4 mg, Intravenous, Q5 Min PRN, Jeff Dickinson MD, 0.4 mg at 03/03/20 1444  •  [DISCONTINUED] morphine injection 2 mg, 2 mg, Intravenous, Q4H PRN **AND** naloxone (NARCAN) injection 0.4 mg, 0.4 mg, Intravenous, Q5 Min PRN, Liz Mujica MD  •  [DISCONTINUED] morphine injection 2 mg, 2 mg, Intravenous, Q2H PRN **AND** naloxone (NARCAN) injection 0.4 mg, 0.4 mg, Intravenous, Q5 Min PRN, Juan Jose Venegas, DO  •  nystatin (MYCOSTATIN) 047570 UNIT/ML suspension 500,000 Units, 5 mL, Swish & Spit, TID, Juan Jose Venegas, DO, 500,000 Units at 03/06/20 1010  •  OLANZapine (zyPREXA) tablet 5 mg, 5 mg, Oral, BID PRN, Liz Mujica MD, 5 mg at 03/04/20 0505  •  OLANZapine (zyPREXA) tablet 5 mg, 5 mg, Oral, Nightly, Juan Jose Venegas DO, 5 mg at 03/05/20 2154  •  ondansetron (ZOFRAN) injection 4 mg, 4 mg, Intravenous, Q6H PRN, Jeff Dickinson MD, 4 mg at 03/05/20 1808  •  ondansetron (ZOFRAN) tablet 4 mg, 4 mg, Oral, Q6H PRN **OR** ondansetron (ZOFRAN) injection 4 mg, 4 mg, Intravenous, Q6H PRN, Jeff Dickinson MD  •  pantoprazole (PROTONIX) EC tablet 40 mg, 40 mg, Oral, Q AM, Jeff Dickinson MD, 40 mg at 03/06/20 0541  •  potassium chloride (K-DUR,KLOR-CON) CR tablet 40 mEq, 40 mEq, Oral, PRN, 40 mEq at 03/06/20 0331 **OR** potassium chloride (KLOR-CON) packet 40 mEq, 40 mEq, Oral, PRN **OR** potassium chloride 10 mEq in 100 mL IVPB, 10 mEq, Intravenous, Q1H PRN, Juan Jose Venegas, DO  •  sennosides-docusate (PERICOLACE) 8.6-50 MG per tablet 2 tablet, 2 tablet, Oral, BID, Jeff Dickinson MD, 2 tablet at 03/06/20 0955  •  sodium chloride 0.9 % flush 10 mL, 10 mL, Intravenous, PRN, Jeff Dickinson MD  •  sodium chloride 0.9 % flush 10 mL,  10 mL, Intravenous, Q12H, Jeff Dickinson MD, 10 mL at 03/06/20 1003  •  sodium chloride 0.9 % infusion 40 mL, 40 mL, Intravenous, PRN, Jeff Dickinson MD  •  timolol (TIMOPTIC) 0.5 % ophthalmic solution 1 drop, 1 drop, Right Eye, Q12H, Jeff Dickinson MD, 1 drop at 03/06/20 1002      Assessment/Plan     1.  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  I checked a UA which was negative.  Will increase Zyprexa to 10mg.  If she appears stable, will hold evening dose of Ativan.  If not, the dose may be given.  I explained to granddaughter that this is likely the combination of many things (hospitalization, anesthesia, surgery, etc).  I've stopped her narcotic.  PT did not recommend moving patient to the chair today.    2.  Left ALAINA: POD#4.  Ortho has signed off.    3.  HTN: She is taking her PO therapy, but some of these readings are elevated from agitation.  Continue to monitor trend.    4.  Hypothyroidism: Add TSH to blood in lab.  For now, continue current replacement dose.    5.  Anemia: Hgb stable.  Suspect this is post-op.  Minimize blood draws as much as possible.    Plan for disposition: Precert good for 48 hours which expires tomorrow.  Will see if ready for discharge tomorrow as additional medication changes made today.    Sonny Lewis MD  03/06/20  10:50 AM

## 2020-03-07 VITALS
BODY MASS INDEX: 24.99 KG/M2 | SYSTOLIC BLOOD PRESSURE: 153 MMHG | RESPIRATION RATE: 18 BRPM | DIASTOLIC BLOOD PRESSURE: 78 MMHG | HEART RATE: 88 BPM | HEIGHT: 62 IN | WEIGHT: 135.8 LBS | TEMPERATURE: 99.2 F | OXYGEN SATURATION: 92 %

## 2020-03-07 PROCEDURE — 99239 HOSP IP/OBS DSCHRG MGMT >30: CPT | Performed by: INTERNAL MEDICINE

## 2020-03-07 PROCEDURE — 97110 THERAPEUTIC EXERCISES: CPT

## 2020-03-07 PROCEDURE — 25010000002 ENOXAPARIN PER 10 MG: Performed by: ORTHOPAEDIC SURGERY

## 2020-03-07 RX ORDER — AMOXICILLIN 250 MG
2 CAPSULE ORAL 2 TIMES DAILY
Start: 2020-03-07

## 2020-03-07 RX ORDER — OLANZAPINE 10 MG/1
10 TABLET ORAL
Qty: 30 TABLET | Refills: 0 | Status: SHIPPED | OUTPATIENT
Start: 2020-03-07 | End: 2020-04-30

## 2020-03-07 RX ORDER — LORAZEPAM 1 MG/1
1 TABLET ORAL 3 TIMES DAILY
Qty: 12 TABLET | Refills: 0 | Status: SHIPPED | OUTPATIENT
Start: 2020-03-07

## 2020-03-07 RX ADMIN — LISINOPRIL 20 MG: 20 TABLET ORAL at 09:25

## 2020-03-07 RX ADMIN — PANTOPRAZOLE SODIUM 40 MG: 40 TABLET, DELAYED RELEASE ORAL at 09:33

## 2020-03-07 RX ADMIN — SENNOSIDES AND DOCUSATE SODIUM 2 TABLET: 8.6; 5 TABLET ORAL at 09:25

## 2020-03-07 RX ADMIN — NYSTATIN 500000 UNITS: 500000 SUSPENSION ORAL at 09:28

## 2020-03-07 RX ADMIN — DILTIAZEM HYDROCHLORIDE 30 MG: 30 TABLET, FILM COATED ORAL at 09:25

## 2020-03-07 RX ADMIN — NYSTATIN: 100000 POWDER TOPICAL at 09:30

## 2020-03-07 RX ADMIN — LEVOTHYROXINE SODIUM 50 MCG: 50 TABLET ORAL at 09:24

## 2020-03-07 RX ADMIN — ENOXAPARIN SODIUM 40 MG: 40 INJECTION SUBCUTANEOUS at 09:24

## 2020-03-07 RX ADMIN — FLUTICASONE PROPIONATE 2 SPRAY: 50 SPRAY, METERED NASAL at 09:27

## 2020-03-07 RX ADMIN — TIMOLOL MALEATE 1 DROP: 5 SOLUTION/ DROPS OPHTHALMIC at 09:23

## 2020-03-07 RX ADMIN — CETIRIZINE HYDROCHLORIDE 10 MG: 10 TABLET, FILM COATED ORAL at 09:27

## 2020-03-07 RX ADMIN — LORAZEPAM 0.5 MG: 0.5 TABLET ORAL at 09:24

## 2020-03-07 RX ADMIN — DORZOLAMIDE HCL 1 DROP: 20 SOLUTION/ DROPS OPHTHALMIC at 09:24

## 2020-03-07 RX ADMIN — BRIMONIDINE TARTRATE 1 DROP: 2 SOLUTION/ DROPS OPHTHALMIC at 09:23

## 2020-03-07 NOTE — PLAN OF CARE
Problem: Patient Care Overview  Goal: Plan of Care Review  Outcome: Ongoing (interventions implemented as appropriate)  Flowsheets (Taken 3/6/2020 1923)  Progress: no change  Plan of Care Reviewed With: patient; grandchild(vitor)  Outcome Summary: Pt agitated and confused at shift change, slurred speech and incoherent - minimal improvement with Haldol or Ativan. Grandaughter/family at bedside throughout shift. Abduction pillow, IV dressing continually needing to be reinforced throughout shift due to pt agitation and confusion. Periwick catheter used for UA collection; otherwise, pt removed. Tylenol for pain, pt taking medications crushed in applesauce. Poor to fair appetite. 3 L O2 nasal cannula. Will continue to monitor and assess.

## 2020-03-07 NOTE — PLAN OF CARE
Problem: Patient Care Overview  Goal: Plan of Care Review  Outcome: Ongoing (interventions implemented as appropriate)  Flowsheets (Taken 3/7/2020 1028)  Progress: improving  Plan of Care Reviewed With: patient; family  Outcome Summary: pt awake and alert to self, calm and cooperative with care, working with pt and able to stand for several minutes at bedside, plan to discharge pt to rehab facility today.

## 2020-03-07 NOTE — DISCHARGE SUMMARY
Date of Admission: 2020    Date of Discharge:  3/7/2020    Length of stay:  LOS: 7 days     Presenting Problem:   Skin avulsion [T14.8XXA]  Closed fracture of left hip, initial encounter (CMS/[facility]) [S72.002A]  Fall, initial encounter [W19.XXXA]  Sprain of left ankle, unspecified ligament, initial encounter [S93.402A]      Principal and Active Diagnosis During Hospital Stay:     Active Hospital Problems    Diagnosis  POA   • **Closed fracture of left hip (CMS/[facility]) [S72.002A]  Yes   • Pathological fracture of left hip due to osteoporosis (CMS/[facility]) [M80.052A]  Yes   • HTN (hypertension), benign [I10]  Yes   • Hypothyroidism [E03.9]  Yes      Resolved Hospital Problems   No resolved problems to display.     1.  Acute Encephalopathy: stable and somewhat improved at d/c. suspect she has some underlying organic brain disease which is manifesting itself during this hospitalization.  I checked a UA which was negative.  better on Zyprexa to 10mg. Also will continue home Ativan. MF to multiple things (hospitalization, anesthesia, surgery, etc).      2.  Left ALAINA: POD#5.  Ortho has signed off. Follow up outpt     3.  HTN: She is taking her PO therapy overall stable only elevated with agitation     4.  Hypothyroidism:   For now, continue current replacement dose.     5.  Anemia acute blood loss as expected post-op: Hgb stable.      Hospital Course  Patient is a 89 y.o. female presented with fall with hip fracture.  She underwent fixation.  Hospital course was complicated by encephalopathy.  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  She was felt still slightly confused, however overall better and being controlled on oral medications for her agitation and confusion.  Her pre-CERT was going to  for her to go to rehab, and since she was being controlled on oral medications for her encephalopathy, it  was felt that she could be discharged to the rehab facility.  Some degree of confusion is likely her new baseline.      Procedures Performed:as noted     Procedure(s):  HIP HEMIARTHROPLASTY and all associated procedures  -------------------       Consults:   Consults     Date and Time Order Name Status Description    3/1/2020 1150 Inpatient Orthopedic Surgery Consult Completed           Pertinent Test Results:     Lab Results (last 72 hours)     Procedure Component Value Units Date/Time    Urinalysis With Culture If Indicated - Urine, Clean Catch [[id number]]  (Abnormal) Collected:  03/06/20 1444    Specimen:  Urine, Clean Catch Updated:  03/06/20 1518     Color, UA Yellow     Appearance, UA Clear     pH, UA 8.0     Specific Gravity, UA 1.015     Glucose, UA Negative     Ketones, UA Negative     Bilirubin, UA Negative     Blood, UA Small (1+)     Protein, UA Negative     Leuk Esterase, UA Negative     Nitrite, UA Negative     Urobilinogen, UA 0.2 E.U./dL    Urinalysis, Microscopic Only - Urine, Clean Catch [[id number]]  (Abnormal) Collected:  03/06/20 1444    Specimen:  Urine, Clean Catch Updated:  03/06/20 1518     RBC, UA 0-2 /HPF      WBC, UA 0-2 /HPF      Bacteria, UA None Seen /HPF      Squamous Epithelial Cells, UA 0-2 /HPF      Hyaline Casts, UA None Seen /LPF      Methodology Manual Light Microscopy    Basic Metabolic Panel [[id number]]  (Abnormal) Collected:  03/06/20 0523    Specimen:  Blood Updated:  03/06/20 0634     Glucose 127 mg/dL      BUN 12 mg/dL      Creatinine 0.60 mg/dL      Sodium 138 mmol/L      Potassium 3.8 mmol/L      Chloride 100 mmol/L      CO2 28.3 mmol/L      Calcium 7.9 mg/dL      eGFR Non African Amer 94 mL/min/1.73      BUN/Creatinine Ratio 20.0     Anion Gap 9.7 mmol/L     Narrative:       GFR Normal >60  Chronic Kidney Disease <60  Kidney Failure <15      CBC & Differential [[id number]] Collected:  03/06/20 0523    Specimen:  Blood Updated:  03/06/20 0611    Narrative:       The  following orders were created for panel order CBC & Differential.  Procedure                               Abnormality         Status                     ---------                               -----------         ------                     CBC Auto Differential[[id number]]        Abnormal            Final result                 Please view results for these tests on the individual orders.    CBC Auto Differential [[id number]]  (Abnormal) Collected:  03/06/20 0523    Specimen:  Blood Updated:  03/06/20 0611     WBC 10.21 10*3/mm3      RBC 2.91 10*6/mm3      Hemoglobin 9.1 g/dL      Hematocrit 27.8 %      MCV 95.5 fL      MCH 31.3 pg      MCHC 32.7 g/dL      RDW 12.5 %      RDW-SD 43.8 fl      MPV 12.4 fL      Platelets 196 10*3/mm3      Neutrophil % 68.4 %      Lymphocyte % 20.6 %      Monocyte % 9.1 %      Eosinophil % 1.3 %      Basophil % 0.2 %      Immature Grans % 0.4 %      Neutrophils, Absolute 6.99 10*3/mm3      Lymphocytes, Absolute 2.10 10*3/mm3      Monocytes, Absolute 0.93 10*3/mm3      Eosinophils, Absolute 0.13 10*3/mm3      Basophils, Absolute 0.02 10*3/mm3      Immature Grans, Absolute 0.04 10*3/mm3      nRBC 0.0 /100 WBC     Basic Metabolic Panel [[id number]]  (Abnormal) Collected:  03/05/20 0523    Specimen:  Blood Updated:  03/05/20 0613     Glucose 123 mg/dL      BUN 12 mg/dL      Creatinine 0.55 mg/dL      Sodium 139 mmol/L      Potassium 2.9 mmol/L      Chloride 100 mmol/L      CO2 28.7 mmol/L      Calcium 8.2 mg/dL      eGFR Non African Amer 104 mL/min/1.73      BUN/Creatinine Ratio 21.8     Anion Gap 10.3 mmol/L     Narrative:       GFR Normal >60  Chronic Kidney Disease <60  Kidney Failure <15      CBC & Differential [[id number]] Collected:  03/05/20 0523    Specimen:  Blood Updated:  03/05/20 0545    Narrative:       The following orders were created for panel order CBC & Differential.  Procedure                               Abnormality         Status                     ---------                                -----------         ------                     CBC Auto Differential[[id number]]        Abnormal            Final result                 Please view results for these tests on the individual orders.    CBC Auto Differential [[id number]]  (Abnormal) Collected:  03/05/20 0523    Specimen:  Blood Updated:  03/05/20 0545     WBC 9.87 10*3/mm3      RBC 2.82 10*6/mm3      Hemoglobin 8.9 g/dL      Hematocrit 26.4 %      MCV 93.6 fL      MCH 31.6 pg      MCHC 33.7 g/dL      RDW 12.1 %      RDW-SD 41.1 fl      MPV 11.7 fL      Platelets 163 10*3/mm3      Neutrophil % 75.9 %      Lymphocyte % 14.9 %      Monocyte % 7.7 %      Eosinophil % 0.7 %      Basophil % 0.2 %      Immature Grans % 0.6 %      Neutrophils, Absolute 7.49 10*3/mm3      Lymphocytes, Absolute 1.47 10*3/mm3      Monocytes, Absolute 0.76 10*3/mm3      Eosinophils, Absolute 0.07 10*3/mm3      Basophils, Absolute 0.02 10*3/mm3      Immature Grans, Absolute 0.06 10*3/mm3      nRBC 0.0 /100 WBC                Microbiology Results (last 10 days)     ** No results found for the last 240 hours. **                 Imaging Results (All)     Procedure Component Value Units Date/Time    XR Hip With or Without Pelvis 1 View Left [[id number]] Collected:  03/02/20 1837     Updated:  03/02/20 1839    Narrative:       CR Hip 1 Vw LT    INDICATION:   Postop hip arthroplasty    COMPARISON:   2/29/2020    FINDINGS:  AP and frog-leg lateral view(s) of the left hip. Left hip arthroplasty in position. No evidence of hardware failure. No fractures are identified.      Impression:       Satisfactory postoperative exam.    Signer Name: Martínez Archibald MD   Signed: 3/2/2020 6:37 PM   Workstation Name: [id number]    [facility]    XR Chest 1 View [[id number]] Collected:  02/29/20 2144     Updated:  02/29/20 2146    Narrative:       CR Chest 1 Vw 2/29/2020    INDICATION:   Left hip fracture status post fall today. Preop ORIF. History of atrial  fibrillation and benign essential hypertension.     COMPARISON:    7/9/2017    FINDINGS:  Single portable AP view(s) of the chest.    No visible support lines.    The heart and mediastinal contours are normal. The lungs are clear. No pneumothorax or pleural effusion.       Impression:       No acute cardiopulmonary findings.    Signer Name: Kris Quintana MD   Signed: 2/29/2020 9:44 PM   Workstation Name: [email or web address]    [facility]    XR Hip With or Without Pelvis 2 - 3 View Left [[id number]] Collected:  02/29/20 2038     Updated:  02/29/20 2041    Narrative:       CR Hip Uni Comp Min 2 Vws LT 2/29/2020    INDICATION:   Left hip pain status post fall today.    COMPARISON:   None available.    FINDINGS:  AP and frog-leg lateral view(s) of the left hip.  There is an impacted fracture involving the subcapital region of the left femoral neck. No other fracture is seen. The bones are normally mineralized. There is no soft tissue abnormality. No bone erosion  or destruction.        Impression:       Impacted fracture through the subcapital region of the left femoral neck.    Signer Name: Kris Quintana MD   Signed: 2/29/2020 8:38 PM   Workstation Name: [id number]    [facility]    XR Ankle 3+ View Left [[id number]] Collected:  02/29/20 2037     Updated:  02/29/20 2040    Narrative:       CR Ankle Min 3 Vws LT 2/29/2020    INDICATION:   Left ankle pain status post fall today..    COMPARISON:   None.    FINDINGS:  3 view(s) of the left ankle.  No fracture or dislocation. No bone erosion or destruction. No foreign body. Osteopenia. Soft tissue swelling overlying the lateral malleolus.        Impression:       Lateral soft tissue swelling. No fracture.    Signer Name: Kris Quintana MD   Signed: 2/29/2020 8:37 PM   Workstation Name: [email or web address]    [facility]            Condition on Discharge:  Stable     Vital Signs  Temp:  [97.3 °F (36.3 °C)-99.2 °F (37.3 °C)]  99.2 °F (37.3 °C)  Heart Rate:  [81-94] 88  Resp:  [18] 18  BP: (153-200)/(66-80) 153/78    Physical Exam:  Physical Exam   Constitutional: No distress.   Pulmonary/Chest: Effort normal. No respiratory distress.   Abdominal: Soft. She exhibits no distension.   Musculoskeletal:   Dressing to the hip clean dry and intact   Neurological:   Alert x1   Skin: Skin is warm and dry.   Psychiatric: She has a normal mood and affect.       Discharge Disposition  Rehab Facility or Unit (DC - External)    Discharge Medications     Discharge Medications      New Medications      Instructions Start Date   OLANZapine 10 MG tablet  Commonly known as:  zyPREXA   10 mg, Oral, Daily With Dinner      sennosides-docusate 8.6-50 MG per tablet  Commonly known as:  PERICOLACE   2 tablets, Oral, 2 Times Daily         Continue These Medications      Instructions Start Date   aspirin 81 MG chewable tablet   81 mg, Oral, Daily      benzonatate 100 MG capsule  Commonly known as:  TESSALON   100 mg, Oral, 3 Times Daily PRN      BRINZOLAMIDE - BRIMONIDONE 1-0.2% OPTHALMIC SUSPENSION   0.2 %, Right Eye, 2 Times Daily      CALCIUM 500 + D PO   1 tablet, Oral, 2 Times Daily      CENTRUM SILVER PO   1 tablet, Oral, Daily      clotrimazole-betamethasone 1-0.05 % cream  Commonly known as:  LOTRISONE   1 application, Topical, 2 Times Daily PRN      dicyclomine 10 MG capsule  Commonly known as:  BENTYL   10 mg, Oral, 4 Times Daily Before Meals & Nightly PRN      dilTIAZem 30 MG tablet  Commonly known as:  CARDIZEM   30 mg, Oral, 2 Times Daily      dorzolamide 2 % ophthalmic solution  Commonly known as:  TRUSOPT   1 drop, Right Eye, 2 Times Daily      fluticasone 50 MCG/ACT nasal spray  Commonly known as:  FLONASE   2 sprays, Nasal, Daily      guaiFENesin 600 MG 12 hr tablet  Commonly known as:  MUCINEX   600 mg, Oral, Every 12 Hours PRN      hydrocortisone-pramoxine 1-1 % rectal foam  Commonly known as:  PROCTOFOAM-HS   1 applicator, Rectal, 2 Times  Daily      latanoprost 0.005 % ophthalmic solution  Commonly known as:  XALATAN   1 drop, Right Eye, Nightly      levocetirizine 5 MG tablet  Commonly known as:  XYZAL   5 mg, Oral, Every Evening      lisinopril 20 MG tablet  Commonly known as:  PRINIVIL,ZESTRIL   20 mg, Oral, Daily, Take one half tablet daily for 1 week then advanced to one full tablet daily      LORazepam 1 MG tablet  Commonly known as:  ATIVAN   1 mg, Oral, 3 Times Daily, Takes 1 in AM; 2 at HS.  Per daughter in law pt PMD told pt she can take an extra at 5pm      meclizine 12.5 MG tablet  Commonly known as:  ANTIVERT   12.5 mg, Oral, 3 Times Daily PRN      pantoprazole 40 MG EC tablet  Commonly known as:  PROTONIX   40 mg, Oral, Daily      PROBIOTIC-10 PO   Oral      simethicone 125 MG chewable tablet  Commonly known as:  MYLICON   125 mg, Oral, Every 6 Hours PRN      SYNTHROID 50 MCG tablet  Generic drug:  levothyroxine   50 mcg, Oral, Daily      timolol 0.5 % ophthalmic solution  Commonly known as:  TIMOPTIC   1 drop, Right Eye, 2 Times Daily      ZANTAC 150 MG tablet  Generic drug:  raNITIdine   150 mg, Oral, 2 Times Daily         Stop These Medications    moxifloxacin 400 MG tablet  Commonly known as:  AVELOX            Discharge Diet:   Diet Instructions     Diet: Regular      Discharge Diet:  Regular          Activity at Discharge:     Follow-up Appointments  No future appointments.  Additional Instructions for the Follow-ups that You Need to Schedule     Discharge Follow-up with Specified Provider: ortho; 2 Weeks   As directed      To:  ortho    Follow Up:  2 Weeks               Test Results Pending at Discharge       Risk for Readmission (LACE) Score: 12 (3/7/2020  6:00 AM)          Time: Discharge 36 min with face-to-face history exam, writing of prescriptions, and documenting discharge data including care coordination.      Juan Jose Venegas DO  03/07/20  9:14 AM

## 2020-03-07 NOTE — PLAN OF CARE
Problem: Patient Care Overview  Goal: Plan of Care Review  Outcome: Ongoing (interventions implemented as appropriate)  Flowsheets (Taken 3/7/2020 2887)  Progress: no change  Plan of Care Reviewed With: patient; family  Outcome Summary: Patient remains confused and restless. Family at patient bedside. Patient able to take meds crushed in applesauce. Resting at this time.

## 2020-03-07 NOTE — THERAPY DISCHARGE NOTE
Acute Care - Physical Therapy Treatment Note/Discharge   [place]     Patient Name: Paulo Hampton  : 1930  MRN: [id number]  Today's Date: 3/7/2020  Onset of Illness/Injury or Date of Surgery: 20(surgery 3/2/20)  Date of Referral to PT: 20  Referring Physician: Dr. Dickinson    Admit Date: 2020    Visit Dx:    ICD-10-CM ICD-9-CM   1. Closed fracture of left hip, initial encounter (CMS/Formerly [facility]) S72.002A 820.8   2. Fall, initial encounter W19.XXXA E888.9   3. Skin avulsion T14.8XXA 879.8   4. Sprain of left ankle, unspecified ligament, initial encounter S93.402A 845.00   5. 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 (CMS/Formerly [facility])         Rehab Goal Summary     Row Name 20 0910             Physical Therapy Goals    Bed Mobility Goal Selection (PT)  bed mobility, PT goal 1  -KM      Transfer Goal Selection (PT)  transfer, PT goal 1;transfer, PT goal 2  -KM         Bed Mobility Goal 1 (PT)    Activity/Assistive Device (Bed Mobility Goal 1, PT)  bed mobility activities, all  -KM      [place] Level/Cues Needed (Bed Mobility Goal 1, PT)  moderate assist (50-74% patient effort);2 person assist  -KM      Time Frame (Bed Mobility Goal 1, PT)  5 days  -KM      Progress/Outcomes (Bed Mobility Goal 1, PT)  goal ongoing;discharged from facility  -KM         Transfer Goal 1 (PT)    Activity/Assistive Device (Transfer Goal 1, PT)  sit-to-stand/stand-to-sit;walker, rolling  -KM      [place] Level/Cues Needed (Transfer Goal 1, PT)  moderate assist (50-74% patient effort);2 person assist  -KM      Time Frame (Transfer Goal 1, PT)  5 days  -KM      Progress/Outcome  (Transfer Goal 1, PT)  goal ongoing;discharged from facility  -KM         Transfer Goal 2 (PT)    Activity/Assistive Device (Transfer Goal 2, PT)  bed-to-chair/chair-to-bed;walker, rolling  -KM      [place] Level/Cues Needed (Transfer Goal 2, PT)  maximum assist (25-49% patient effort);2 person assist  -KM      Time Frame (Transfer Goal 2, PT)  5 days  -KM      Progress/Outcome (Transfer Goal 2, PT)  goal ongoing;discharged from facility  -KM        User Key  (r) = Recorded By, (t) = Taken By, (c) = Cosigned By    Initials Name Provider Type Discipline    KM Aaliyah Bender PTA Physical Therapy Assistant PT        Therapy Treatment  Rehabilitation Treatment Summary     Row Name 03/07/20 0910             Treatment Time/Intention    Discipline  physical therapy assistant  -KM      Document Type  discharge treatment  -KM      Subjective Information  complains of;weakness;fatigue  -KM      Mode of Treatment  physical therapy  -KM      Patient/Family Observations  Pt agreeable to PT, family member present  -KM      Patient Effort  good  -KM      Patient Response to Treatment  Pt needed additional cues and time to complete tasks. Fatigue noted at completion of session  -KM      Recorded by [KM] Aaliyah Bender PTA 03/07/20 1045      Row Name 03/07/20 0910             Cognitive Assessment/Intervention- PT/OT    Personal Safety Interventions  fall prevention program maintained;gait belt;nonskid shoes/slippers when out of bed  -KM      Recorded by [KM] Aaliyah Bender PTA 03/07/20 1045      Row Name 03/07/20 0910             Bed Mobility Assessment/Treatment    Bed Mobility Assessment/Treatment  supine-sit;sit-supine  -KM      Supine-Sit [place] (Bed Mobility)  maximum assist (25% patient effort);2 person assist  -KM      Sit-Supine [place] (Bed Mobility)  dependent (less than 25% patient effort)  -KM      Bed Mobility, Safety Issues  cognitive deficits limit understanding;decreased use of arms for  pushing/pulling;decreased use of legs for bridging/pushing  -KM      Comment (Bed Mobility)  Pt required Max x 2 supine>sit with slight posterior lean sitting at EOB.   -KM      Recorded by [KM] Aaliyah Bender, PTA 03/07/20 1045      Row Name 03/07/20 0910             Transfer Assessment/Treatment    Transfer Assessment/Treatment  sit-stand transfer;stand-sit transfer  -KM      Comment (Transfers)  Pt able to understand process of transfer but required Max A x 2.  -KM      Recorded by [KM] Aaliyah Bender, PTA 03/07/20 1045      Row Name 03/07/20 0910             Sit-Stand Transfer    Sit-Stand [place] (Transfers)  maximum assist (25% patient effort);2 person assist  -KM      Assistive Device (Sit-Stand Transfers)  walker, front-wheeled  -KM      Recorded by [KM] Aaliyah Bender, PTA 03/07/20 1045      Row Name 03/07/20 0910             Stand-Sit Transfer    Stand-Sit [place] (Transfers)  maximum assist (25% patient effort);2 person assist  -KM      Assistive Device (Stand-Sit Transfers)  walker, front-wheeled  -KM      Recorded by [KM] Aaliyah Bender, PTA 03/07/20 1045      Row Name 03/07/20 0910             Gait/Stairs Assessment/Training    Comment (Gait/Stairs)  Unable to attempt at this time  -KM      Recorded by [KM] Aaliyah Bender, PTA 03/07/20 1045      Row Name 03/07/20 0910             Static Sitting Balance    Level of [place] (Unsupported Sitting, Static Balance)  minimal assist, 75% patient effort;moderate assist, 50 to 74% patient effort  -KM      Comment (Unsupported Sitting, Static Balance)  Pt initially required Mod A to sit at EOB but was able to maintain posture with Min A. Pt was able to sit at EOB x 3-4 minutes.   -KM      Recorded by [KM] Aaliyah Bender, PTA 03/07/20 1045      Row Name 03/07/20 0910             Static Standing Balance    Level of [place] (Supported Standing, Static Balance)  moderate assist, 50 to 74% patient effort;maximal assist, 25 to 49% patient effort;2  person assist  -KM      Time Able to Maintain Position (Supported Standing, Static Balance)  4 to 5 minutes  -KM      Assistive Device Utilized (Supported Standing, Static Balance)  walker, rolling  -KM      Comment (Supported Standing, Static Balance)  Pt able to maintain static stading balance Mod A x 2 with additional cues. Pt with forward flexed posture  -KM      Recorded by [KM] Aaliyah Bender PTA 03/07/20 1045      Row Name 03/07/20 0910             Positioning and Restraints    Pre-Treatment Position  in bed  -KM      Post Treatment Position  bed  -KM      In Bed  supine;call light within reach;with family/caregiver;exit alarm on;with nsg;ABD pillow  -KM      Recorded by [KM] Aaliyah Bender PTA 03/07/20 1045      Row Name                Wound 03/01/20 0907 Left elbow Skin Tear    Wound - Properties Group Date first assessed: 03/01/20 [MD] Time first assessed: 0907 [MD] Present on Hospital Admission: Y [MD] Side: Left [MD] Location: elbow [MD] Primary Wound Type: Skin tear [MD] Stage, Pressure Injury: other (see comments) [MD] Additional Comments: From fall pre-admission [MD] Recorded by:  [MD] Jackelyn Jean RN 03/01/20 0908    Row Name                Wound 03/02/20 1621 Left lateral hip Incision    Wound - Properties Group Date first assessed: 03/02/20 [ME] Time first assessed: 1621 [ME] Side: Left [ME] Orientation: lateral [ME] Location: hip [ME] Primary Wound Type: Incision [ME] Additional Comments: prineo, telfa, tegaderms, abduction pillow [ME] Recorded by:  [ME] Clary Asif RN 03/02/20 1621    Row Name 03/07/20 0910             Plan of Care Review    Plan of Care Reviewed With  patient;family  -KM      Progress  improving  -KM      Outcome Summary  Pt seems pleasantly confused and agreeable to PT; family member present.  Pt completed supine>sit Max A x 2 and dependent for sit>supine. Pt initially required Mod A to sit at EOB but was able to maintain with Min A x 3-4 minutes. Additional cues and  time required to complete tasks. Pt completed sit<>stand Mod-Max A x 2 with bed elevated. Pt maintained static standing balance x 5 minutes Mod A x 2 with FWW. Pt with fatigue at completion of session but demonstrated improved response to PT at this time and hip precautions maintained. Plan to discharge to SNF.   -KM      Recorded by [HENOK] Aaliyah Bender PTA 03/07/20 1045        User Key  (r) = Recorded By, (t) = Taken By, (c) = Cosigned By    Initials Name Effective Dates Discipline    KM Aaliyah Bedner PTA 02/05/19 -  PT    Clary Coulter, RN 09/30/16 -  Nurse    Jackelyn Kwon RN 07/18/19 -  Nurse        Wound 03/01/20 0907 Left elbow Skin Tear (Active)   Dressing Appearance intact;dry 3/7/2020  9:00 AM   Closure Open to air 3/7/2020  9:00 AM   Base dry;scab 3/7/2020  9:00 AM   Drainage Amount none 3/7/2020  9:00 AM       Wound 03/02/20 1621 Left lateral hip Incision (Active)   Dressing Appearance dry;intact;no drainage 3/7/2020  9:00 AM   Closure HEATH 3/7/2020  9:00 AM   Periwound intact;ecchymotic 3/7/2020  9:00 AM   Periwound Temperature warm 3/7/2020  9:00 AM   Periwound Skin Turgor soft 3/7/2020  9:00 AM   Edges rolled/closed 3/7/2020  9:00 AM   Drainage Amount none 3/7/2020  9:00 AM       PT Recommendation and Plan  Anticipated Discharge Disposition (PT): skilled nursing facility  Outcome Summary/Treatment Plan (PT)  Anticipated Discharge Disposition (PT): skilled nursing facility  Plan of Care Reviewed With: patient, family  Progress: improving  Outcome Summary: Pt seems pleasantly confused and agreeable to PT; family member present.  Pt completed supine>sit Max A x 2 and dependent for sit>supine. Pt initially required Mod A to sit at EOB but was able to maintain with Min A x 3-4 minutes. Additional cues and time required to complete tasks. Pt completed sit<>stand Mod-Max A x 2 with bed elevated. Pt maintained static standing balance x 5 minutes Mod A x 2 with FWW. Pt with fatigue at completion of  session but demonstrated improved response to PT at this time and hip precautions maintained. Plan to discharge to SNF.     Outcome Measures     Row Name 03/07/20 0910 03/05/20 0838 03/05/20 0815       How much help from another person do you currently need...    Turning from your back to your side while in flat bed without using bedrails?  1  -KM  --  1  -JW    Moving from lying on back to sitting on the side of a flat bed without bedrails?  2  -KM  --  1  -JW    Moving to and from a bed to a chair (including a wheelchair)?  1  -KM  --  1  -JW    Standing up from a chair using your arms (e.g., wheelchair, bedside chair)?  2  -KM  --  1  -JW    Climbing 3-5 steps with a railing?  1  -KM  --  1  -JW    To walk in hospital room?  1  -KM  --  1  -JW    AM-PAC 6 Clicks Score (PT)  8  -KM  --  6  -JW       How much help from another is currently needed...    Putting on and taking off regular lower body clothing?  --  1  -SD (r) GC (t) SD (c)  --    Bathing (including washing, rinsing, and drying)  --  1  -SD (r) GC (t) SD (c)  --    Toileting (which includes using toilet bed pan or urinal)  --  1  -SD (r) GC (t) SD (c)  --    Putting on and taking off regular upper body clothing  --  1  -SD (r) GC (t) SD (c)  --    Taking care of personal grooming (such as brushing teeth)  --  2  -SD (r) GC (t) SD (c)  --    Eating meals  --  2  -SD (r) GC (t) SD (c)  --    AM-PAC 6 Clicks Score (OT)  --  8  -SD (r) GC (t)  --       Functional Assessment    Outcome Measure Options  AM-PAC 6 Clicks Basic Mobility (PT)  -KM  AM-PAC 6 Clicks Daily Activity (OT)  -SD (r) GC (t) SD (c)  AM-PAC 6 Clicks Basic Mobility (PT)  -JW    Row Name 03/04/20 1100             Functional Assessment    Outcome Measure Options  AM-PAC 6 Clicks Daily Activity (OT)  -EN        User Key  (r) = Recorded By, (t) = Taken By, (c) = Cosigned By    Initials Name Provider Type    Concepcion Willard, OTR Occupational Therapist    John Vides, OTR  Occupational Therapist    Rosa Nieves, PT Physical Therapist    Aaliyah Newton PTA Physical Therapy Assistant    Conchis Hogan, OT Student OT Student           Time Calculation:   PT Charges     Row Name 03/07/20 1051             Time Calculation    Start Time  0910  -KM      Stop Time  0940  -KM      Time Calculation (min)  30 min  -KM        User Key  (r) = Recorded By, (t) = Taken By, (c) = Cosigned By    Initials Name Provider Type    Aaliyah Newton PTA Physical Therapy Assistant        Therapy Charges for Today     Code Description Service Date Service Provider Modifiers Qty    28133105539 HC PT THER PROC EA 15 MIN 3/7/2020 Aaliyah Bender PTA GP 2    21643849653 HC PT THER SUPP EA 15 MIN 3/7/2020 Aaliyah Bender PTA GP 1          PT G-Codes  Outcome Measure Options: AM-PAC 6 Clicks Basic Mobility (PT)  AM-PAC 6 Clicks Score (PT): 8  AM-PAC 6 Clicks Score (OT): 8    PT Discharge Summary  Anticipated Discharge Disposition (PT): skilled nursing facility  Reason for Discharge: Discharge from facility  Discharge Destination: SNF    Aaliyah Bender PTA  3/7/2020

## 2020-03-07 NOTE — NURSING NOTE
Case Management Discharge Note      Final Note: D/C to [facility]    Provided Post Acute Provider List?: N/A  N/A Provider List Comment: will follow post surgery    Destination      No service has been selected for the patient.      Durable Medical Equipment      No service has been selected for the patient.      Dialysis/Infusion      No service has been selected for the patient.      Home Medical Care      No service has been selected for the patient.      Therapy      No service has been selected for the patient.      Community Resources      No service has been selected for the patient.             Final Discharge Disposition Code: 03 - skilled nursing facility (SNF)

## 2020-03-26 ENCOUNTER — TELEPHONE (OUTPATIENT)
Dept: ORTHOPEDIC SURGERY | Facility: CLINIC | Age: 85
End: 2020-03-26

## 2020-03-26 NOTE — TELEPHONE ENCOUNTER
Granddaughter who cares for the patient wants to know if you can lift any restrictions on pt.  She is doing well but cant come out because of the virus going around.

## 2020-03-30 ENCOUNTER — LAB REQUISITION (OUTPATIENT)
Dept: LAB | Facility: HOSPITAL | Age: 85
End: 2020-03-30

## 2020-03-30 DIAGNOSIS — R41.0 DISORIENTATION, UNSPECIFIED: ICD-10-CM

## 2020-03-30 DIAGNOSIS — R53.82 CHRONIC FATIGUE, UNSPECIFIED: ICD-10-CM

## 2020-03-30 DIAGNOSIS — R09.81 NASAL CONGESTION: ICD-10-CM

## 2020-03-30 DIAGNOSIS — R05.9 COUGH: ICD-10-CM

## 2020-03-30 LAB
BACTERIA UR QL AUTO: ABNORMAL /HPF
BILIRUB UR QL STRIP: NEGATIVE
CLARITY UR: CLEAR
COLOR UR: ABNORMAL
GLUCOSE UR STRIP-MCNC: NEGATIVE MG/DL
HGB UR QL STRIP.AUTO: ABNORMAL
HYALINE CASTS UR QL AUTO: ABNORMAL /LPF
KETONES UR QL STRIP: NEGATIVE
LEUKOCYTE ESTERASE UR QL STRIP.AUTO: NEGATIVE
NITRITE UR QL STRIP: NEGATIVE
PH UR STRIP.AUTO: 5.5 [PH] (ref 4.5–8)
PROT UR QL STRIP: NEGATIVE
RBC # UR: ABNORMAL /HPF
REF LAB TEST METHOD: ABNORMAL
SP GR UR STRIP: <=1.005 (ref 1–1.03)
SQUAMOUS #/AREA URNS HPF: ABNORMAL /HPF
UROBILINOGEN UR QL STRIP: ABNORMAL
WBC UR QL AUTO: ABNORMAL /HPF

## 2020-03-30 PROCEDURE — 81001 URINALYSIS AUTO W/SCOPE: CPT | Performed by: FAMILY MEDICINE

## 2020-03-30 NOTE — TELEPHONE ENCOUNTER
What restrictions is she wanting lifted? She should be fully weightbearing at this time. Still needs to practice posterior hip precautions for another month

## 2020-03-30 NOTE — TELEPHONE ENCOUNTER
Suki, the granddaughter, was in agreement with the plan.  She states home health was going to get a mobile xray this week on the patient and they will send us those results.

## 2020-04-07 ENCOUNTER — LAB REQUISITION (OUTPATIENT)
Dept: LAB | Facility: HOSPITAL | Age: 85
End: 2020-04-07

## 2020-04-07 DIAGNOSIS — I73.9 PERIPHERAL VASCULAR DISEASE, UNSPECIFIED (HCC): ICD-10-CM

## 2020-04-07 DIAGNOSIS — I10 ESSENTIAL (PRIMARY) HYPERTENSION: ICD-10-CM

## 2020-04-07 DIAGNOSIS — E03.9 HYPOTHYROIDISM, UNSPECIFIED: ICD-10-CM

## 2020-04-07 DIAGNOSIS — E78.5 HYPERLIPIDEMIA, UNSPECIFIED: ICD-10-CM

## 2020-04-07 DIAGNOSIS — E03.1 CONGENITAL HYPOTHYROIDISM WITHOUT GOITER: ICD-10-CM

## 2020-04-07 DIAGNOSIS — I48.91 UNSPECIFIED ATRIAL FIBRILLATION (HCC): ICD-10-CM

## 2020-04-07 LAB
ALBUMIN SERPL-MCNC: 3.5 G/DL (ref 3.5–5.2)
ALBUMIN/GLOB SERPL: 1.4 G/DL
ALP SERPL-CCNC: 62 U/L (ref 39–117)
ALT SERPL W P-5'-P-CCNC: 7 U/L (ref 1–33)
ANION GAP SERPL CALCULATED.3IONS-SCNC: 9.6 MMOL/L (ref 5–15)
AST SERPL-CCNC: 16 U/L (ref 1–32)
BASOPHILS # BLD AUTO: 0.01 10*3/MM3 (ref 0–0.2)
BASOPHILS NFR BLD AUTO: 0.2 % (ref 0–1.5)
BILIRUB SERPL-MCNC: 0.3 MG/DL (ref 0.2–1.2)
BUN BLD-MCNC: 10 MG/DL (ref 8–23)
BUN/CREAT SERPL: 15.2 (ref 7–25)
CALCIUM SPEC-SCNC: 8.9 MG/DL (ref 8.6–10.5)
CHLORIDE SERPL-SCNC: 104 MMOL/L (ref 98–107)
CO2 SERPL-SCNC: 27.4 MMOL/L (ref 22–29)
CREAT BLD-MCNC: 0.66 MG/DL (ref 0.57–1)
DEPRECATED RDW RBC AUTO: 44.4 FL (ref 37–54)
EOSINOPHIL # BLD AUTO: 0.21 10*3/MM3 (ref 0–0.4)
EOSINOPHIL NFR BLD AUTO: 3.3 % (ref 0.3–6.2)
ERYTHROCYTE [DISTWIDTH] IN BLOOD BY AUTOMATED COUNT: 13.2 % (ref 12.3–15.4)
GFR SERPL CREATININE-BSD FRML MDRD: 84 ML/MIN/1.73
GLOBULIN UR ELPH-MCNC: 2.5 GM/DL
GLUCOSE BLD-MCNC: 113 MG/DL (ref 65–99)
HCT VFR BLD AUTO: 32.7 % (ref 34–46.6)
HGB BLD-MCNC: 10.2 G/DL (ref 12–15.9)
IMM GRANULOCYTES # BLD AUTO: 0.02 10*3/MM3 (ref 0–0.05)
IMM GRANULOCYTES NFR BLD AUTO: 0.3 % (ref 0–0.5)
LYMPHOCYTES # BLD AUTO: 3.05 10*3/MM3 (ref 0.7–3.1)
LYMPHOCYTES NFR BLD AUTO: 47.6 % (ref 19.6–45.3)
MCH RBC QN AUTO: 28.9 PG (ref 26.6–33)
MCHC RBC AUTO-ENTMCNC: 31.2 G/DL (ref 31.5–35.7)
MCV RBC AUTO: 92.6 FL (ref 79–97)
MONOCYTES # BLD AUTO: 0.49 10*3/MM3 (ref 0.1–0.9)
MONOCYTES NFR BLD AUTO: 7.6 % (ref 5–12)
NEUTROPHILS # BLD AUTO: 2.63 10*3/MM3 (ref 1.7–7)
NEUTROPHILS NFR BLD AUTO: 41 % (ref 42.7–76)
NRBC BLD AUTO-RTO: 0 /100 WBC (ref 0–0.2)
PLATELET # BLD AUTO: 279 10*3/MM3 (ref 140–450)
PMV BLD AUTO: 10.9 FL (ref 6–12)
POTASSIUM BLD-SCNC: 3.4 MMOL/L (ref 3.5–5.2)
PROT SERPL-MCNC: 6 G/DL (ref 6–8.5)
RBC # BLD AUTO: 3.53 10*6/MM3 (ref 3.77–5.28)
SODIUM BLD-SCNC: 141 MMOL/L (ref 136–145)
TSH SERPL DL<=0.05 MIU/L-ACNC: 0.79 UIU/ML (ref 0.27–4.2)
WBC NRBC COR # BLD: 6.41 10*3/MM3 (ref 3.4–10.8)

## 2020-04-07 PROCEDURE — 84443 ASSAY THYROID STIM HORMONE: CPT | Performed by: FAMILY MEDICINE

## 2020-04-07 PROCEDURE — 80053 COMPREHEN METABOLIC PANEL: CPT | Performed by: FAMILY MEDICINE

## 2020-04-07 PROCEDURE — 85025 COMPLETE CBC W/AUTO DIFF WBC: CPT | Performed by: FAMILY MEDICINE

## 2020-04-30 ENCOUNTER — TELEPHONE (OUTPATIENT)
Dept: ORTHOPEDIC SURGERY | Facility: CLINIC | Age: 85
End: 2020-04-30

## 2020-04-30 ENCOUNTER — OFFICE VISIT (OUTPATIENT)
Dept: ORTHOPEDIC SURGERY | Facility: CLINIC | Age: 85
End: 2020-04-30

## 2020-04-30 VITALS — WEIGHT: 135 LBS | HEIGHT: 62 IN | BODY MASS INDEX: 24.84 KG/M2

## 2020-04-30 DIAGNOSIS — S72.002D CLOSED FRACTURE OF LEFT HIP WITH ROUTINE HEALING, SUBSEQUENT ENCOUNTER: Primary | ICD-10-CM

## 2020-04-30 PROCEDURE — 73502 X-RAY EXAM HIP UNI 2-3 VIEWS: CPT | Performed by: ORTHOPAEDIC SURGERY

## 2020-04-30 PROCEDURE — 99024 POSTOP FOLLOW-UP VISIT: CPT | Performed by: ORTHOPAEDIC SURGERY

## 2020-04-30 RX ORDER — TRAMADOL HYDROCHLORIDE 50 MG/1
50 TABLET ORAL EVERY 8 HOURS PRN
COMMUNITY
Start: 2020-04-15

## 2020-04-30 RX ORDER — MIRTAZAPINE 15 MG/1
15 TABLET, FILM COATED ORAL NIGHTLY
COMMUNITY

## 2020-04-30 RX ORDER — ACETAMINOPHEN 500 MG
1000 TABLET ORAL DAILY PRN
COMMUNITY

## 2020-04-30 NOTE — PROGRESS NOTES
DIAGNOSIS: F/u s/p left hip hemiarthroplasty for treatment of femoral neck fracture , DOS 3/2/2020    SUBJECTIVE:Patient returns today for 8 week follow up of left hip replacement. Patient reports continuing to do well no unusual complaints. Appears to be progressing appropriately. Denies fevers, chills, or sweats, no redness or drainage from incision. Denies calf pain or swelling to bilateral lower extremities. She has been using a walker for ambulation.     EXAM:  Left hip- incision well healed   No erythema, fluctuance, or subcutaneous fluid collection   Hip flexion 120 degrees    Hip extension 0 degrees    ER 35 degrees    IR 25 degrees    Strength 4/5 strength on all planes of motion   No calf pain, negative Homans sign bilateral lower extremities   Positive sensation left foot    IMAGING  AP pelvis and frog lateral view of left hip, ordered and reviewed by me today, indication status post hip hemiarthroplasty, compared to prior postoperative x-rays.  Findings include stable hemiarthroplasty components with no evidence of loosening or osteolysis surrounding femoral stem, no evidence of joint space narrowing at the acetabulum or periprosthetic fracture.     ASSESSMENT: status post left hip hemiarthroplasty.    PLAN:   1.  Continue work on range of motion and strengthening.   2.  Continue using walker and may progress to cane as tolerated.  3.  Patient will need antibiotic prophylaxis with dental or endoscopy procedures.  4.  Follow-up in 6 weeks, no x-rays needed.  5.  All questions answered today.    By signing my name here, I Maria Gallo, attest that all documentation on 04/30/20 at 10:54 has been prepared under the direction and in the presence of Dr. Jeff Dickinson.    I, Dr. Jeff Dickinson, personally performed the services described in this documentation, as scribed by Maria Gallo, in my presence, and it is both accurate and complete.

## 2020-05-12 ENCOUNTER — TELEPHONE (OUTPATIENT)
Dept: ORTHOPEDIC SURGERY | Facility: CLINIC | Age: 85
End: 2020-05-12

## 2020-05-12 RX ORDER — CLINDAMYCIN HYDROCHLORIDE 300 MG/1
CAPSULE ORAL
Qty: 2 CAPSULE | Refills: 3 | Status: SHIPPED | OUTPATIENT
Start: 2020-05-12

## 2023-01-12 ENCOUNTER — HOSPITAL ENCOUNTER (EMERGENCY)
Facility: HOSPITAL | Age: 88
Discharge: HOME | End: 2023-01-12
Attending: EMERGENCY MEDICINE | Admitting: EMERGENCY MEDICINE
Payer: MEDICARE

## 2023-01-12 ENCOUNTER — APPOINTMENT (OUTPATIENT)
Dept: GENERAL RADIOLOGY | Facility: HOSPITAL | Age: 88
End: 2023-01-12
Payer: MEDICARE

## 2023-01-12 VITALS
HEIGHT: 62 IN | DIASTOLIC BLOOD PRESSURE: 57 MMHG | TEMPERATURE: 98.2 F | SYSTOLIC BLOOD PRESSURE: 139 MMHG | WEIGHT: 135 LBS | RESPIRATION RATE: 18 BRPM | BODY MASS INDEX: 24.84 KG/M2 | HEART RATE: 73 BPM | OXYGEN SATURATION: 98 %

## 2023-01-12 DIAGNOSIS — E86.0 DEHYDRATION: Primary | ICD-10-CM

## 2023-01-12 DIAGNOSIS — M79.605 PAIN OF LEFT LOWER EXTREMITY: ICD-10-CM

## 2023-01-12 LAB
ALBUMIN SERPL-MCNC: 3.6 G/DL (ref 3.5–5.2)
ALBUMIN/GLOB SERPL: 1.2 G/DL
ALP SERPL-CCNC: 76 U/L (ref 39–117)
ALT SERPL W P-5'-P-CCNC: 14 U/L (ref 1–33)
ANION GAP SERPL CALCULATED.3IONS-SCNC: 9.7 MMOL/L (ref 5–15)
AST SERPL-CCNC: 26 U/L (ref 1–32)
BASOPHILS # BLD AUTO: 0.02 10*3/MM3 (ref 0–0.2)
BASOPHILS NFR BLD AUTO: 0.2 % (ref 0–1.5)
BILIRUB SERPL-MCNC: 0.3 MG/DL (ref 0–1.2)
BILIRUB UR QL STRIP: NEGATIVE
BUN SERPL-MCNC: 27 MG/DL (ref 8–23)
BUN/CREAT SERPL: 12.8 (ref 7–25)
CALCIUM SPEC-SCNC: 9.1 MG/DL (ref 8.2–9.6)
CHLORIDE SERPL-SCNC: 104 MMOL/L (ref 98–107)
CLARITY UR: CLEAR
CO2 SERPL-SCNC: 22.3 MMOL/L (ref 22–29)
COLOR UR: YELLOW
CREAT SERPL-MCNC: 2.11 MG/DL (ref 0.57–1)
DEPRECATED RDW RBC AUTO: 44.8 FL (ref 37–54)
EGFRCR SERPLBLD CKD-EPI 2021: 21.6 ML/MIN/1.73
EOSINOPHIL # BLD AUTO: 0.43 10*3/MM3 (ref 0–0.4)
EOSINOPHIL NFR BLD AUTO: 5.1 % (ref 0.3–6.2)
ERYTHROCYTE [DISTWIDTH] IN BLOOD BY AUTOMATED COUNT: 13 % (ref 12.3–15.4)
GLOBULIN UR ELPH-MCNC: 3 GM/DL
GLUCOSE SERPL-MCNC: 93 MG/DL (ref 65–99)
GLUCOSE UR STRIP-MCNC: NEGATIVE MG/DL
HCT VFR BLD AUTO: 36.7 % (ref 34–46.6)
HGB BLD-MCNC: 11.9 G/DL (ref 12–15.9)
HGB UR QL STRIP.AUTO: NEGATIVE
IMM GRANULOCYTES # BLD AUTO: 0.01 10*3/MM3 (ref 0–0.05)
IMM GRANULOCYTES NFR BLD AUTO: 0.1 % (ref 0–0.5)
KETONES UR QL STRIP: NEGATIVE
LEUKOCYTE ESTERASE UR QL STRIP.AUTO: NEGATIVE
LYMPHOCYTES # BLD AUTO: 2.36 10*3/MM3 (ref 0.7–3.1)
LYMPHOCYTES NFR BLD AUTO: 28 % (ref 19.6–45.3)
MCH RBC QN AUTO: 30.1 PG (ref 26.6–33)
MCHC RBC AUTO-ENTMCNC: 32.4 G/DL (ref 31.5–35.7)
MCV RBC AUTO: 92.7 FL (ref 79–97)
MONOCYTES # BLD AUTO: 0.62 10*3/MM3 (ref 0.1–0.9)
MONOCYTES NFR BLD AUTO: 7.4 % (ref 5–12)
NEUTROPHILS NFR BLD AUTO: 4.98 10*3/MM3 (ref 1.7–7)
NEUTROPHILS NFR BLD AUTO: 59.2 % (ref 42.7–76)
NITRITE UR QL STRIP: NEGATIVE
PH UR STRIP.AUTO: 7 [PH] (ref 4.5–8)
PLATELET # BLD AUTO: 206 10*3/MM3 (ref 140–450)
PMV BLD AUTO: 11 FL (ref 6–12)
POTASSIUM SERPL-SCNC: 4.3 MMOL/L (ref 3.5–5.2)
PROT SERPL-MCNC: 6.6 G/DL (ref 6–8.5)
PROT UR QL STRIP: NEGATIVE
RBC # BLD AUTO: 3.96 10*6/MM3 (ref 3.77–5.28)
SODIUM SERPL-SCNC: 136 MMOL/L (ref 136–145)
SP GR UR STRIP: 1.02 (ref 1–1.03)
UROBILINOGEN UR QL STRIP: NORMAL
WBC NRBC COR # BLD: 8.42 10*3/MM3 (ref 3.4–10.8)

## 2023-01-12 PROCEDURE — 99284 EMERGENCY DEPT VISIT MOD MDM: CPT

## 2023-01-12 PROCEDURE — 81003 URINALYSIS AUTO W/O SCOPE: CPT | Performed by: EMERGENCY MEDICINE

## 2023-01-12 PROCEDURE — P9612 CATHETERIZE FOR URINE SPEC: HCPCS

## 2023-01-12 PROCEDURE — 73552 X-RAY EXAM OF FEMUR 2/>: CPT

## 2023-01-12 PROCEDURE — 72170 X-RAY EXAM OF PELVIS: CPT

## 2023-01-12 PROCEDURE — 85025 COMPLETE CBC W/AUTO DIFF WBC: CPT | Performed by: EMERGENCY MEDICINE

## 2023-01-12 PROCEDURE — 73590 X-RAY EXAM OF LOWER LEG: CPT

## 2023-01-12 PROCEDURE — 80053 COMPREHEN METABOLIC PANEL: CPT | Performed by: EMERGENCY MEDICINE

## 2023-01-12 RX ORDER — SODIUM CHLORIDE 0.9 % (FLUSH) 0.9 %
10 SYRINGE (ML) INJECTION AS NEEDED
Status: DISCONTINUED | OUTPATIENT
Start: 2023-01-12 | End: 2023-01-12 | Stop reason: HOSPADM

## 2023-01-12 RX ADMIN — SODIUM CHLORIDE, POTASSIUM CHLORIDE, SODIUM LACTATE AND CALCIUM CHLORIDE 500 ML: 600; 310; 30; 20 INJECTION, SOLUTION INTRAVENOUS at 08:55

## 2023-01-12 NOTE — ED PROVIDER NOTES
"Subjective   History of Present Illness  Paulo Hampton is a 89-year-old white female who present secondary to left leg pain.  Patient states she got up from bed to go to the restroom.  She was trying to get to her walker when she \"just fell\".  She has pain in posterior aspect of her left leg with weightbearing or ambulation.  No other injury.  Patient has been ambulatory since the fall.  EMS was called to the scene.  Patient stable in transport.  She presents for evaluation.    History provided by:  Patient      Review of Systems   Constitutional: Negative for fever.   HENT: Negative.  Negative for rhinorrhea.    Eyes: Negative.  Negative for redness.   Respiratory: Negative for cough.    Cardiovascular: Negative for chest pain.   Gastrointestinal: Negative for abdominal pain.   Endocrine: Negative.    Genitourinary: Negative.  Negative for difficulty urinating.   Musculoskeletal: Positive for gait problem. Negative for back pain.   Skin: Negative.  Negative for color change.   Neurological: Positive for weakness (Legs have been giving out per granddaughter). Negative for syncope.   Hematological: Negative.    Psychiatric/Behavioral: Negative.    All other systems reviewed and are negative.      Past Medical History:   Diagnosis Date   • Abdominal pain    • Anxiety    • Arthritis    • Atrial fibrillation (HCC)    • Bowel obstruction (HCC)    • C. difficile colitis    • Diarrhea    • GERD (gastroesophageal reflux disease)    • Hyperlipidemia    • Hypertension    • Hypothyroid    • Presbyesophagus    • Sinus congestion    • Thrush        Allergies   Allergen Reactions   • Aspirin Unknown - Low Severity   • Aspirin Buf(Cacarb-Mgcarb-Mgo) Unknown - Low Severity   • Biaxin [Clarithromycin] Nausea And Vomiting   • Ceftin [Cefuroxime]      Uncertain if true allergy   • Celexa [Citalopram] Dizziness   • Chlorphen-Pse-Atrop-Hyos-Scop Unknown - Low Severity   • Chlorpheniramine Other (See Comments)     tachycardia   • " Ciprofloxacin      Uncertain if true allergy   • Colesevelam Hcl Unknown - Low Severity     Uncertain if true allergy   • Crestor [Rosuvastatin] Other (See Comments)     Elevated LFTs   • Doxycycline Nausea And Vomiting   • Escitalopram Oxalate Nausea Only   • Fluoxetine Other (See Comments)     Abdominal pain   • Hydrocodone Bitartrate Er      insomnia   • Imipramine Hcl      Uncertain if true allergy   • Levaquin [Levofloxacin] Diarrhea   • Macrobid  [Nitrofurantoin Macrocrystal] Unknown - Low Severity   • Macrobid [Nitrofurantoin Monohyd Macro]      Uncertain if true allergy   • Methscopolamine      Uncertain if true allergy   • Phenylpropanolamine      Uncertain if true allergy   • Prednisone      Uncertain if true allergy   • Promethazine      Uncertain if true allergy   • Sertraline Hcl Nausea Only   • Simvastatin      Uncertain if true allergy     • Venlafaxine Nausea Only   • Verapamil      Uncertain if true allergy   • Zetia [Ezetimibe]      Uncertain if true allergy     • Penicillins Rash   • Sulfa Antibiotics Rash       Past Surgical History:   Procedure Laterality Date   • ABDOMINAL SURGERY     • APPENDECTOMY     • CHOLECYSTECTOMY     • ENDOSCOPY N/A 7/5/2017    Procedure: ESOPHAGOGASTRODUODENOSCOPY WITH DILATION;  Surgeon: Betito Montejo MD;  Location: [facility];  Service:    • HIP HEMIARTHROPLASTY Left 3/2/2020    Procedure: HIP HEMIARTHROPLASTY and all associated procedures;  Surgeon: Jeff Dickinson MD;  Location: [facility] OR;  Service: Orthopedics;  Laterality: Left;   • HYSTERECTOMY     • KNEE ARTHROSCOPY Right    • THYROIDECTOMY         Family History   Problem Relation Age of Onset   • Heart disease Father    • Cancer Sister    • Cancer Brother        Social History     Socioeconomic History   • Marital status: Unknown   Tobacco Use   • Smoking status: Never   • Smokeless tobacco: Never   Substance and Sexual Activity   • Alcohol use: No   • Drug use: No   • Sexual activity: Defer            Objective   Physical Exam  Vitals and nursing note reviewed.   Constitutional:       General: She is not in acute distress.     Appearance: Normal appearance. She is well-developed. She is not ill-appearing, toxic-appearing or diaphoretic.      Comments: 89-year-old white female lying in bed.  Patient appears in fair overall health.  Ms. Hampton is hard of hearing.  However she does answer most questions appropriately.  Vital signs are unremarkable.  Patient is friendly and cooperative.   HENT:      Head: Normocephalic and atraumatic.      Right Ear: Tympanic membrane, ear canal and external ear normal.      Left Ear: Tympanic membrane, ear canal and external ear normal.      Nose: Nose normal.      Mouth/Throat:      Mouth: Mucous membranes are moist.      Pharynx: Oropharynx is clear.   Eyes:      General: Lids are normal.      Extraocular Movements: Extraocular movements intact.      Conjunctiva/sclera: Conjunctivae normal.      Pupils: Pupils are equal, round, and reactive to light.     Cardiovascular:      Rate and Rhythm: Normal rate and regular rhythm.      Pulses: Normal pulses.      Heart sounds: Normal heart sounds. No murmur heard.    No friction rub. No gallop.   Pulmonary:      Effort: Pulmonary effort is normal.      Breath sounds: Normal breath sounds.   Abdominal:      General: Bowel sounds are normal. There is no distension.      Palpations: Abdomen is soft. There is no mass.      Tenderness: There is no abdominal tenderness. There is no guarding or rebound.      Hernia: No hernia is present.   Musculoskeletal:         General: Normal range of motion.      Cervical back: Normal range of motion and neck supple.   Skin:     General: Skin is warm and dry.   Neurological:      General: No focal deficit present.      Mental Status: She is alert.      Deep Tendon Reflexes: Reflexes are normal and symmetric.   Psychiatric:         Mood and Affect: Mood normal.         Behavior: Behavior normal.  "        Procedures           ED Course  ED Course as of 01/14/23 2237   Thu Jan 12, 2023   0558 Obtaining pelvis film and left femur.   [SS]   0717 X-rays show a fracture of the proximal fibula.  Patient is point tender in this region.  She also has tenderness in the mid fibula.  Abdominal additional x-rays.    Granddaughter is now bedside.  She reports patient has been confused for the past 2 weeks.  Patient also had her legs \"give out\" several times in the past 2 weeks.  Granddaughter is concerned that this weakness is what caused her fall this morning.  She is being treated for a UTI with Zithromax.  Patient has numerous allergies including multiple antibiotics.  I have expanding work-up.  Will obtain routine labs as well as a urinalysis. [SS]   0741 Care transferred to Dr. De La Torre. [SS]   0834 Dedicated tib-fib film does not show an acute fracture contrary to prior femur film.  Went in and discussed radiological and laboratory findings with patient and family. []   0903 BUN is 27 and creatinine is 2.1 which is elevated compared to the last reading.  We will give patient IV fluids for improvement otherwise labs and radiological findings all look within normal limits except for mildly decreased hemoglobin of 11.9. [BH]      ED Course User Index  [] Dillon De La Torre MD  [SS] Rudy Vargas MD      Labs Reviewed   COMPREHENSIVE METABOLIC PANEL - Abnormal; Notable for the following components:       Result Value    BUN 27 (*)     Creatinine 2.11 (*)     eGFR 21.6 (*)     All other components within normal limits    Narrative:     GFR Normal >60  Chronic Kidney Disease <60  Kidney Failure <15    The GFR formula is only valid for adults with stable renal function between ages 18 and 70.   CBC WITH AUTO DIFFERENTIAL - Abnormal; Notable for the following components:    Hemoglobin 11.9 (*)     Eosinophils, Absolute 0.43 (*)     All other components within normal limits   URINALYSIS W/ MICROSCOPIC IF INDICATED " (NO CULTURE) - Normal    Narrative:     Urine microscopic not indicated.   CBC AND DIFFERENTIAL    Narrative:     The following orders were created for panel order CBC & Differential.  Procedure                               Abnormality         Status                     ---------                               -----------         ------                     CBC Auto Differential[[id number]]        Abnormal            Final result                 Please view results for these tests on the individual orders.     XR Femur 2 View Left    Result Date: 1/12/2023  Narrative: CR Femur 2 Vws LT, CR Pelvis 1 or 2 Vws INDICATION: Pain left femur inferior hamstring left hip pain fell at nursing home this morning. COMPARISON: Previous hip and pelvis films March 2020. FINDINGS: AP and lateral views of the left femur. Four femur films . Frontal view of the pelvis.  There is a left bipolar hip arthroplasty with anatomic alignment. Femoral head component is located and no fracture is suspected. There is a nondisplaced comminuted fracture of the left proximal fibula. Please correlate for clinical tenderness. Lower sacrum is partially obscured by overlying bowel gas. Allowing for this no gross pelvic fracture is appreciated.     Impression: 1. Post left hip bipolar prosthesis placement with expected postoperative appearance. No acute fracture or dislocation left femur. 2. Essentially nondisplaced mildly comminuted fracture of the proximal fibula. Please correlate with physical findings. 3. No gross pelvic fracture. Lower sacrum is obscured. Signer Name: Erinn Nolasco MD  Signed: 1/12/2023 6:41 AM  Workstation Name: [email or web address]  [facility]    XR Tibia Fibula 2 View Left    Result Date: 1/12/2023  Narrative: LEFT TIBIA-FIBULA, 1/12/2023     HISTORY: 89-year-old female in the ED with leg injury. Fell at nursing care facility today. Proximal fibula fracture questioned on femur series.  TECHNIQUE: AP lateral radiographs  of the tibia and fibula.  FINDINGS: No fracture of the fibula or tibia is demonstrated.  Soft tissue edema is noted in the lower leg. Demineralization may reflect osteoporosis. Mild degenerative arthropathy at the knee.      Impression: 1.  Negative left tibia-fibula series. No fracture of the proximal fibula is demonstrated. 2.  Demineralization. Degenerative knee arthropathy. 3.  Lower leg soft tissue edema.  This report was finalized on 1/12/2023 8:13 AM by Dr. Rajiv Avalos MD.      XR Pelvis 1 or 2 View    Result Date: 1/12/2023  Narrative: CR Femur 2 Vws LT, CR Pelvis 1 or 2 Vws INDICATION: Pain left femur inferior hamstring left hip pain fell at nursing home this morning. COMPARISON: Previous hip and pelvis films March 2020. FINDINGS: AP and lateral views of the left femur. Four femur films . Frontal view of the pelvis.  There is a left bipolar hip arthroplasty with anatomic alignment. Femoral head component is located and no fracture is suspected. There is a nondisplaced comminuted fracture of the left proximal fibula. Please correlate for clinical tenderness. Lower sacrum is partially obscured by overlying bowel gas. Allowing for this no gross pelvic fracture is appreciated.     Impression: 1. Post left hip bipolar prosthesis placement with expected postoperative appearance. No acute fracture or dislocation left femur. 2. Essentially nondisplaced mildly comminuted fracture of the proximal fibula. Please correlate with physical findings. 3. No gross pelvic fracture. Lower sacrum is obscured. Signer Name: Erinn Nolasco MD  Signed: 1/12/2023 6:41 AM  Workstation [facility]: [email or web address]  [facility]         My diagnosis for lower extremity pain and injury includes but is not limited to hip fracture, femur fracture, hip dislocation, hip contusion, hip sprain, hip strain, pelvic fracture, ischio-tibial band pain, ischio-tibial band bursitis, knee sprain, patella dislocation, knee dislocation,  internal derangement of knee, fractures of the femur, tibia, fibula, ankle, foot and digits, ankle sprain, ankle dislocation, Lisfranc fracture, fracture dislocations of the digits, pulmonary embolism, claudication, peripheral vascular disease, gout, osteoarthritis, rheumatoid arthritis, bursitis, septic joint, poly-rheumatica, polyarthralgia and other inflammatory or infectious disease processes.    My differential diagnosis includes but is not limited to generalized weakness, electrolyte abnormality, CVA, TIA, Bell's palsy, acute MI, GI bleed, urinary tract infection, systemic infections including sepsis, alcohol abuse, drug abuse including prescription and street drug.                                  MDM    Final diagnoses:   Dehydration   Pain of left lower extremity       ED Disposition  ED Disposition     ED Disposition   Discharge    Condition   Stable    Comment   --             Laci Freedman MD  [address] [place] [id number] [phone]    Schedule an appointment as soon as possible for a visit   As needed    [facility]  [address] [facility] [address] [phone]  Go to   As needed         Medication List      No changes were made to your prescriptions during this visit.          Rudy Vargas MD  01/14/23 4121

## 2023-01-12 NOTE — ED NOTES
TRIED TO CALL REPORT TO [facility] THAT PATIENT IS D/C AND RETURNING HOME IN CAR WITH GRANDDAUGHTER. NO ANSWER SO LEFT A VOICEMAIL.

## 2023-03-07 ENCOUNTER — HOSPITAL ENCOUNTER (EMERGENCY)
Facility: HOSPITAL | Age: 88
Discharge: HOME | End: 2023-03-07
Attending: EMERGENCY MEDICINE | Admitting: EMERGENCY MEDICINE
Payer: MEDICARE

## 2023-03-07 ENCOUNTER — APPOINTMENT (OUTPATIENT)
Dept: GENERAL RADIOLOGY | Facility: HOSPITAL | Age: 88
End: 2023-03-07
Payer: MEDICARE

## 2023-03-07 VITALS
DIASTOLIC BLOOD PRESSURE: 49 MMHG | TEMPERATURE: 97.8 F | WEIGHT: 146.9 LBS | RESPIRATION RATE: 15 BRPM | HEART RATE: 70 BPM | BODY MASS INDEX: 26.87 KG/M2 | SYSTOLIC BLOOD PRESSURE: 119 MMHG | OXYGEN SATURATION: 98 %

## 2023-03-07 DIAGNOSIS — E86.0 DEHYDRATION: Primary | ICD-10-CM

## 2023-03-07 DIAGNOSIS — M25.551 RIGHT HIP PAIN: ICD-10-CM

## 2023-03-07 LAB
ALBUMIN SERPL-MCNC: 3.6 G/DL (ref 3.5–5.2)
ALBUMIN/GLOB SERPL: 1.3 G/DL
ALP SERPL-CCNC: 82 U/L (ref 39–117)
ALT SERPL W P-5'-P-CCNC: 16 U/L (ref 1–33)
ANION GAP SERPL CALCULATED.3IONS-SCNC: 11.5 MMOL/L (ref 5–15)
AST SERPL-CCNC: 29 U/L (ref 1–32)
BASOPHILS # BLD AUTO: 0.02 10*3/MM3 (ref 0–0.2)
BASOPHILS NFR BLD AUTO: 0.3 % (ref 0–1.5)
BILIRUB SERPL-MCNC: 0.4 MG/DL (ref 0–1.2)
BUN SERPL-MCNC: 18 MG/DL (ref 8–23)
BUN/CREAT SERPL: 12.1 (ref 7–25)
CALCIUM SPEC-SCNC: 9.3 MG/DL (ref 8.2–9.6)
CHLORIDE SERPL-SCNC: 101 MMOL/L (ref 98–107)
CO2 SERPL-SCNC: 22.5 MMOL/L (ref 22–29)
CREAT SERPL-MCNC: 1.49 MG/DL (ref 0.57–1)
DEPRECATED RDW RBC AUTO: 44.1 FL (ref 37–54)
EGFRCR SERPLBLD CKD-EPI 2021: 32.8 ML/MIN/1.73
EOSINOPHIL # BLD AUTO: 0.38 10*3/MM3 (ref 0–0.4)
EOSINOPHIL NFR BLD AUTO: 5 % (ref 0.3–6.2)
ERYTHROCYTE [DISTWIDTH] IN BLOOD BY AUTOMATED COUNT: 12.6 % (ref 12.3–15.4)
GLOBULIN UR ELPH-MCNC: 2.7 GM/DL
GLUCOSE SERPL-MCNC: 133 MG/DL (ref 65–99)
HCT VFR BLD AUTO: 38.8 % (ref 34–46.6)
HGB BLD-MCNC: 12.4 G/DL (ref 12–15.9)
IMM GRANULOCYTES # BLD AUTO: 0.02 10*3/MM3 (ref 0–0.05)
IMM GRANULOCYTES NFR BLD AUTO: 0.3 % (ref 0–0.5)
LYMPHOCYTES # BLD AUTO: 3.25 10*3/MM3 (ref 0.7–3.1)
LYMPHOCYTES NFR BLD AUTO: 42.8 % (ref 19.6–45.3)
MCH RBC QN AUTO: 30.5 PG (ref 26.6–33)
MCHC RBC AUTO-ENTMCNC: 32 G/DL (ref 31.5–35.7)
MCV RBC AUTO: 95.3 FL (ref 79–97)
MONOCYTES # BLD AUTO: 0.61 10*3/MM3 (ref 0.1–0.9)
MONOCYTES NFR BLD AUTO: 8 % (ref 5–12)
NEUTROPHILS NFR BLD AUTO: 3.31 10*3/MM3 (ref 1.7–7)
NEUTROPHILS NFR BLD AUTO: 43.6 % (ref 42.7–76)
NRBC BLD AUTO-RTO: 0 /100 WBC (ref 0–0.2)
PLATELET # BLD AUTO: 201 10*3/MM3 (ref 140–450)
PMV BLD AUTO: 10.9 FL (ref 6–12)
POTASSIUM SERPL-SCNC: 4.1 MMOL/L (ref 3.5–5.2)
PROT SERPL-MCNC: 6.3 G/DL (ref 6–8.5)
RBC # BLD AUTO: 4.07 10*6/MM3 (ref 3.77–5.28)
SODIUM SERPL-SCNC: 135 MMOL/L (ref 136–145)
WBC NRBC COR # BLD: 7.59 10*3/MM3 (ref 3.4–10.8)

## 2023-03-07 PROCEDURE — 99283 EMERGENCY DEPT VISIT LOW MDM: CPT

## 2023-03-07 PROCEDURE — 36415 COLL VENOUS BLD VENIPUNCTURE: CPT

## 2023-03-07 PROCEDURE — 80053 COMPREHEN METABOLIC PANEL: CPT | Performed by: EMERGENCY MEDICINE

## 2023-03-07 PROCEDURE — 93010 ELECTROCARDIOGRAM REPORT: CPT | Performed by: INTERNAL MEDICINE

## 2023-03-07 PROCEDURE — 73502 X-RAY EXAM HIP UNI 2-3 VIEWS: CPT

## 2023-03-07 PROCEDURE — 85025 COMPLETE CBC W/AUTO DIFF WBC: CPT | Performed by: EMERGENCY MEDICINE

## 2023-03-07 PROCEDURE — 93005 ELECTROCARDIOGRAM TRACING: CPT | Performed by: EMERGENCY MEDICINE

## 2023-03-07 RX ORDER — MEMANTINE HYDROCHLORIDE 5 MG/1
5 TABLET ORAL 2 TIMES DAILY
COMMUNITY

## 2023-03-07 RX ORDER — DOCUSATE SODIUM 100 MG/1
100 CAPSULE, LIQUID FILLED ORAL DAILY
COMMUNITY

## 2023-03-07 RX ORDER — FAMOTIDINE 20 MG/1
20 TABLET, FILM COATED ORAL 2 TIMES DAILY
COMMUNITY

## 2023-03-07 RX ORDER — POTASSIUM CHLORIDE 750 MG/1
10 TABLET, FILM COATED, EXTENDED RELEASE ORAL DAILY
COMMUNITY

## 2023-03-07 RX ADMIN — SODIUM CHLORIDE 1000 ML: 9 INJECTION, SOLUTION INTRAVENOUS at 11:43

## 2023-03-07 NOTE — ED NOTES
PT STOOD AND PIVOTED TO BEDSIDE COMMODE MULTIPLE TIMES WITH ASSIST OF ONE, STILL WAITING ON EMS TO TRANSPORT BACK TO NH

## 2023-03-07 NOTE — ED PROVIDER NOTES
Subjective     Fall  Mechanism of injury comment:  From nh, using walker, fell to ground, lightheaded, c/o rt yhip pain, constant, mild, dull  Prior to arrival data:     Loss of consciousness: no      Amnesic to event: no    Associated symptoms: no abdominal pain, no back pain, no chest pain, no difficulty breathing, no headaches, no loss of consciousness and no vomiting        Review of Systems   Cardiovascular: Negative for chest pain.   Gastrointestinal: Negative for abdominal pain and vomiting.   Musculoskeletal: Negative for back pain.   Neurological: Negative for loss of consciousness and headaches.   All other systems reviewed and are negative.      Past Medical History:   Diagnosis Date   • Abdominal pain    • Anxiety    • Arthritis    • Atrial fibrillation (HCC)    • Bowel obstruction (HCC)    • C. difficile colitis    • Diarrhea    • GERD (gastroesophageal reflux disease)    • Hyperlipidemia    • Hypertension    • Hypothyroid    • Presbyesophagus    • Sinus congestion    • Thrush        Allergies   Allergen Reactions   • Aspirin Unknown - Low Severity   • Aspirin Buf(Cacarb-Mgcarb-Mgo) Unknown - Low Severity   • Biaxin [Clarithromycin] Nausea And Vomiting   • Ceftin [Cefuroxime]      Uncertain if true allergy   • Celexa [Citalopram] Dizziness   • Chlorphen-Pse-Atrop-Hyos-Scop Unknown - Low Severity   • Chlorpheniramine Other (See Comments)     tachycardia   • Ciprofloxacin      Uncertain if true allergy   • Colesevelam Hcl Unknown - Low Severity     Uncertain if true allergy   • Crestor [Rosuvastatin] Other (See Comments)     Elevated LFTs   • Doxycycline Nausea And Vomiting   • Escitalopram Oxalate Nausea Only   • Fluoxetine Other (See Comments)     Abdominal pain   • Hydrocodone Bitartrate Er      insomnia   • Imipramine Hcl      Uncertain if true allergy   • Levaquin [Levofloxacin] Diarrhea   • Macrobid  [Nitrofurantoin Macrocrystal] Unknown - Low Severity   • Macrobid [Nitrofurantoin Monohyd Macro]       Uncertain if true allergy   • Methscopolamine      Uncertain if true allergy   • Phenylpropanolamine      Uncertain if true allergy   • Prednisone      Uncertain if true allergy   • Promethazine      Uncertain if true allergy   • Sertraline Hcl Nausea Only   • Simvastatin      Uncertain if true allergy     • Venlafaxine Nausea Only   • Verapamil      Uncertain if true allergy   • Zetia [Ezetimibe]      Uncertain if true allergy     • Penicillins Rash   • Sulfa Antibiotics Rash       Past Surgical History:   Procedure Laterality Date   • ABDOMINAL SURGERY     • APPENDECTOMY     • CHOLECYSTECTOMY     • ENDOSCOPY N/A 7/5/2017    Procedure: ESOPHAGOGASTRODUODENOSCOPY WITH DILATION;  Surgeon: Betito Montejo MD;  Location: [facility] OR;  Service:    • HIP HEMIARTHROPLASTY Left 3/2/2020    Procedure: HIP HEMIARTHROPLASTY and all associated procedures;  Surgeon: Jeff Dickinson MD;  Location: [facility] OR;  Service: Orthopedics;  Laterality: Left;   • HYSTERECTOMY     • KNEE ARTHROSCOPY Right    • THYROIDECTOMY         Family History   Problem Relation Age of Onset   • Heart disease Father    • Cancer Sister    • Cancer Brother        Social History     Socioeconomic History   • Marital status: Unknown   Tobacco Use   • Smoking status: Never   • Smokeless tobacco: Never   Substance and Sexual Activity   • Alcohol use: No   • Drug use: No   • Sexual activity: Defer           Objective   Physical Exam  Vitals and nursing note reviewed.   Constitutional:       Appearance: Normal appearance. She is not ill-appearing or diaphoretic.   HENT:      Head: Normocephalic and atraumatic.      Nose: Nose normal. No rhinorrhea.      Mouth/Throat:      Pharynx: No oropharyngeal exudate or posterior oropharyngeal erythema.   Eyes:      Extraocular Movements: Extraocular movements intact.      Conjunctiva/sclera: Conjunctivae normal.      Pupils: Pupils are equal, round, and reactive to light.   Cardiovascular:      Rate and  Rhythm: Normal rate and regular rhythm.      Pulses: Normal pulses.      Heart sounds: Normal heart sounds.   Pulmonary:      Effort: Pulmonary effort is normal.      Breath sounds: Normal breath sounds.   Abdominal:      General: Abdomen is flat.      Palpations: Abdomen is soft.   Musculoskeletal:         General: Tenderness present. No deformity or signs of injury. Normal range of motion.      Cervical back: Normal range of motion and neck supple.      Right lower leg: No edema.      Left lower leg: No edema.      Comments: ttp rt ant hip   Skin:     General: Skin is warm and dry.      Findings: No bruising or erythema.   Neurological:      General: No focal deficit present.      Mental Status: She is alert and oriented to person, place, and time. Mental status is at baseline.   Psychiatric:         Mood and Affect: Mood normal.         Behavior: Behavior normal.         Procedures           ED Course  ED Course as of 03/07/23 1323   Tue Mar 07, 2023   1127 Ekg by me nsr, artifact, non spec qrs, no st elev [BC]   1322 Reeval, vss, appears well, discussed with family at bs, ok with plan to f/u pcp [BC]      ED Course User Index  [BC] Dank Red MD                                           [facility]    Final diagnoses:   Dehydration   Right hip pain       ED Disposition  ED Disposition     ED Disposition   Discharge    Condition   Stable    Comment   --             Laci Freedman MD  [address] [facility] [address] [phone]    Schedule an appointment as soon as possible for a visit       [facility] Emergency [facility]  [address] [facility] [address] [phone]    As needed, If symptoms worsen         Medication List      No changes were made to your prescriptions during this visit.          Dank Red MD  03/07/23 132

## 2023-03-08 LAB — QT INTERVAL: 462 MS

## 2023-09-25 ENCOUNTER — HOSPITAL ENCOUNTER (EMERGENCY)
Facility: HOSPITAL | Age: 88
Discharge: HOME | End: 2023-09-25
Attending: EMERGENCY MEDICINE | Admitting: EMERGENCY MEDICINE
Payer: MEDICARE

## 2023-09-25 ENCOUNTER — APPOINTMENT (OUTPATIENT)
Dept: CT IMAGING | Facility: HOSPITAL | Age: 88
End: 2023-09-25
Payer: MEDICARE

## 2023-09-25 VITALS
BODY MASS INDEX: 22.87 KG/M2 | SYSTOLIC BLOOD PRESSURE: 181 MMHG | HEIGHT: 62 IN | WEIGHT: 124.3 LBS | RESPIRATION RATE: 18 BRPM | TEMPERATURE: 98.2 F | DIASTOLIC BLOOD PRESSURE: 81 MMHG | HEART RATE: 73 BPM | OXYGEN SATURATION: 96 %

## 2023-09-25 DIAGNOSIS — R10.9 RIGHT SIDED ABDOMINAL PAIN: Primary | ICD-10-CM

## 2023-09-25 LAB
ALBUMIN SERPL-MCNC: 3.7 G/DL (ref 3.5–5.2)
ALBUMIN/GLOB SERPL: 1.2 G/DL
ALP SERPL-CCNC: 92 U/L (ref 39–117)
ALT SERPL W P-5'-P-CCNC: 21 U/L (ref 1–33)
ANION GAP SERPL CALCULATED.3IONS-SCNC: 12.3 MMOL/L (ref 5–15)
AST SERPL-CCNC: 33 U/L (ref 1–32)
BASOPHILS # BLD AUTO: 0.03 10*3/MM3 (ref 0–0.2)
BASOPHILS NFR BLD AUTO: 0.4 % (ref 0–1.5)
BILIRUB SERPL-MCNC: 0.4 MG/DL (ref 0–1.2)
BILIRUB UR QL STRIP: NEGATIVE
BUN SERPL-MCNC: 18 MG/DL (ref 8–23)
BUN/CREAT SERPL: 17.5 (ref 7–25)
CALCIUM SPEC-SCNC: 9.3 MG/DL (ref 8.2–9.6)
CHLORIDE SERPL-SCNC: 104 MMOL/L (ref 98–107)
CLARITY UR: CLEAR
CO2 SERPL-SCNC: 23.7 MMOL/L (ref 22–29)
COLOR UR: YELLOW
CREAT SERPL-MCNC: 1.03 MG/DL (ref 0.57–1)
DEPRECATED RDW RBC AUTO: 43.6 FL (ref 37–54)
EGFRCR SERPLBLD CKD-EPI 2021: 50.8 ML/MIN/1.73
EOSINOPHIL # BLD AUTO: 0.22 10*3/MM3 (ref 0–0.4)
EOSINOPHIL NFR BLD AUTO: 3.1 % (ref 0.3–6.2)
ERYTHROCYTE [DISTWIDTH] IN BLOOD BY AUTOMATED COUNT: 12.3 % (ref 12.3–15.4)
GLOBULIN UR ELPH-MCNC: 3.1 GM/DL
GLUCOSE SERPL-MCNC: 89 MG/DL (ref 65–99)
GLUCOSE UR STRIP-MCNC: NEGATIVE MG/DL
HCT VFR BLD AUTO: 40.4 % (ref 34–46.6)
HGB BLD-MCNC: 12.9 G/DL (ref 12–15.9)
HGB UR QL STRIP.AUTO: NEGATIVE
IMM GRANULOCYTES # BLD AUTO: 0.01 10*3/MM3 (ref 0–0.05)
IMM GRANULOCYTES NFR BLD AUTO: 0.1 % (ref 0–0.5)
KETONES UR QL STRIP: NEGATIVE
LEUKOCYTE ESTERASE UR QL STRIP.AUTO: NEGATIVE
LYMPHOCYTES # BLD AUTO: 3.75 10*3/MM3 (ref 0.7–3.1)
LYMPHOCYTES NFR BLD AUTO: 53.3 % (ref 19.6–45.3)
MCH RBC QN AUTO: 30.8 PG (ref 26.6–33)
MCHC RBC AUTO-ENTMCNC: 31.9 G/DL (ref 31.5–35.7)
MCV RBC AUTO: 96.4 FL (ref 79–97)
MONOCYTES # BLD AUTO: 0.57 10*3/MM3 (ref 0.1–0.9)
MONOCYTES NFR BLD AUTO: 8.1 % (ref 5–12)
NEUTROPHILS NFR BLD AUTO: 2.46 10*3/MM3 (ref 1.7–7)
NEUTROPHILS NFR BLD AUTO: 35 % (ref 42.7–76)
NITRITE UR QL STRIP: NEGATIVE
NRBC BLD AUTO-RTO: 0 /100 WBC (ref 0–0.2)
PH UR STRIP.AUTO: 7 [PH] (ref 4.5–8)
PLATELET # BLD AUTO: 192 10*3/MM3 (ref 140–450)
PMV BLD AUTO: 11.2 FL (ref 6–12)
POTASSIUM SERPL-SCNC: 4.3 MMOL/L (ref 3.5–5.2)
PROT SERPL-MCNC: 6.8 G/DL (ref 6–8.5)
PROT UR QL STRIP: NEGATIVE
RBC # BLD AUTO: 4.19 10*6/MM3 (ref 3.77–5.28)
SODIUM SERPL-SCNC: 140 MMOL/L (ref 136–145)
SP GR UR STRIP: 1.01 (ref 1–1.03)
UROBILINOGEN UR QL STRIP: NORMAL
WBC NRBC COR # BLD: 7.04 10*3/MM3 (ref 3.4–10.8)

## 2023-09-25 PROCEDURE — 80053 COMPREHEN METABOLIC PANEL: CPT

## 2023-09-25 PROCEDURE — 74176 CT ABD & PELVIS W/O CONTRAST: CPT

## 2023-09-25 PROCEDURE — 99284 EMERGENCY DEPT VISIT MOD MDM: CPT

## 2023-09-25 PROCEDURE — 85025 COMPLETE CBC W/AUTO DIFF WBC: CPT

## 2023-09-25 PROCEDURE — 81003 URINALYSIS AUTO W/O SCOPE: CPT | Performed by: EMERGENCY MEDICINE

## 2023-09-25 RX ORDER — SODIUM CHLORIDE 0.9 % (FLUSH) 0.9 %
10 SYRINGE (ML) INJECTION AS NEEDED
Status: DISCONTINUED | OUTPATIENT
Start: 2023-09-25 | End: 2023-09-25 | Stop reason: HOSPADM

## 2023-09-25 NOTE — DISCHARGE INSTRUCTIONS
Continue medications as directed.  Follow-up with your PCP as above.  Return to ED for worsening symptoms, medical emergencies.

## 2023-09-25 NOTE — ED PROVIDER NOTES
EMERGENCY DEPARTMENT ENCOUNTER      Room Number: 07/07    History is provided by the patient, no translation services needed    HPI:    Chief complaint: Abdominal pain    Location: Right-sided    Quality/Severity: Patient describes the pain as a mild sharp pain.    Timing/Duration: Since yesterday    Modifying Factors: None    Associated Symptoms: Increased frequency of urination    Narrative: Pt is a 89 y.o. female who presents complaining of right-sided abdominal pain since yesterday.  The patient describes the pain as a mild sharp pain.  She states that the pain has greatly improved today and is essentially resolved.  She denies any associated nausea, vomiting, diarrhea, blood in her stool, dysuria, or hematuria.  She has had an increased frequency of urination.  She denies any fevers or chills.      PMD: Laci Freedman MD    REVIEW OF SYSTEMS  Review of Systems   Constitutional:  Negative for chills and fever.   Eyes:  Negative for photophobia and visual disturbance.   Respiratory:  Negative for cough and shortness of breath.    Cardiovascular:  Negative for chest pain.   Gastrointestinal:  Positive for abdominal pain (Right-sided). Negative for blood in stool, constipation, diarrhea, nausea and vomiting.   Genitourinary:  Positive for frequency. Negative for decreased urine volume, difficulty urinating, dysuria, flank pain, hematuria and urgency.   Musculoskeletal:  Negative for back pain, gait problem and neck pain.   Skin:  Negative for color change, pallor, rash and wound.   Neurological:  Negative for dizziness, syncope, weakness, numbness and headaches.   Psychiatric/Behavioral:  Negative for confusion. The patient is not nervous/anxious.        PAST MEDICAL HISTORY  Active Ambulatory Problems     Diagnosis Date Noted    Small bowel obstruction 01/17/2020    Diverticulitis 01/17/2020    HTN (hypertension), benign 01/17/2020    GERD without esophagitis 01/17/2020    Hypothyroidism 01/17/2020     Hyperlipidemia 01/17/2020    Anxiety disorder 01/17/2020    Closed fracture of left hip 02/29/2020    Hemorrhoid 03/01/2020    Pathological fracture of left hip due to osteoporosis 03/01/2020     Resolved Ambulatory Problems     Diagnosis Date Noted    Acute lower GI bleeding 04/05/2017    Lower GI bleed 04/06/2017     Past Medical History:   Diagnosis Date    Abdominal pain     Anxiety     Arthritis     Atrial fibrillation     Bowel obstruction     C. difficile colitis     Diarrhea     GERD (gastroesophageal reflux disease)     Hypertension     Hypothyroid     Presbyesophagus     Sinus congestion     Thrush        PAST SURGICAL HISTORY  Past Surgical History:   Procedure Laterality Date    ABDOMINAL SURGERY      APPENDECTOMY      CHOLECYSTECTOMY      ENDOSCOPY N/A 7/5/2017    Procedure: ESOPHAGOGASTRODUODENOSCOPY WITH DILATION;  Surgeon: Betito Montejo MD;  Location: [facility];  Service:     HIP HEMIARTHROPLASTY Left 3/2/2020    Procedure: HIP HEMIARTHROPLASTY and all associated procedures;  Surgeon: Jeff Dickinson MD;  Location:  [facility] OR;  Service: Orthopedics;  Laterality: Left;    HYSTERECTOMY      KNEE ARTHROSCOPY Right     THYROIDECTOMY         FAMILY HISTORY  Family History   Problem Relation Age of Onset    Heart disease Father     Cancer Sister     Cancer Brother        SOCIAL HISTORY  Social History     Socioeconomic History    Marital status: Unknown   Tobacco Use    Smoking status: Never    Smokeless tobacco: Never   Substance and Sexual Activity    Alcohol use: No    Drug use: No    Sexual activity: Defer       ALLERGIES  Aspirin, Aspirin buf(cacarb-mgcarb-mgo), Biaxin [clarithromycin], Ceftin [cefuroxime], Celexa [citalopram], Chlorphen-pse-atrop-hyos-scop, Chlorpheniramine, Ciprofloxacin, Colesevelam hcl, Crestor [rosuvastatin], Doxycycline, Escitalopram oxalate, Fluoxetine, Hydrocodone bitartrate er, Imipramine hcl, Levaquin [levofloxacin], Macrobid  [nitrofurantoin macrocrystal],  Macrobid [nitrofurantoin monohyd macro], Methscopolamine, Phenylpropanolamine, Prednisone, Promethazine, Sertraline hcl, Simvastatin, Venlafaxine, Verapamil, Zetia [ezetimibe], Penicillins, and Sulfa antibiotics      Current Facility-Administered Medications:     [COMPLETED] Insert Peripheral IV, , , Once **AND** sodium chloride 0.9 % flush 10 mL, 10 mL, Intravenous, PRN, Lanny Bah PA-C    Current Outpatient Medications:     acetaminophen (TYLENOL) 500 MG tablet, Take 2 tablets by mouth Daily As Needed for Mild Pain., Disp: , Rfl:     aspirin 81 MG chewable tablet, Chew 1 tablet Daily., Disp: , Rfl:     BRINZOLAMIDE - BRIMONIDONE 1-0.2% OPTHALMIC SUSPENSION, Administer 0.2 % to the right eye 2 (Two) Times a Day., Disp: , Rfl:     Calcium Carbonate-Vitamin D (CALCIUM 500 + D PO), Take 1 tablet by mouth 2 (Two) Times a Day., Disp: , Rfl:     clindamycin (CLEOCIN) 300 MG capsule, Take 2 pills 1 hour prior to dental procedure, Disp: 2 capsule, Rfl: 3    clotrimazole-betamethasone (LOTRISONE) 1-0.05 % cream, Apply 1 application  topically to the appropriate area as directed 2 (Two) Times a Day As Needed (HEMMROIDS)., Disp: , Rfl:     dicyclomine (BENTYL) 10 MG capsule, Take 1 capsule by mouth 4 (Four) Times a Day Before Meals & at Bedtime As Needed (ABDOMINAL PAIN)., Disp: , Rfl:     diltiaZEM (CARDIZEM) 30 MG tablet, Take 1 tablet by mouth 2 (Two) Times a Day., Disp: , Rfl:     docusate sodium (COLACE) 100 MG capsule, Take 1 capsule by mouth Daily., Disp: , Rfl:     dorzolamide (TRUSOPT) 2 % ophthalmic solution, Administer 1 drop to the right eye 2 (Two) Times a Day., Disp: , Rfl:     famotidine (PEPCID) 20 MG tablet, Take 1 tablet by mouth 2 (Two) Times a Day., Disp: , Rfl:     fluticasone (FLONASE) 50 MCG/ACT nasal spray, 2 sprays into the nostril(s) as directed by provider Daily., Disp: , Rfl:     guaiFENesin (MUCINEX) 600 MG 12 hr tablet, Take 1 tablet by mouth Every 12 (Twelve) Hours As Needed for Cough.,  Disp: , Rfl:     hydrocortisone-pramoxine (PROCTOFOAM-HS) 1-1 % rectal foam, Insert 1 applicator into the rectum 2 (Two) Times a Day., Disp: 10 g, Rfl: 0    latanoprost (XALATAN) 0.005 % ophthalmic solution, Administer 1 drop to the right eye Every Night., Disp: , Rfl:     levocetirizine (XYZAL) 5 MG tablet, Take 1 tablet by mouth Every Evening., Disp: , Rfl:     levothyroxine (SYNTHROID, LEVOTHROID) 50 MCG tablet, Take 1 tablet by mouth Daily., Disp: , Rfl:     lisinopril (PRINIVIL,ZESTRIL) 20 MG tablet, Take 1 tablet by mouth Daily. Take one half tablet daily for 1 week then advanced to one full tablet daily, Disp: 30 tablet, Rfl: 0    LORazepam (ATIVAN) 1 MG tablet, Take 1 tablet by mouth 3 (Three) Times a Day. Takes 1 in AM; 2 at HS.  Per daughter in law pt PMD told pt she can take an extra at 5pm, Disp: 12 tablet, Rfl: 0    meclizine (ANTIVERT) 12.5 MG tablet, Take 1 tablet by mouth 3 (Three) Times a Day As Needed for Dizziness., Disp: , Rfl:     memantine (NAMENDA) 5 MG tablet, Take 1 tablet by mouth 2 (Two) Times a Day., Disp: , Rfl:     mirtazapine (REMERON) 15 MG tablet, Take 1 tablet by mouth Every Night., Disp: , Rfl:     Multiple Vitamins-Minerals (CENTRUM SILVER PO), Take 1 tablet by mouth Daily., Disp: , Rfl:     pantoprazole (PROTONIX) 40 MG EC tablet, Take 1 tablet by mouth Daily., Disp: , Rfl:     potassium chloride 10 MEQ CR tablet, Take 1 tablet by mouth Daily., Disp: , Rfl:     Probiotic Product (PROBIOTIC-10 PO), Take  by mouth., Disp: , Rfl:     sennosides-docusate (PERICOLACE) 8.6-50 MG per tablet, Take 2 tablets by mouth 2 (Two) Times a Day., Disp: , Rfl:     simethicone (MYLICON) 125 MG chewable tablet, Chew 1 tablet Every 6 (Six) Hours As Needed for Flatulence., Disp: , Rfl:     timolol (TIMOPTIC) 0.5 % ophthalmic solution, Administer 1 drop to the right eye 2 (Two) Times a Day., Disp: , Rfl:     traMADol (ULTRAM) 50 MG tablet, Take 1 tablet by mouth Every 8 (Eight) Hours As Needed. for  pain, Disp: , Rfl:     PHYSICAL EXAM  ED Triage Vitals [09/25/23 1200]   Temp Heart Rate Resp BP SpO2   98.2 °F (36.8 °C) 68 16 171/70 98 %      Temp src Heart Rate Source Patient Position BP Location FiO2 (%)   Oral Monitor Lying Right arm --       Physical Exam  Vitals and nursing note reviewed.   Constitutional:       General: She is not in acute distress.     Appearance: She is well-developed. She is not ill-appearing, toxic-appearing or diaphoretic.   HENT:      Head: Normocephalic and atraumatic.   Eyes:      General: No scleral icterus.     Extraocular Movements: Extraocular movements intact.      Conjunctiva/sclera: Conjunctivae normal.      Pupils: Pupils are equal, round, and reactive to light.   Cardiovascular:      Rate and Rhythm: Normal rate and regular rhythm.      Heart sounds: Normal heart sounds.     No friction rub.   Pulmonary:      Effort: Pulmonary effort is normal. No respiratory distress.      Breath sounds: Normal breath sounds. No stridor. No wheezing, rhonchi or rales.   Chest:      Chest wall: No tenderness.   Abdominal:      General: Bowel sounds are normal. There is no distension.      Palpations: Abdomen is soft.      Tenderness: There is generalized abdominal tenderness. There is no guarding or rebound. Negative signs include Tanner's sign, Rovsing's sign and McBurney's sign.   Musculoskeletal:         General: Normal range of motion.      Cervical back: Normal range of motion and neck supple.   Skin:     General: Skin is warm and dry.      Coloration: Skin is not cyanotic, jaundiced, mottled or pale.      Findings: No erythema or rash.   Neurological:      Mental Status: She is alert.      Comments: At baseline   Psychiatric:         Mood and Affect: Mood and affect normal.         LAB RESULTS  Lab Results (last 24 hours)       Procedure Component Value Units Date/Time    Urinalysis With Microscopic If Indicated (No Culture) - Straight Cath [[id number]]  (Normal) Collected: 09/25/23  1210    Specimen: Urine from Straight Cath Updated: 09/25/23 1219     Color, UA Yellow     Appearance, UA Clear     pH, UA 7.0     Specific Gravity, UA 1.010     Glucose, UA Negative     Ketones, UA Negative     Bilirubin, UA Negative     Blood, UA Negative     Protein, UA Negative     Leuk Esterase, UA Negative     Nitrite, UA Negative     Urobilinogen, UA 0.2 E.U./dL    Narrative:      Urine microscopic not indicated.    CBC & Differential [[id number]]  (Abnormal) Collected: 09/25/23 1310    Specimen: Blood Updated: 09/25/23 1325    Narrative:      The following orders were created for panel order CBC & Differential.  Procedure                               Abnormality         Status                     ---------                               -----------         ------                     CBC Auto Differential[[id number]]        Abnormal            Final result                 Please view results for these tests on the individual orders.    Comprehensive Metabolic Panel [[id number]]  (Abnormal) Collected: 09/25/23 1310    Specimen: Blood Updated: 09/25/23 1341     Glucose 89 mg/dL      BUN 18 mg/dL      Creatinine 1.03 mg/dL      Sodium 140 mmol/L      Potassium 4.3 mmol/L      Chloride 104 mmol/L      CO2 23.7 mmol/L      Calcium 9.3 mg/dL      Total Protein 6.8 g/dL      Albumin 3.7 g/dL      ALT (SGPT) 21 U/L      AST (SGOT) 33 U/L      Alkaline Phosphatase 92 U/L      Total Bilirubin 0.4 mg/dL      Globulin 3.1 gm/dL      A/G Ratio 1.2 g/dL      BUN/Creatinine Ratio 17.5     Anion Gap 12.3 mmol/L      eGFR 50.8 mL/min/1.73     Narrative:      GFR Normal >60  Chronic Kidney Disease <60  Kidney Failure <15    The GFR formula is only valid for adults with stable renal function between ages 18 and 70.    CBC Auto Differential [[id number]]  (Abnormal) Collected: 09/25/23 1310    Specimen: Blood Updated: 09/25/23 1325     WBC 7.04 10*3/mm3      RBC 4.19 10*6/mm3      Hemoglobin 12.9 g/dL      Hematocrit 40.4 %      MCV  96.4 fL      MCH 30.8 pg      MCHC 31.9 g/dL      RDW 12.3 %      RDW-SD 43.6 fl      MPV 11.2 fL      Platelets 192 10*3/mm3      Neutrophil % 35.0 %      Lymphocyte % 53.3 %      Monocyte % 8.1 %      Eosinophil % 3.1 %      Basophil % 0.4 %      Immature Grans % 0.1 %      Neutrophils, Absolute 2.46 10*3/mm3      Lymphocytes, Absolute 3.75 10*3/mm3      Monocytes, Absolute 0.57 10*3/mm3      Eosinophils, Absolute 0.22 10*3/mm3      Basophils, Absolute 0.03 10*3/mm3      Immature Grans, Absolute 0.01 10*3/mm3      nRBC 0.0 /100 WBC               I ordered the above labs and reviewed the results    RADIOLOGY  CT Abdomen Pelvis Without Contrast    Result Date: 9/25/2023  CT ABDOMEN AND PELVIS, NONCONTRAST, 9/25/2023  HISTORY: 89-year-old female with 3-day history right side abdomen pain.  TECHNIQUE: CT imaging of the abdomen and pelvis ordered without IV contrast. Radiation dose reduction techniques included automated exposure control or exposure modulation based on body size. Radiation audit for CT and nuclear cardiology exams in the last 12 months: 0.  COMPARISON: *  CT abdomen/pelvis, 1/17/2020.  ABDOMEN FINDINGS: Kidneys and urinary tract are unremarkable with no visible nephrolithiasis or evidence of upper urinary tract obstruction. Small benign left lower pole renal cyst. Normal caliber abdominal aorta. Normal adrenal glands.  Cholecystectomy. No significant bile duct dilatation. Tiny benign left lobe liver cyst. Liver, pancreas and spleen are otherwise unremarkable.  Moderately severe sigmoid and lower descending colonic diverticulosis. No evidence of acute diverticulitis. Small bowel and colon are otherwise normal in caliber and appearance, as imaged. The appendix is surgically absent by history. The stomach is nondistended. Moderate sized hiatal hernia.  PELVIS FINDINGS: Hysterectomy. Urinary bladder and rectum are within normal limits. Moderately large volume rectal stool. Left hip arthroplasty.  Limited  lung base images show no active disease.      1.  No acute abnormality within the abdomen or pelvis. 2.  Lower colonic diverticulosis with no evidence of acute diverticulitis. Appendectomy. 3.  Cholecystectomy. 4.  Moderate sized hiatal hernia.   This report was finalized on 9/25/2023 2:13 PM by Dr. Rajiv Avalos MD.       I ordered the above radiologic testing and reviewed the results    PROCEDURES  Procedures      PROGRESS AND CONSULTS  ED Course as of 09/25/23 1420   Mon Sep 25, 2023   1217 [facility] reports that the patient has had right-sided abdominal pain since Saturday.  No nausea, vomiting, or diarrhea.  The patient advises that her pain has been present since yesterday.  She states that the pain has improved today.  She describes the pain as a sharp pain on the right side of her abdomen.  She had mild diffuse tenderness to palpation on exam.  Vital signs are all stable and within normal limits.  Patient also with complains of increased frequency of urination.  Labs and imaging have been ordered to evaluate further. [AH]   1416 Results discussed in depth with the patient and the daughter. Both expressed understanding. Follow up instructions given. Return to the ED instructions given.  [AH]      ED Course User Index  [AH] Lanny Bah PA-C           MEDICAL DECISION MAKING    MDM     Amount and/or Complexity of Data Reviewed  Clinical lab tests: reviewed         My differential diagnosis for abdominal pain includes but is not limited to:  Gastritis, gastroenteritis, peptic ulcer disease, GERD, esophageal perforation, acute appendicitis, mesenteric adenitis, Meckel’s diverticulum, epiploic appendagitis, diverticulitis, colon cancer, ulcerative colitis, Crohn’s disease, intussusception, small bowel obstruction, adhesions, ischemic bowel, perforated viscus, ileus, obstipation, biliary colic, cholecystitis, cholelithiasis, Johnnie-Dougie Jimy, hepatitis, pancreatitis, common bile duct obstruction,  cholangitis, bile leak, splenic trauma, splenic rupture, splenic infarction, splenic abscess, abdominal wall hematoma, abdominal wall abscess, intra-abdominal abscess, ascites, spontaneous bacterial peritonitis, hernia, UTI, cystitis, prostatitis, ureterolithiasis, urinary obstruction, AAA, myocardial infarction, pneumonia, cancer, porphyria, DKA, medications, sickle cell, viral syndrome, zoster   DIAGNOSIS  Final diagnoses:   Right sided abdominal pain       Latest Documented Vital Signs:  As of 14:20 EDT  BP- (!) 191/95 HR- 77 Temp- 98.2 °F (36.8 °C) (Oral) O2 sat- 97%    DISPOSITION  Pt discharged    Discussed pertinent findings with the patient/family.  Patient/Family voiced understanding of need to follow-up for recheck and further testing as needed.  Return to the Emergency Department warnings were given.         Medication List      No changes were made to your prescriptions during this visit.              Follow-up Information       Laci Freedman MD. Call today.    Specialty: Family Medicine  Why: to schedule follow up  Contact information:  [address]  [place] [place] [address] [phone]               Go to  [facility] EMERGENCY DEPARTMENT.    Specialty: Emergency Medicine  Why: If symptoms worsen  Contact information:  [address]  [place] [address] [phone]                             Dictated utilizing Dragon dictation     Lanny Bah PA-C  09/25/23 1429

## 2023-10-12 ENCOUNTER — APPOINTMENT (OUTPATIENT)
Dept: GENERAL RADIOLOGY | Facility: HOSPITAL | Age: 88
End: 2023-10-12
Payer: MEDICARE

## 2023-10-12 ENCOUNTER — HOSPITAL ENCOUNTER (EMERGENCY)
Facility: HOSPITAL | Age: 88
Discharge: HOME | End: 2023-10-12
Attending: EMERGENCY MEDICINE
Payer: MEDICARE

## 2023-10-12 VITALS
SYSTOLIC BLOOD PRESSURE: 158 MMHG | WEIGHT: 155.4 LBS | TEMPERATURE: 97.6 F | DIASTOLIC BLOOD PRESSURE: 81 MMHG | RESPIRATION RATE: 16 BRPM | BODY MASS INDEX: 28.42 KG/M2 | OXYGEN SATURATION: 97 % | HEART RATE: 90 BPM
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
## 2023-10-12 DIAGNOSIS — S22.32XA CLOSED FRACTURE OF ONE RIB OF LEFT SIDE, INITIAL ENCOUNTER: ICD-10-CM

## 2023-10-12 DIAGNOSIS — S22.000A COMPRESSION FRACTURE OF THORACIC VERTEBRA, INITIAL ENCOUNTER: Primary | ICD-10-CM

## 2023-10-12 PROCEDURE — 99283 EMERGENCY DEPT VISIT LOW MDM: CPT

## 2023-10-12 PROCEDURE — 25010000002 MORPHINE PER 10 MG: Performed by: EMERGENCY MEDICINE

## 2023-10-12 PROCEDURE — 63710000001 ONDANSETRON ODT 4 MG TABLET DISPERSIBLE: Performed by: EMERGENCY MEDICINE

## 2023-10-12 PROCEDURE — 73502 X-RAY EXAM HIP UNI 2-3 VIEWS: CPT

## 2023-10-12 PROCEDURE — 96372 THER/PROPH/DIAG INJ SC/IM: CPT

## 2023-10-12 PROCEDURE — 71101 X-RAY EXAM UNILAT RIBS/CHEST: CPT

## 2023-10-12 PROCEDURE — 72100 X-RAY EXAM L-S SPINE 2/3 VWS: CPT

## 2023-10-12 RX ORDER — ONDANSETRON 4 MG/1
4 TABLET, ORALLY DISINTEGRATING ORAL ONCE
Status: COMPLETED | OUTPATIENT
Start: 2023-10-12 | End: 2023-10-12

## 2023-10-12 RX ADMIN — MORPHINE SULFATE 4 MG: 4 INJECTION, SOLUTION INTRAMUSCULAR; INTRAVENOUS at 10:07

## 2023-10-12 RX ADMIN — ONDANSETRON 4 MG: 4 TABLET, ORALLY DISINTEGRATING ORAL at 10:07

## 2023-10-12 NOTE — ED PROVIDER NOTES
Subjective   History of Present Illness  Patient presents via EMS after falling out of bed last night.  Patient comes from [facility] and rehab.  Patient's daughter is providing additional details since pt has dementia.  She said that the staff told her that they were doing nightly rounds giving him meds and patient's bed alarm went off and patient was found sitting on the ground.  They were able to assist her back into bed and at the time she had no major complaints.  Patient then woke up this morning and is complaining of low back primarily on the left side.  Patient is denying any neurologic defects including no weakness or sensory loss but does have pain with left hip movement and changing positions in the bed.  Patient's primarily wanting to lay on her right side to reduce the pain.  No therapy taken prior to arrival.  Laying still does not make it better.  Any type of movement sitting up or changing position makes it worse.  Patient is otherwise been at her baseline health recently.      Review of Systems   All other systems reviewed and are negative.      Past Medical History:   Diagnosis Date    Abdominal pain     Anxiety     Arthritis     Atrial fibrillation     Bowel obstruction     C. difficile colitis     Diarrhea     GERD (gastroesophageal reflux disease)     Hyperlipidemia     Hypertension     Hypothyroid     Presbyesophagus     Sinus congestion     Thrush        Allergies   Allergen Reactions    Aspirin Unknown - Low Severity    Aspirin Buf(Cacarb-Mgcarb-Mgo) Unknown - Low Severity    Biaxin [Clarithromycin] Nausea And Vomiting    Ceftin [Cefuroxime]      Uncertain if true allergy    Celexa [Citalopram] Dizziness    Chlorphen-Pse-Atrop-Hyos-Scop Unknown - Low Severity    Chlorpheniramine Other (See Comments)     tachycardia    Ciprofloxacin      Uncertain if true allergy    Colesevelam Hcl Unknown - Low Severity     Uncertain if true allergy    Crestor [Rosuvastatin] Other (See Comments)      Elevated LFTs    Doxycycline Nausea And Vomiting    Escitalopram Oxalate Nausea Only    Fluoxetine Other (See Comments)     Abdominal pain    Hydrocodone Bitartrate Er      insomnia    Imipramine Hcl      Uncertain if true allergy    Levaquin [Levofloxacin] Diarrhea    Macrobid  [Nitrofurantoin Macrocrystal] Unknown - Low Severity    Macrobid [Nitrofurantoin Monohyd Macro]      Uncertain if true allergy    Methscopolamine      Uncertain if true allergy    Phenylpropanolamine      Uncertain if true allergy    Prednisone      Uncertain if true allergy    Promethazine      Uncertain if true allergy    Sertraline Hcl Nausea Only    Simvastatin      Uncertain if true allergy      Venlafaxine Nausea Only    Verapamil      Uncertain if true allergy    Zetia [Ezetimibe]      Uncertain if true allergy      Penicillins Rash    Sulfa Antibiotics Rash       Past Surgical History:   Procedure Laterality Date    ABDOMINAL SURGERY      APPENDECTOMY      CHOLECYSTECTOMY      ENDOSCOPY N/A 7/5/2017    Procedure: ESOPHAGOGASTRODUODENOSCOPY WITH DILATION;  Surgeon: Betito Montejo MD;  Location: [facility] OR;  Service:     HIP HEMIARTHROPLASTY Left 3/2/2020    Procedure: HIP HEMIARTHROPLASTY and all associated procedures;  Surgeon: Jeff Dickinson MD;  Location: [facility] OR;  Service: Orthopedics;  Laterality: Left;    HYSTERECTOMY      KNEE ARTHROSCOPY Right     THYROIDECTOMY         Family History   Problem Relation Age of Onset    Heart disease Father     Cancer Sister     Cancer Brother        Social History     Socioeconomic History    Marital status: Unknown   Tobacco Use    Smoking status: Never    Smokeless tobacco: Never   Substance and Sexual Activity    Alcohol use: No    Drug use: No    Sexual activity: Defer           Objective   Physical Exam  Vitals and nursing note reviewed.   HENT:      Head: Normocephalic.      Right Ear: External ear normal.      Left Ear: External ear normal.      Nose: Nose normal.       Mouth/Throat:      Mouth: Mucous membranes are moist.      Pharynx: Oropharynx is clear.   Eyes:      Conjunctiva/sclera: Conjunctivae normal.      Pupils: Pupils are equal, round, and reactive to light.   Cardiovascular:      Rate and Rhythm: Normal rate and regular rhythm.      Pulses:           Dorsalis pedis pulses are 2+ on the right side and 2+ on the left side.        Posterior tibial pulses are 2+ on the right side and 2+ on the left side.      Heart sounds: Normal heart sounds.   Pulmonary:      Effort: Pulmonary effort is normal.      Breath sounds: Normal breath sounds.   Abdominal:      General: Abdomen is flat.      Palpations: Abdomen is soft.   Musculoskeletal:      Cervical back: Neck supple.      Comments: Palpation of C, T, and L-spine are all nontender without any step-offs.  Patient does have tenderness in her left distal thoracic paraspinous back and superior lumbar paraspinous back.  There is no ecchymosis or abrasion.  Additional compressional palpation of bilateral feet, ankles, lower legs, knees, and femurs are all unremarkable and nontender.  No deformity.  Patient does have some mild tenderness to left superior lateral hip.  No compressional changes with palpation of pelvis.   Skin:     General: Skin is warm and dry.      Capillary Refill: Capillary refill takes 2 to 3 seconds.   Neurological:      General: No focal deficit present.      Mental Status: She is alert.      Comments: Patient moving all 4 extremities purposely   Psychiatric:         Mood and Affect: Mood normal.         Behavior: Behavior normal.         Procedures           ED Course                                           Medical Decision Making  Ddx fracture, dislocation, sprain, strain, contusion    XR Spine Lumbar 2 or 3 View    Result Date: 10/12/2023   1. New acute compression fracture deformity of T12. Mild posterior retropulsion. Correlate with patient point tenderness. 2. Degenerative changes and postoperative  changes..    This report was finalized on 10/12/2023 8:36 AM by Dr. Fercho Ford MD.      XR Hip With or Without Pelvis 2 - 3 View Left    Result Date: 10/12/2023   1. Postop changes total left hip arthroplasty. No acute fracture..  This report was finalized on 10/12/2023 8:32 AM by Dr. Fercho Ford MD.      XR Ribs Left With PA Chest    Result Date: 10/12/2023   1. Subtle buckle fracture deformity of the anterolateral left ninth rib. No additional fracture. No pneumothorax.    This report was finalized on 10/12/2023 8:29 AM by Dr. Fercho Ford MD.      0840 Pt seen again prior to d/c.  Imaging reviewed and are remarkable for small rib fx and T12 C. fx.  Vitals stable and pt. in NAD. Comfortable.  Relaxed breathing.  All questions personally answered at the bedside and all d/c instructions personally reviewed with pt's daughter.  Discussed the importance of close outpt. f/u and pt's daughter understands this and agrees to do so.  Pt's daughter agrees to return to ED immediately for any new, persistent, or worsening symptoms.    EMR Dragon/Transcription disclaimer:  Much of this encounter note is an electronic transcription/translation of spoken language to printed text, aka voice recognition.  The electronic translation of spoken language may permit erroneous or at times nonsensical words or phrases to be inadvertently transcribed; although I have reviewed the note for such errors, some may still exist so please interpret based on surrounding text content.      Amount and/or Complexity of Data Reviewed  Radiology: ordered.        Final diagnoses:   Compression fracture of thoracic vertebra, initial encounter   Closed fracture of one rib of left side, initial encounter   Fall, initial encounter       ED Disposition  ED Disposition       ED Disposition   Discharge    Condition   Stable    Comment   --               Laci Freedman MD  [address]  [place] [address] [phone]    In 1 day  If symptoms  Mariano Juarez MD  [address] [place] [address]  [phone]    In 2 days           Medication List      No changes were made to your prescriptions during this visit.            John Kinney MD  10/12/23 0851

## 2023-10-12 NOTE — ED NOTES
Pt has hx of dementia with baseline AOx1, unable to answer many of the triage questions, no family at bs

## 2023-10-12 NOTE — ED NOTES
Spoke with armand from [employer], she will come approx 11-12 to place [facility], family and pt updated

## 2024-10-01 NOTE — DISCHARGE INSTRUCTIONS
Medication as directed.  Take her blood pressure twice daily and record.  Taking this record to follow-up with Dr. Freedman in 1 week.  Sooner if needed.  Return to ED for worsening symptoms, medical emergencies.    Hypertension  Hypertension, commonly called high blood pressure, is when the force of blood pumping through your arteries is too strong. Your arteries are the blood vessels that carry blood from your heart throughout your body. A blood pressure reading consists of a higher number over a lower number, such as 110/72. The higher number (systolic) is the pressure inside your arteries when your heart pumps. The lower number (diastolic) is the pressure inside your arteries when your heart relaxes. Ideally you want your blood pressure below 120/80.  Hypertension forces your heart to work harder to pump blood. Your arteries may become narrow or stiff. Having untreated or uncontrolled hypertension can cause heart attack, stroke, kidney disease, and other problems.  RISK FACTORS  Some risk factors for high blood pressure are controllable. Others are not.   Risk factors you cannot control include:   · Race. You may be at higher risk if you are .  · Age. Risk increases with age.  · Gender. Men are at higher risk than women before age 45 years. After age 65, women are at higher risk than men.  Risk factors you can control include:  · Not getting enough exercise or physical activity.  · Being overweight.  · Getting too much fat, sugar, calories, or salt in your diet.  · Drinking too much alcohol.  SIGNS AND SYMPTOMS  Hypertension does not usually cause signs or symptoms. Extremely high blood pressure (hypertensive crisis) may cause headache, anxiety, shortness of breath, and nosebleed.  DIAGNOSIS  To check if you have hypertension, your health care provider will measure your blood pressure while you are seated, with your arm held at the level of your heart. It should be measured at least twice using the  HIV antibody reactive same arm. Certain conditions can cause a difference in blood pressure between your right and left arms. A blood pressure reading that is higher than normal on one occasion does not mean that you need treatment. If it is not clear whether you have high blood pressure, you may be asked to return on a different day to have your blood pressure checked again. Or, you may be asked to monitor your blood pressure at home for 1 or more weeks.  TREATMENT  Treating high blood pressure includes making lifestyle changes and possibly taking medicine. Living a healthy lifestyle can help lower high blood pressure. You may need to change some of your habits.  Lifestyle changes may include:  · Following the DASH diet. This diet is high in fruits, vegetables, and whole grains. It is low in salt, red meat, and added sugars.  · Keep your sodium intake below 2,300 mg per day.  · Getting at least 30-45 minutes of aerobic exercise at least 4 times per week.  · Losing weight if necessary.  · Not smoking.  · Limiting alcoholic beverages.  · Learning ways to reduce stress.  Your health care provider may prescribe medicine if lifestyle changes are not enough to get your blood pressure under control, and if one of the following is true:  · You are 18-59 years of age and your systolic blood pressure is above 140.  · You are 60 years of age or older, and your systolic blood pressure is above 150.  · Your diastolic blood pressure is above 90.  · You have diabetes, and your systolic blood pressure is over 140 or your diastolic blood pressure is over 90.  · You have kidney disease and your blood pressure is above 140/90.  · You have heart disease and your blood pressure is above 140/90.  Your personal target blood pressure may vary depending on your medical conditions, your age, and other factors.  HOME CARE INSTRUCTIONS  · Have your blood pressure rechecked as directed by your health care provider.    · Take medicines only as directed by your  health care provider. Follow the directions carefully. Blood pressure medicines must be taken as prescribed. The medicine does not work as well when you skip doses. Skipping doses also puts you at risk for problems.  · Do not smoke.    · Monitor your blood pressure at home as directed by your health care provider.   SEEK MEDICAL CARE IF:   · You think you are having a reaction to medicines taken.  · You have recurrent headaches or feel dizzy.  · You have swelling in your ankles.  · You have trouble with your vision.  SEEK IMMEDIATE MEDICAL CARE IF:  · You develop a severe headache or confusion.  · You have unusual weakness, numbness, or feel faint.  · You have severe chest or abdominal pain.  · You vomit repeatedly.  · You have trouble breathing.  MAKE SURE YOU:   · Understand these instructions.  · Will watch your condition.  · Will get help right away if you are not doing well or get worse.     This information is not intended to replace advice given to you by your health care provider. Make sure you discuss any questions you have with your health care provider.     Document Released: 12/18/2006 Document Revised: 05/03/2016 Document Reviewed: 10/10/2014  [employer] Patient Education ©2017 [employer]

## (undated) DEVICE — Device

## (undated) DEVICE — CONTAINER,SPECIMEN,OR STERILE,4OZ: Brand: MEDLINE

## (undated) DEVICE — GLV SURG NEOLON 2G PF LF 7.5 STRL

## (undated) DEVICE — Device: Brand: DEFENDO AIR/WATER/SUCTION AND BIOPSY VALVE

## (undated) DEVICE — DECANTER: Brand: UNBRANDED

## (undated) DEVICE — DEV INFL ALLIANCE2 SYS

## (undated) DEVICE — 3M™ STERI-DRAPE™ INSTRUMENT POUCH 1018: Brand: STERI-DRAPE™

## (undated) DEVICE — INTENT TO BE USED WITH SUTURE MATERIAL FOR TISSUE CLOSURE: Brand: RICHARD-ALLAN® NEEDLE 1/2 CIRCLE TAPER

## (undated) DEVICE — SPNG GZ WOVN 4X4IN 12PLY 10/BX STRL

## (undated) DEVICE — SYR LL 3CC

## (undated) DEVICE — THE BITE BLOCK MAXI, LATEX FREE STRAP IS USED TO PROTECT THE ENDOSCOPE INSERTION TUBE FROM BEING BITTEN BY THE PATIENT.

## (undated) DEVICE — DRAPE,HIP,W/POUCHES,STERILE: Brand: MEDLINE

## (undated) DEVICE — GLV SURG SENSICARE ORTHO PF LF 7 STRL

## (undated) DEVICE — MEDI-VAC YANK SUCT HNDL W/TPRD BULBOUS TIP: Brand: CARDINAL HEALTH

## (undated) DEVICE — SYS SKIN CLS DERMABOND PRINEO W/22CM MESH TP

## (undated) DEVICE — DECANT BG O JET

## (undated) DEVICE — DRSNG SURESITE WNDW 4X4.5

## (undated) DEVICE — 1010 S-DRAPE TOWEL DRAPE 10/BX: Brand: STERI-DRAPE™

## (undated) DEVICE — MASK,FACE,SHIELD,BLUE,ANTI FOG,TIES: Brand: MEDLINE

## (undated) DEVICE — COAXIAL FEMORAL CANAL TIP

## (undated) DEVICE — SPNG LAP 18X18IN LF STRL PK/5

## (undated) DEVICE — DRSNG TELFA PAD NONADH STR 1S 3X8IN

## (undated) DEVICE — ESOPHAGEAL BALLOON DILATATION CATHETER: Brand: CRE FIXED WIRE

## (undated) DEVICE — JACKT LAB F/R KNIT CUFF/COLR XLG BLU

## (undated) DEVICE — BOWL AND CEMENT CARTRIDGE WITH BREAKAWAY FEMORAL NOZZLE: Brand: ACM

## (undated) DEVICE — GLV SURG SENSICARE MICRO PF LF 8 STRL

## (undated) DEVICE — SOL ISO/ALC RUB 70PCT 4OZ

## (undated) DEVICE — GLV SURG SENSICARE W/ALOE PF LF 7.5 STRL

## (undated) DEVICE — APPL CHLORAPREP W/TINT 26ML ORNG

## (undated) DEVICE — INTENDED FOR TISSUE SEPARATION, AND OTHER PROCEDURES THAT REQUIRE A SHARP SURGICAL BLADE TO PUNCTURE OR CUT.: Brand: BARD-PARKER ® STAINLESS STEEL BLADES

## (undated) DEVICE — SUCTION CANISTER, 3000CC,SAFELINER: Brand: DEROYAL

## (undated) DEVICE — SUT VIC 2/0 CT1 CR8 18IN J839D

## (undated) DEVICE — PK KN TOTL 90

## (undated) DEVICE — FEMORAL CANAL PRESSURIZER WITHOUT HUB, MEDIUM

## (undated) DEVICE — 3M™ IOBAN™ 2 ANTIMICROBIAL INCISE DRAPE 6651EZ: Brand: IOBAN™ 2

## (undated) DEVICE — GOWN ISOL W/THUMB UNIV BLU BX/15

## (undated) DEVICE — PILLW ABD SM

## (undated) DEVICE — 3M™ STERI-DRAPE™ U-DRAPE 1015: Brand: STERI-DRAPE™

## (undated) DEVICE — BW-412T DISP COMBO CLEANING BRUSH: Brand: SINGLE USE COMBINATION CLEANING BRUSH

## (undated) DEVICE — SUT VIC 0 CT1 CR8 18IN J840D

## (undated) DEVICE — HOOD, PEEL-AWAY: Brand: FLYTE

## (undated) DEVICE — DRAPE,U/ SHT,SPLIT,PLAS,STERIL: Brand: MEDLINE

## (undated) DEVICE — DUAL CUT SAGITTAL BLADE